# Patient Record
Sex: FEMALE | Race: WHITE | NOT HISPANIC OR LATINO | Employment: FULL TIME | URBAN - METROPOLITAN AREA
[De-identification: names, ages, dates, MRNs, and addresses within clinical notes are randomized per-mention and may not be internally consistent; named-entity substitution may affect disease eponyms.]

---

## 2017-01-17 ENCOUNTER — HOSPITAL ENCOUNTER (OUTPATIENT)
Dept: FAMILY MEDICINE CLINIC | Facility: CLINIC | Age: 42
Setting detail: SPECIMEN
Discharge: HOME | End: 2017-01-17
Attending: FAMILY MEDICINE | Admitting: FAMILY MEDICINE

## 2017-04-13 ENCOUNTER — HOSPITAL ENCOUNTER (OUTPATIENT)
Dept: FAMILY MEDICINE CLINIC | Facility: CLINIC | Age: 42
Setting detail: SPECIMEN
Discharge: HOME | End: 2017-04-13
Attending: FAMILY MEDICINE | Admitting: FAMILY MEDICINE

## 2017-04-13 LAB
ALBUMIN SERPL-MCNC: 3.7 G/DL (ref 3.5–4.8)
ALBUMIN/GLOB SERPL: 1.2 {RATIO} (ref 1–1.7)
ALP SERPL-CCNC: 81 IU/L (ref 32–91)
ALT SERPL-CCNC: 34 IU/L (ref 14–54)
ANION GAP SERPL CALC-SCNC: 13 MMOL/L (ref 10–20)
AST SERPL-CCNC: 25 IU/L (ref 15–41)
BILIRUB SERPL-MCNC: 0.4 MG/DL (ref 0.3–1.2)
BUN SERPL-MCNC: 7 MG/DL (ref 8–20)
BUN/CREAT SERPL: 8.8 (ref 5.4–26.2)
CALCIUM SERPL-MCNC: 9.6 MG/DL (ref 8.9–10.3)
CHLORIDE SERPL-SCNC: 105 MMOL/L (ref 101–111)
CHOLEST SERPL-MCNC: 120 MG/DL
CHOLEST/HDLC SERPL: 4 {RATIO}
CONV CO2: 27 MMOL/L (ref 22–32)
CONV LDL CHOLESTEROL DIRECT: 63 MG/DL (ref 0–100)
CONV TOTAL PROTEIN: 6.8 G/DL (ref 6.1–7.9)
CREAT UR-MCNC: 0.8 MG/DL (ref 0.4–1)
GLOBULIN UR ELPH-MCNC: 3.1 G/DL (ref 2.5–3.8)
GLUCOSE SERPL-MCNC: 176 MG/DL (ref 65–99)
HDLC SERPL-MCNC: 30 MG/DL
LDLC/HDLC SERPL: 2.1 {RATIO}
LIPID INTERPRETATION: ABNORMAL
POTASSIUM SERPL-SCNC: 4 MMOL/L (ref 3.6–5.1)
SODIUM SERPL-SCNC: 141 MMOL/L (ref 136–144)
TRIGL SERPL-MCNC: 220 MG/DL
VLDLC SERPL CALC-MCNC: 27 MG/DL

## 2017-10-12 ENCOUNTER — HOSPITAL ENCOUNTER (OUTPATIENT)
Dept: FAMILY MEDICINE CLINIC | Facility: CLINIC | Age: 42
Setting detail: SPECIMEN
Discharge: HOME | End: 2017-10-12
Attending: FAMILY MEDICINE | Admitting: FAMILY MEDICINE

## 2017-10-12 LAB
ALBUMIN SERPL-MCNC: 4.1 G/DL (ref 3.5–4.8)
ALBUMIN/GLOB SERPL: 1.3 {RATIO} (ref 1–1.7)
ALP SERPL-CCNC: 94 IU/L (ref 32–91)
ALT SERPL-CCNC: 42 IU/L (ref 14–54)
ANION GAP SERPL CALC-SCNC: 10.8 MMOL/L (ref 10–20)
AST SERPL-CCNC: 24 IU/L (ref 15–41)
BASOPHILS # BLD AUTO: 0.1 10*3/UL (ref 0–0.2)
BASOPHILS NFR BLD AUTO: 1 % (ref 0–2)
BILIRUB SERPL-MCNC: 0.3 MG/DL (ref 0.3–1.2)
BUN SERPL-MCNC: 13 MG/DL (ref 8–20)
BUN/CREAT SERPL: 16.3 (ref 5.4–26.2)
CALCIUM SERPL-MCNC: 9.4 MG/DL (ref 8.9–10.3)
CHLORIDE SERPL-SCNC: 101 MMOL/L (ref 101–111)
CHOLEST SERPL-MCNC: 150 MG/DL
CHOLEST/HDLC SERPL: 4.6 {RATIO}
CONV CO2: 27 MMOL/L (ref 22–32)
CONV LDL CHOLESTEROL DIRECT: 92 MG/DL (ref 0–100)
CONV TOTAL PROTEIN: 7.3 G/DL (ref 6.1–7.9)
CREAT UR-MCNC: 0.8 MG/DL (ref 0.4–1)
DIFFERENTIAL METHOD BLD: (no result)
EOSINOPHIL # BLD AUTO: 0.2 10*3/UL (ref 0–0.3)
EOSINOPHIL # BLD AUTO: 1 % (ref 0–3)
ERYTHROCYTE [DISTWIDTH] IN BLOOD BY AUTOMATED COUNT: 15.1 % (ref 11.5–14.5)
GLOBULIN UR ELPH-MCNC: 3.2 G/DL (ref 2.5–3.8)
GLUCOSE SERPL-MCNC: 137 MG/DL (ref 65–99)
HCT VFR BLD AUTO: 42.1 % (ref 35–49)
HDLC SERPL-MCNC: 33 MG/DL
HGB BLD-MCNC: 13.4 G/DL (ref 12–15)
LDLC/HDLC SERPL: 2.8 {RATIO}
LIPID INTERPRETATION: ABNORMAL
LYMPHOCYTES # BLD AUTO: 5.2 10*3/UL (ref 0.8–4.8)
LYMPHOCYTES NFR BLD AUTO: 34 % (ref 18–42)
MCH RBC QN AUTO: 26.6 PG (ref 26–32)
MCHC RBC AUTO-ENTMCNC: 31.9 G/DL (ref 32–36)
MCV RBC AUTO: 83.2 FL (ref 80–94)
MONOCYTES # BLD AUTO: 0.8 10*3/UL (ref 0.1–1.3)
MONOCYTES NFR BLD AUTO: 5 % (ref 2–11)
NEUTROPHILS # BLD AUTO: 9.2 10*3/UL (ref 2.3–8.6)
NEUTROPHILS NFR BLD AUTO: 59 % (ref 50–75)
NRBC BLD AUTO-RTO: 0 /100{WBCS}
NRBC/RBC NFR BLD MANUAL: 0 10*3/UL
PLATELET # BLD AUTO: 393 10*3/UL (ref 150–450)
PMV BLD AUTO: 7.6 FL (ref 7.4–10.4)
POTASSIUM SERPL-SCNC: 3.8 MMOL/L (ref 3.6–5.1)
RBC # BLD AUTO: 5.05 10*6/UL (ref 4–5.4)
SODIUM SERPL-SCNC: 135 MMOL/L (ref 136–144)
TRIGL SERPL-MCNC: 184 MG/DL
VLDLC SERPL CALC-MCNC: 25.7 MG/DL
WBC # BLD AUTO: 15.5 10*3/UL (ref 4.5–11.5)

## 2017-10-21 ENCOUNTER — HOSPITAL ENCOUNTER (OUTPATIENT)
Dept: MAMMOGRAPHY | Facility: HOSPITAL | Age: 42
Discharge: HOME | End: 2017-10-21
Attending: FAMILY MEDICINE | Admitting: FAMILY MEDICINE

## 2017-11-06 ENCOUNTER — HOSPITAL ENCOUNTER (OUTPATIENT)
Dept: MAMMOGRAPHY | Facility: HOSPITAL | Age: 42
Discharge: HOME | End: 2017-11-06
Attending: FAMILY MEDICINE | Admitting: FAMILY MEDICINE

## 2018-04-11 ENCOUNTER — HOSPITAL ENCOUNTER (OUTPATIENT)
Dept: FAMILY MEDICINE CLINIC | Facility: CLINIC | Age: 43
Setting detail: SPECIMEN
Discharge: HOME | End: 2018-04-11
Attending: FAMILY MEDICINE | Admitting: FAMILY MEDICINE

## 2018-04-11 LAB
ALBUMIN SERPL-MCNC: 3.9 G/DL (ref 3.5–4.8)
ALBUMIN/GLOB SERPL: 1.1 {RATIO} (ref 1–1.7)
ALP SERPL-CCNC: 81 IU/L (ref 32–91)
ALT SERPL-CCNC: 38 IU/L (ref 14–54)
ANION GAP SERPL CALC-SCNC: 11.2 MMOL/L (ref 10–20)
AST SERPL-CCNC: 22 IU/L (ref 15–41)
BASOPHILS # BLD AUTO: 0.1 10*3/UL (ref 0–0.2)
BASOPHILS NFR BLD AUTO: 1 % (ref 0–2)
BILIRUB SERPL-MCNC: 0.4 MG/DL (ref 0.3–1.2)
BUN SERPL-MCNC: 15 MG/DL (ref 8–20)
BUN/CREAT SERPL: 16.7 (ref 5.4–26.2)
CALCIUM SERPL-MCNC: 9.9 MG/DL (ref 8.9–10.3)
CHLORIDE SERPL-SCNC: 104 MMOL/L (ref 101–111)
CHOLEST SERPL-MCNC: 127 MG/DL
CHOLEST/HDLC SERPL: 4.3 {RATIO}
CONV CO2: 27 MMOL/L (ref 22–32)
CONV LDL CHOLESTEROL DIRECT: 73 MG/DL (ref 0–100)
CONV TOTAL PROTEIN: 7.3 G/DL (ref 6.1–7.9)
CREAT UR-MCNC: 0.9 MG/DL (ref 0.4–1)
DIFFERENTIAL METHOD BLD: (no result)
EOSINOPHIL # BLD AUTO: 0.2 10*3/UL (ref 0–0.3)
EOSINOPHIL # BLD AUTO: 1 % (ref 0–3)
ERYTHROCYTE [DISTWIDTH] IN BLOOD BY AUTOMATED COUNT: 15.7 % (ref 11.5–14.5)
GLOBULIN UR ELPH-MCNC: 3.4 G/DL (ref 2.5–3.8)
GLUCOSE SERPL-MCNC: 106 MG/DL (ref 65–99)
HCT VFR BLD AUTO: 39 % (ref 35–49)
HDLC SERPL-MCNC: 29 MG/DL
HGB BLD-MCNC: 12.8 G/DL (ref 12–15)
LDLC/HDLC SERPL: 2.5 {RATIO}
LIPID INTERPRETATION: ABNORMAL
LYMPHOCYTES # BLD AUTO: 4.4 10*3/UL (ref 0.8–4.8)
LYMPHOCYTES NFR BLD AUTO: 33 % (ref 18–42)
MCH RBC QN AUTO: 27.5 PG (ref 26–32)
MCHC RBC AUTO-ENTMCNC: 32.9 G/DL (ref 32–36)
MCV RBC AUTO: 83.5 FL (ref 80–94)
MONOCYTES # BLD AUTO: 0.6 10*3/UL (ref 0.1–1.3)
MONOCYTES NFR BLD AUTO: 5 % (ref 2–11)
NEUTROPHILS # BLD AUTO: 8.1 10*3/UL (ref 2.3–8.6)
NEUTROPHILS NFR BLD AUTO: 60 % (ref 50–75)
NRBC BLD AUTO-RTO: 0 /100{WBCS}
NRBC/RBC NFR BLD MANUAL: 0 10*3/UL
PLATELET # BLD AUTO: 365 10*3/UL (ref 150–450)
PMV BLD AUTO: 8 FL (ref 7.4–10.4)
POTASSIUM SERPL-SCNC: 4.2 MMOL/L (ref 3.6–5.1)
RBC # BLD AUTO: 4.66 10*6/UL (ref 4–5.4)
SODIUM SERPL-SCNC: 138 MMOL/L (ref 136–144)
TRIGL SERPL-MCNC: 219 MG/DL
VLDLC SERPL CALC-MCNC: 24.2 MG/DL
WBC # BLD AUTO: 13.4 10*3/UL (ref 4.5–11.5)

## 2018-11-12 ENCOUNTER — HOSPITAL ENCOUNTER (OUTPATIENT)
Dept: FAMILY MEDICINE CLINIC | Facility: CLINIC | Age: 43
Setting detail: SPECIMEN
Discharge: HOME | End: 2018-11-12
Attending: FAMILY MEDICINE | Admitting: FAMILY MEDICINE

## 2018-11-12 LAB
ALBUMIN SERPL-MCNC: 4.2 G/DL (ref 3.5–4.8)
ALBUMIN/GLOB SERPL: 1.3 {RATIO} (ref 1–1.7)
ALP SERPL-CCNC: 93 IU/L (ref 32–91)
ALT SERPL-CCNC: 55 IU/L (ref 14–54)
ANION GAP SERPL CALC-SCNC: 15.9 MMOL/L (ref 10–20)
AST SERPL-CCNC: 24 IU/L (ref 15–41)
BASOPHILS # BLD AUTO: 0.2 10*3/UL (ref 0–0.2)
BASOPHILS NFR BLD AUTO: 1 % (ref 0–2)
BILIRUB SERPL-MCNC: 0.3 MG/DL (ref 0.3–1.2)
BUN SERPL-MCNC: 8 MG/DL (ref 8–20)
BUN/CREAT SERPL: 11.4 (ref 5.4–26.2)
CALCIUM SERPL-MCNC: 9.9 MG/DL (ref 8.9–10.3)
CHLORIDE SERPL-SCNC: 96 MMOL/L (ref 101–111)
CHOLEST SERPL-MCNC: 149 MG/DL
CHOLEST/HDLC SERPL: 4.6 {RATIO}
CONV CO2: 28 MMOL/L (ref 22–32)
CONV LDL CHOLESTEROL DIRECT: 95 MG/DL (ref 0–100)
CONV TOTAL PROTEIN: 7.5 G/DL (ref 6.1–7.9)
CREAT UR-MCNC: 0.7 MG/DL (ref 0.4–1)
DIFFERENTIAL METHOD BLD: (no result)
EOSINOPHIL # BLD AUTO: 0.2 10*3/UL (ref 0–0.3)
EOSINOPHIL # BLD AUTO: 1 % (ref 0–3)
ERYTHROCYTE [DISTWIDTH] IN BLOOD BY AUTOMATED COUNT: 15.5 % (ref 11.5–14.5)
GLOBULIN UR ELPH-MCNC: 3.3 G/DL (ref 2.5–3.8)
GLUCOSE SERPL-MCNC: 123 MG/DL (ref 65–99)
HCT VFR BLD AUTO: 40.7 % (ref 35–49)
HDLC SERPL-MCNC: 32 MG/DL
HGB BLD-MCNC: 13.4 G/DL (ref 12–15)
LDLC/HDLC SERPL: 3 {RATIO}
LIPID INTERPRETATION: ABNORMAL
LYMPHOCYTES # BLD AUTO: 4.2 10*3/UL (ref 0.8–4.8)
LYMPHOCYTES NFR BLD AUTO: 31 % (ref 18–42)
MCH RBC QN AUTO: 27.3 PG (ref 26–32)
MCHC RBC AUTO-ENTMCNC: 32.9 G/DL (ref 32–36)
MCV RBC AUTO: 83.1 FL (ref 80–94)
MONOCYTES # BLD AUTO: 0.7 10*3/UL (ref 0.1–1.3)
MONOCYTES NFR BLD AUTO: 5 % (ref 2–11)
NEUTROPHILS # BLD AUTO: 8.5 10*3/UL (ref 2.3–8.6)
NEUTROPHILS NFR BLD AUTO: 62 % (ref 50–75)
NRBC BLD AUTO-RTO: 0 /100{WBCS}
NRBC/RBC NFR BLD MANUAL: 0 10*3/UL
PLATELET # BLD AUTO: 372 10*3/UL (ref 150–450)
PMV BLD AUTO: 8.3 FL (ref 7.4–10.4)
POTASSIUM SERPL-SCNC: 3.9 MMOL/L (ref 3.6–5.1)
RBC # BLD AUTO: 4.91 10*6/UL (ref 4–5.4)
SODIUM SERPL-SCNC: 136 MMOL/L (ref 136–144)
TRIGL SERPL-MCNC: 312 MG/DL
VLDLC SERPL CALC-MCNC: 21.4 MG/DL
WBC # BLD AUTO: 13.8 10*3/UL (ref 4.5–11.5)

## 2019-05-06 ENCOUNTER — HOSPITAL ENCOUNTER (OUTPATIENT)
Dept: FAMILY MEDICINE CLINIC | Facility: CLINIC | Age: 44
Setting detail: SPECIMEN
Discharge: HOME | End: 2019-05-06
Attending: FAMILY MEDICINE | Admitting: FAMILY MEDICINE

## 2019-05-23 ENCOUNTER — HOSPITAL ENCOUNTER (OUTPATIENT)
Dept: MAMMOGRAPHY | Facility: HOSPITAL | Age: 44
Discharge: HOME | End: 2019-05-23
Attending: FAMILY MEDICINE | Admitting: FAMILY MEDICINE

## 2019-07-02 RX ORDER — GLIPIZIDE 10 MG/1
TABLET, FILM COATED, EXTENDED RELEASE ORAL
Qty: 90 TABLET | Refills: 0 | Status: SHIPPED | OUTPATIENT
Start: 2019-07-02 | End: 2019-09-16 | Stop reason: ALTCHOICE

## 2019-08-31 RX ORDER — TIZANIDINE 2 MG/1
TABLET ORAL
Qty: 30 TABLET | Refills: 3 | Status: SHIPPED | OUTPATIENT
Start: 2019-08-31 | End: 2022-08-17

## 2019-09-16 ENCOUNTER — OFFICE VISIT (OUTPATIENT)
Dept: FAMILY MEDICINE CLINIC | Facility: CLINIC | Age: 44
End: 2019-09-16

## 2019-09-16 VITALS
SYSTOLIC BLOOD PRESSURE: 108 MMHG | OXYGEN SATURATION: 99 % | RESPIRATION RATE: 18 BRPM | BODY MASS INDEX: 37.42 KG/M2 | TEMPERATURE: 98.7 F | HEART RATE: 71 BPM | HEIGHT: 65 IN | DIASTOLIC BLOOD PRESSURE: 66 MMHG | WEIGHT: 224.6 LBS

## 2019-09-16 DIAGNOSIS — E11.9 TYPE 2 DIABETES MELLITUS WITHOUT COMPLICATION, WITHOUT LONG-TERM CURRENT USE OF INSULIN (HCC): Primary | ICD-10-CM

## 2019-09-16 DIAGNOSIS — E78.01 FAMILIAL HYPERCHOLESTEROLEMIA: ICD-10-CM

## 2019-09-16 DIAGNOSIS — E66.3 OVERWEIGHT: ICD-10-CM

## 2019-09-16 PROCEDURE — 99214 OFFICE O/P EST MOD 30 MIN: CPT | Performed by: FAMILY MEDICINE

## 2019-09-16 RX ORDER — ATORVASTATIN CALCIUM 40 MG/1
1 TABLET, FILM COATED ORAL NIGHTLY
COMMUNITY
Start: 2017-10-12 | End: 2019-09-16

## 2019-09-16 RX ORDER — METFORMIN HYDROCHLORIDE 500 MG/1
1000 TABLET, EXTENDED RELEASE ORAL 2 TIMES DAILY WITH MEALS
Qty: 360 TABLET | Refills: 3 | Status: SHIPPED | OUTPATIENT
Start: 2019-09-16 | End: 2020-06-10

## 2019-09-16 RX ORDER — SITAGLIPTIN 100 MG/1
1 TABLET, FILM COATED ORAL DAILY
Refills: 0 | COMMUNITY
Start: 2019-06-12 | End: 2019-09-16

## 2019-09-16 RX ORDER — BLOOD-GLUCOSE METER
1 EACH MISCELLANEOUS DAILY
COMMUNITY
Start: 2016-10-03

## 2019-09-16 RX ORDER — ATORVASTATIN CALCIUM 40 MG/1
40 TABLET, FILM COATED ORAL NIGHTLY
Qty: 90 TABLET | Refills: 3 | Status: SHIPPED | OUTPATIENT
Start: 2019-09-16 | End: 2020-09-04

## 2019-09-16 RX ORDER — METFORMIN HYDROCHLORIDE 500 MG/1
2 TABLET, EXTENDED RELEASE ORAL 2 TIMES DAILY WITH MEALS
COMMUNITY
Start: 2014-05-07 | End: 2019-09-16

## 2019-09-16 RX ORDER — ACARBOSE 100 MG/1
1 TABLET ORAL 3 TIMES DAILY
Refills: 1 | COMMUNITY
Start: 2019-07-23 | End: 2019-09-16 | Stop reason: ALTCHOICE

## 2019-09-16 RX ORDER — SITAGLIPTIN 100 MG/1
TABLET, FILM COATED ORAL
Qty: 90 TABLET | Refills: 0 | OUTPATIENT
Start: 2019-09-16

## 2019-09-16 RX ORDER — GLIPIZIDE 10 MG/1
TABLET, FILM COATED, EXTENDED RELEASE ORAL
Qty: 90 TABLET | Refills: 0 | OUTPATIENT
Start: 2019-09-16

## 2019-09-16 NOTE — PATIENT INSTRUCTIONS
Type 2 Diabetes Mellitus, Self Care, Adult  Caring for yourself after you have been diagnosed with type 2 diabetes (type 2 diabetes mellitus) means keeping your blood sugar (glucose) under control with a balance of:  · Nutrition.  · Exercise.  · Lifestyle changes.  · Medicines or insulin, if necessary.  · Support from your team of health care providers and others.  The following information explains what you need to know to manage your diabetes at home.  What are the risks?  Having diabetes can put you at risk for other long-term (chronic) conditions, such as heart disease and kidney disease. Your health care provider may prescribe medicines to help prevent complications from diabetes. These medicines may include:  · Aspirin.  · Medicine to lower cholesterol.  · Medicine to control blood pressure.  How to monitor blood glucose    · Check your blood glucose every day, as often as told by your health care provider.  · Have your A1c (hemoglobin A1c) level checked two or more times a year, or as often as told by your health care provider.  Your health care provider will set individualized treatment goals for you. Generally, the goal of treatment is to maintain the following blood glucose levels:  · Before meals (preprandial):  mg/dL (4.4-7.2 mmol/L).  · After meals (postprandial): below 180 mg/dL (10 mmol/L).  · A1c level: less than 7%.  How to manage hyperglycemia and hypoglycemia  Hyperglycemia symptoms  Hyperglycemia, also called high blood glucose, occurs when blood glucose is too high. Make sure you know the early signs of hyperglycemia, such as:  · Increased thirst.  · Hunger.  · Feeling very tired.  · Needing to urinate more often than usual.  · Blurry vision.  Hypoglycemia symptoms  Hypoglycemia, also called low blood glucose, occurs with a blood glucose level at or below 70 mg/dL (3.9 mmol/L). The risk for hypoglycemia increases during or after exercise, during sleep, during illness, and when skipping  meals or not eating for a long time (fasting).  It is important to know the symptoms of hypoglycemia and treat it right away. Always have a 15-gram rapid-acting carbohydrate snack with you to treat low blood glucose. Family members and close friends should also know the symptoms and should understand how to treat hypoglycemia, in case you are not able to treat yourself. Symptoms may include:  · Hunger.  · Anxiety.  · Sweating and feeling clammy.  · Confusion.  · Dizziness or feeling light-headed.  · Sleepiness.  · Nausea.  · Increased heart rate.  · Headache.  · Blurry vision.  · Irritability.  · A change in coordination.  · Tingling or numbness around the mouth, lips, or tongue.  · Restless sleep.  · Fainting.  · Seizure.  Treating hypoglycemia  If you are alert and able to swallow safely, follow the 15:15 rule:  · Take 15 grams of a rapid-acting carbohydrate. Rapid-acting options include:  ? 1 tube of glucose gel.  ? 3 glucose pills.  ? 6-8 pieces of hard candy.  ? 4 oz (120 mL) of fruit juice.  ? 4 oz (120 mL) of regular (not diet) soda.  · Check your blood glucose 15 minutes after you take the carbohydrate.  · If the repeat blood glucose level is still at or below 70 mg/dL (3.9 mmol/L), take 15 grams of a carbohydrate again.  · If your blood glucose level does not increase above 70 mg/dL (3.9 mmol/L) after 3 tries, seek emergency medical care.  · After your blood glucose level returns to normal, eat a meal or a snack within 1 hour.  Treating severe hypoglycemia  Severe hypoglycemia is when your blood glucose level is at or below 54 mg/dL (3 mmol/L). Severe hypoglycemia is an emergency. Do not wait to see if the symptoms will go away. Get medical help right away. Call your local emergency services (911 in the [place]  If you have severe hypoglycemia and you cannot eat or drink, you may need an injection of glucagon. A family member or close friend should learn how to check your blood glucose and how to give you a  glucagon injection. Ask your health care provider if you need to have an emergency glucagon injection kit available.  Severe hypoglycemia may need to be treated in a hospital. The treatment may include getting glucose through an IV. You may also need treatment for the cause of your hypoglycemia.  Follow these instructions at home:  Take diabetes medicines as told  · If your health care provider prescribed insulin or diabetes medicines, take them every day.  · Do not run out of insulin or other diabetes medicines that you take. Plan ahead so you always have these available.  · If you use insulin, adjust your dosage based on how physically active you are and what foods you eat. Your health care provider will tell you how to adjust your dosage.  Make healthy food choices    The things that you eat and drink affect your blood glucose and your insulin dosage. Making good choices helps to control your diabetes and prevent other health problems. A healthy meal plan includes eating lean proteins, complex carbohydrates, fresh fruits and vegetables, low-fat dairy products, and healthy fats.  Make an appointment to see a diet and nutrition specialist (registered dietitian) to help you create an eating plan that is right for you. Make sure that you:  · Follow instructions from your health care provider about eating or drinking restrictions.  · Drink enough fluid to keep your urine pale yellow.  · Keep a record of the carbohydrates that you eat. Do this by reading food labels and learning the standard serving sizes of foods.  · Follow your sick day plan whenever you cannot eat or drink as usual. Make this plan in advance with your health care provider.    Stay active  Exercise regularly, as told by your health care provider. This may include:  · Stretching and doing strength exercises, such as yoga or weightlifting, 2 or more times a week.  · Doing 150 minutes or more of moderate-intensity or vigorous-intensity exercise each  week. This could be brisk walking, biking, or water aerobics.  ? Spread out your activity over 3 or more days of the week.  ? Do not go more than 2 days in a row without doing some kind of physical activity.  When you start a new exercise or activity, work with your health care provider to adjust your insulin, medicines, or food intake as needed.  Make healthy lifestyle choices  · Do not use any tobacco products, such as cigarettes, chewing tobacco, and e-cigarettes. If you need help quitting, ask your health care provider.  · If your health care provider says that alcohol is safe for you, limit alcohol intake to no more than 1 drink per day for nonpregnant women and 2 drinks per day for men. One drink equals 12 oz of beer, 5 oz of wine, or 1½ oz of hard liquor.  · Learn to manage stress. If you need help with this, ask your health care provider.  Care for your body    · Keep your immunizations up to date. In addition to getting vaccinations as told by your health care provider, it is recommended that you get vaccinated against the following illnesses:  ? The flu (influenza). Get a flu shot every year.  ? Pneumonia.  ? Hepatitis B.  · Schedule an eye exam soon after your diagnosis, and then one time every year after that.  · Check your skin and feet every day for cuts, bruises, redness, blisters, or sores. Schedule a foot exam with your health care provider once every year.  · Brush your teeth and gums two times a day, and floss one or more times a day. Visit your dentist one or more times every 6 months.  · Maintain a healthy weight.  General instructions  · Take over-the-counter and prescription medicines only as told by your health care provider.  · Share your diabetes management plan with people in your workplace, school, and household.  · Carry a medical alert card or wear medical alert jewelry.  · Keep all follow-up visits as told by your health care provider. This is important.  Questions to ask your health  care provider  · Do I need to meet with a diabetes educator?  · Where can I find a support group for people with diabetes?  Where to find more information  For more information about diabetes, visit:  · American Diabetes Association (ADA): www.diabetes.org  · American Association of Diabetes Educators (AADE): www.diabeteseducator.org  Summary  · Caring for yourself after you have been diagnosed with (type 2 diabetes mellitus) means keeping your blood sugar (glucose) under control with a balance of nutrition, exercise, lifestyle changes, and medicine.  · Check your blood glucose every day, as often as told by your health care provider.  · Having diabetes can put you at risk for other long-term (chronic) conditions, such as heart disease and kidney disease. Your health care provider may prescribe medicines to help prevent complications from diabetes.  · Keep all follow-up visits as told by your health care provider. This is important.  This information is not intended to replace advice given to you by your health care provider. Make sure you discuss any questions you have with your health care provider.  Document Released: 04/10/2017 Document Revised: 07/20/2018 Document Reviewed: 01/20/2017  [employer] Interactive Patient Education © 2019 [employer]    Mindfulness-Based Stress Reduction  Mindfulness-based stress reduction (MBSR) is a program that helps people learn to practice mindfulness. Mindfulness is the practice of intentionally paying attention to the present moment. It can be learned and practiced through techniques such as education, breathing exercises, meditation, and yoga. MBSR includes several mindfulness techniques in one program.  MBSR works best when you understand the treatment, are willing to try new things, and can commit to spending time practicing what you learn. MBSR training may include learning about:  · How your emotions, thoughts, and reactions affect your body.  · New ways to respond to  things that cause negative thoughts to start (triggers).  · How to notice your thoughts and let go of them.  · Practicing awareness of everyday things that you normally do without thinking.  · The techniques and goals of different types of meditation.  What are the benefits of MBSR?  MBSR can have many benefits, which include helping you to:  · Develop self-awareness. This refers to knowing and understanding yourself.  · Learn skills and attitudes that help you to participate in your own health care.  · Learn new ways to care for yourself.  · Be more accepting about how things are, and let things go.  · Be less judgmental and approach things with an open mind.  · Be patient with yourself and trust yourself more.  MBSR has also been shown to:  · Reduce negative emotions, such as depression and anxiety.  · Improve memory and focus.  · Change how you sense and approach pain.  · Boost your body's ability to fight infections.  · Help you connect better with other people.  · Improve your sense of well-being.  Follow these instructions at home:    · Find a local in-person or online MBSR program.  · Set aside some time regularly for mindfulness practice.  · Find a mindfulness practice that works best for you. This may include one or more of the following:  ? Meditation. Meditation involves focusing your mind on a certain thought or activity.  ? Breathing awareness exercises. These help you to stay present by focusing on your breath.  ? Body scan. For this practice, you lie down and pay attention to each part of your body from head to toe. You can identify tension and soreness and intentionally relax parts of your body.  ? Yoga. Yoga involves stretching and breathing, and it can improve your ability to move and be flexible. It can also provide an experience of testing your body's limits, which can help you release stress.  ? Mindful eating. This way of eating involves focusing on the taste, texture, color, and smell of each  bite of food. Because this slows down eating and helps you feel full sooner, it can be an important part of a weight-loss plan.  · Find a podcast or recording that provides guidance for breathing awareness, body scan, or meditation exercises. You can listen to these any time when you have a free moment to rest without distractions.  · Follow your treatment plan as told by your health care provider. This may include taking regular medicines and making changes to your diet or lifestyle as recommended.  How to practice mindfulness  To do a basic awareness exercise:  · Find a comfortable place to sit.  · Pay attention to the present moment. Observe your thoughts, feelings, and surroundings just as they are.  · Avoid placing judgment on yourself, your feelings, or your surroundings. Make note of any judgment that comes up, and let it go.  · Your mind may wander, and that is okay. Make note of when your thoughts drift, and return your attention to the present moment.  To do basic mindfulness meditation:  · Find a comfortable place to sit. This may include a stable chair or a firm floor cushion.  ? Sit upright with your back straight. Let your arms fall next to your side with your hands resting on your legs.  ? If sitting in a chair, rest your feet flat on the floor.  ? If sitting on a cushion, cross your legs in front of you.  · Keep your head in a neutral position with your chin dropped slightly. Relax your jaw and rest the tip of your tongue on the roof of your mouth. Drop your gaze to the floor. You can close your eyes if you like.  · Breathe normally and pay attention to your breath. Feel the air moving in and out of your nose. Feel your belly expanding and relaxing with each breath.  · Your mind may wander, and that is okay. Make note of when your thoughts drift, and return your attention to your breath.  · Avoid placing judgment on yourself, your feelings, or your surroundings. Make note of any judgment or feelings  that come up, let them go, and bring your attention back to your breath.  · When you are ready, lift your gaze or open your eyes. Pay attention to how your body feels after the meditation.  Where to find more information  You can find more information about MBSR from:  · Your health care provider.  · Community-based meditation centers or programs.  · Programs offered near you.  Summary  · Mindfulness-based stress reduction (MBSR) is a program that teaches you how to intentionally pay attention to the present moment. It is used with other treatments to help you cope better with daily stress, emotions, and pain.  · MBSR focuses on developing self-awareness, which allows you to respond to life stress without judgment or negative emotions.  · MBSR programs may involve learning different mindfulness practices, such as breathing exercises, meditation, yoga, body scan, or mindful eating. Find a mindfulness practice that works best for you, and set aside time for it on a regular basis.  This information is not intended to replace advice given to you by your health care provider. Make sure you discuss any questions you have with your health care provider.  Document Released: 04/26/2018 Document Revised: 04/26/2018 Document Reviewed: 04/26/2018  [employer] Interactive Patient Education © 2019 [employer]

## 2019-09-16 NOTE — PROGRESS NOTES
Chief Complaint   Patient presents with   • Diabetes   • Hyperlipidemia   • Spasms       Subjective   History of Present Illness   Maria E Barroso is a 44 y.o. female.   She presents today to follow-up on her diabetes.  She claims home glucose readings fasting morning are around 130 she does report frequent low sugars in the mornings and has the numbers as well as 54 but become symptomatic with sugars dropping 79 she is experience in this most days.  She is typically not able to eat lunch until around noon.  She wakes up at 40 breakfast when she is to work on 5 or 6.  She was diagnosed with diabetes about 6 years ago initial A1c was 10 she states Precose was not very effective so Januvia was added most recently she is currently on for oral hypoglycemic most recent A1c in November was 6.6.  Her last eye exam was December 2018 at [facility] in [place]     Hyperlipidemia,:denies side effects of Lipitor    Obesity: Patient is interested in losing weight his had a hard time doing so, especially since her medications make her hungry and she is frequently eating to deal with low sugars        The following portions of the patient's history were reviewed and updated as appropriate: allergies, current medications,  family history, past medical history, social history,  surgical history and problem list.    Current Outpatient Medications on File Prior to Visit   Medication Sig   • Blood Glucose Monitoring Suppl (ACCU-CHEK [place] PLUS) w/Device kit 1 kit by Other route Daily.   • tiZANidine (ZANAFLEX) 2 MG tablet TAKE 1 TO 2 TABLETS BY MOUTH AT NIGHT AS NEEDED   • [DISCONTINUED] acarbose (PRECOSE) 100 MG tablet Take 1 tablet by mouth 3 (Three) Times a Day. Take with meals   • [DISCONTINUED] atorvastatin (LIPITOR) 40 MG tablet Take 1 tablet by mouth Every Night.   • [DISCONTINUED] glipiZIDE (GLUCOTROL XL) 10 MG 24 hr tablet TAKE 1 TABLET BY MOUTH EVERY DAY   • [DISCONTINUED] glucose blood (ACCU-CHEK [place] PLUS) test strip  "1 strip by In Vitro route Daily.   • [DISCONTINUED] JANUVIA 100 MG tablet Take 1 tablet by mouth Daily.   • [DISCONTINUED] Lancets (ACCU-CHEK SOFT TOUCH) lancets 1 each Daily.   • [DISCONTINUED] metFORMIN ER (GLUCOPHAGE-XR) 500 MG 24 hr tablet Take 2 tablets by mouth 2 (Two) Times a Day With Meals.     No current facility-administered medications on file prior to visit.          Review of Systems   Constitutional: Positive for fatigue. Negative for appetite change, fever and unexpected weight loss.   HENT: Negative for congestion.    Eyes: Negative for visual disturbance.   Respiratory: Negative for cough, shortness of breath and wheezing.    Cardiovascular: Negative for chest pain, palpitations and leg swelling.   Gastrointestinal: Negative for abdominal pain, constipation, diarrhea, nausea, vomiting and indigestion.   Skin: Negative for rash.   Neurological: Positive for light-headedness ( With low glucose) and headache ( With high glucose readings). Negative for numbness.   Psychiatric/Behavioral: Negative for sleep disturbance and depressed mood.       Objective   /66 (BP Location: Right arm, Patient Position: Sitting, Cuff Size: Large Adult)   Pulse 71   Temp 98.7 °F (37.1 °C) (Oral)   Resp 18   Ht 165.1 cm (65\")   Wt 102 kg (224 lb 9.6 oz)   SpO2 99%   BMI 37.38 kg/m²     Physical Exam   Constitutional: She is oriented to person, place, and time. She appears well-developed and well-nourished.   HENT:   Head: Normocephalic and atraumatic.   Eyes: Conjunctivae and EOM are normal. Pupils are equal, round, and reactive to light.   Neck: No JVD present. No thyromegaly present.   Cardiovascular: Normal rate, regular rhythm and normal heart sounds.   No murmur heard.  Pulmonary/Chest: Breath sounds normal. She has no wheezes. She has no rales.   Musculoskeletal: She exhibits no edema.   Lymphadenopathy:     She has no cervical adenopathy.   Neurological: She is alert and oriented to person, place, and " time.   Skin: Skin is warm and dry. No rash noted.   Psychiatric: She has a normal mood and affect.   Nursing note and vitals reviewed.      No visits with results within 4 Month(s) from this visit.   Latest known visit with results is:   Hospital Outpatient Visit on 11/12/2018   Component Date Value Ref Range Status   • WBC 11/12/2018 13.8* 4.5 - 11.5 10*3/uL Final   • RBC 11/12/2018 4.91  4.00 - 5.40 10*6/uL Final   • Hemoglobin 11/12/2018 13.4  12.0 - 15.0 g/dL Final   • Hematocrit 11/12/2018 40.7  35 - 49 % Final   • MCV 11/12/2018 83.1  80 - 94 fL Final   • MCH 11/12/2018 27.3  26 - 32 pg Final   • MCHC 11/12/2018 32.9  32 - 36 g/dL Final   • RDW 11/12/2018 15.5* 11.5 - 14.5 % Final   • Platelets 11/12/2018 372  150 - 450 10*3/uL Final   • MPV 11/12/2018 8.3  7.4 - 10.4 fL Final   • Differential Type 11/12/2018 AUTO   Final   • Neutrophils Absolute 11/12/2018 8.5  2.3 - 8.6 10*3/uL Final   • Lymphocytes Absolute 11/12/2018 4.2  0.8 - 4.8 10*3/uL Final   • Monocytes Absolute 11/12/2018 0.7  0.1 - 1.3 10*3/uL Final   • Eosinophils Absolute 11/12/2018 0.2  0.0 - 0.3 10*3/uL Final   • Basophils Absolute 11/12/2018 0.2  0 - 0.2 10*3/uL Final   • Neutrophil Rel % 11/12/2018 62  50 - 75 % Final   • Lymphocyte Rel % 11/12/2018 31  18 - 42 % Final   • Monocyte Rel % 11/12/2018 5  2 - 11 % Final   • Eosinophil Rel % 11/12/2018 1  0 - 3 % Final   • Basophil Rel % 11/12/2018 1  0 - 2 % Final   • nRBC 11/12/2018 0  0 /100[WBCs] Final   • Absolute nRBC 11/12/2018 0  10*3/uL Final   • Total Cholesterol 11/12/2018 149  <200 mg/dL Final   • Triglycerides 11/12/2018 312* <150 mg/dL Final   • HDL Cholesterol 11/12/2018 32* >39 mg/dL Final   • LDL Cholesterol  11/12/2018 95  0 - 100 mg/dL Final   • VLDL Cholesterol 11/12/2018 21.4* <21 mg/dL Final   • LDL/HDL Ratio 11/12/2018 3.0   Final   • Chol/HDL Ratio 11/12/2018 4.6   Final    (SEE BELOW)  The following guidelines have been recommended by the NCEP for -    • Lipid  Interpretation 11/12/2018 Total Cholesterol, Total Triglycerides, LDL Cholesterol,and HDL Cholesterol:    Final   • Lipid Interpretation 11/12/2018 TOTAL CHOLESTEROL                    HDL CHOLESTEROL  Desirable:              Final   • Lipid Interpretation 11/12/2018 <200 mg/dL     Low HDL:            <40 mg/dL  Borderline High:   200-239 mg/dL    Final   • Lipid Interpretation 11/12/2018    Normal:           40-60 mg/dL  High:           > or = 240 mg/dL       Final   • Lipid Interpretation 11/12/2018 Desirable:          >60 mg/dL  TOTAL TRIGLYCERIDE                   LDL   Final   • Lipid Interpretation 11/12/2018 CHOLESTEROL  Normal:               <150 mg/dL     Optimal:           <100 mg/dL   Final   • Lipid Interpretation 11/12/2018  Borderline High:   150-199 mg/dL     Low Risk:       100-129 mg/dL  High:        Final   • Lipid Interpretation 11/12/2018         200-499 mg/dL     Borderline High:130-159 mg/dL  Very High:      > or =   Final   • Lipid Interpretation 11/12/2018 500 mg/dL     High:           160-189 mg/dL                                       Final   • Lipid Interpretation 11/12/2018   Very High:   > or = 190 mg/dL  The following ratios of LDL to HDL and Total   Final   • Lipid Interpretation 11/12/2018 Cholesterol to HDL are for information only:  LDL/HDL RATIO                       Final   • Lipid Interpretation 11/12/2018    TOTAL CHOLESTEROL/HDL RATIO  Desirable:              <5           Low Risk:    Final   • Lipid Interpretation 11/12/2018      3.3-4.4   Optimal:        < or = 3.5           Average Risk:   4.4-7.1       Final   • Lipid Interpretation 11/12/2018                                    Medium Risk:    7.1-11                         Final   • Lipid Interpretation 11/12/2018                   High Risk:         >11      Final   • Sodium 11/12/2018 136  136 - 144 mmol/L Final   • Potassium 11/12/2018 3.9  3.6 - 5.1 mmol/L Final   • Chloride 11/12/2018 96* 101 - 111 mmol/L Final   •  CO2 11/12/2018 28  22 - 32 mmol/L Final   • Glucose 11/12/2018 123* 65 - 99 mg/dL Final   • BUN 11/12/2018 8  8 - 20 mg/dL Final   • Creatinine 11/12/2018 0.7  0.4 - 1.0 mg/dl Final   • Calcium 11/12/2018 9.9  8.9 - 10.3 mg/dL Final   • Total Protein 11/12/2018 7.5  6.1 - 7.9 g/dL Final   • Albumin 11/12/2018 4.2  3.5 - 4.8 g/dL Final   • Total Bilirubin 11/12/2018 0.3  0.3 - 1.2 mg/dL Final   • Alkaline Phosphatase 11/12/2018 93* 32 - 91 IU/L Final   • AST (SGOT) 11/12/2018 24  15 - 41 IU/L Final   • ALT (SGPT) 11/12/2018 55* 14 - 54 IU/L Final   • Anion Gap 11/12/2018 15.9  10 - 20 Final   • BUN/Creatinine Ratio 11/12/2018 11.4  5.4 - 26.2 Final   • GFR MDRD Non  11/12/2018 >60  >60 mL/min/1.73m2 Final   • GFR MDRD  11/12/2018 >60  >60 mL/min/1.73m2 Final   • Globulin 11/12/2018 3.3  2.5 - 3.8 G/dL Final   • A/G Ratio 11/12/2018 1.3  1.0 - 1.7 Final         Assessment/Plan   Diagnoses and all orders for this visit:    1. Type 2 diabetes mellitus without complication, without long-term current use of insulin (CMS/[facility]) (Primary)  -     Dulaglutide (TRULICITY) 0.75 MG/0.5ML solution pen-injector; Inject 0.75 mg under the skin into the appropriate area as directed 1 (One) Time Per Week.  Dispense: 4 pen; Refill: 0  -     atorvastatin (LIPITOR) 40 MG tablet; Take 1 tablet by mouth Every Night.  Dispense: 90 tablet; Refill: 3  -     metFORMIN ER (GLUCOPHAGE-XR) 500 MG 24 hr tablet; Take 2 tablets by mouth 2 (Two) Times a Day With Meals.  Dispense: 360 tablet; Refill: 3  -     Lancets (ACCU-CHEK SOFT TOUCH) lancets; 1 each by Other route Daily.  Dispense: 300 each; Refill: 3  -     glucose blood (ACCU-CHEK [place] PLUS) test strip; 1 each by Other route Daily.  Dispense: 300 each; Refill: 5  -     Comprehensive metabolic panel; Future  -     Lipid Panel With LDL/HDL Ratio; Future  -     Hemoglobin A1c; Future    2. Familial hypercholesterolemia  -     atorvastatin (LIPITOR) 40 MG tablet;  Take 1 tablet by mouth Every Night.  Dispense: 90 tablet; Refill: 3  -     Lipid Panel With LDL/HDL Ratio; Future    3. Overweight      Concentrated today on trying to simplify and update patient's diabetic regimen especially since she is having frequent hypoglycemia.  We will continue metformin.  Januvia, glipizide, and Precose starting Trulicity.  We will have her return in 1 month checking glucose readings fasting in the mornings and anytime she has sensation with her elevated or low.  Likely increase Trulicity to 1.5 mg at that time.  Additionally will likely start ACE inhibitor for renal protection.  Do encourage continued low concentrated sweet diet and attempt at weight loss         Return in about 4 weeks (around 10/14/2019).      AVS given with handouts appropriate to diagnoses addressed

## 2019-09-17 ENCOUNTER — RESULTS ENCOUNTER (OUTPATIENT)
Dept: FAMILY MEDICINE CLINIC | Facility: CLINIC | Age: 44
End: 2019-09-17

## 2019-09-17 DIAGNOSIS — E11.9 TYPE 2 DIABETES MELLITUS WITHOUT COMPLICATION, WITHOUT LONG-TERM CURRENT USE OF INSULIN (HCC): ICD-10-CM

## 2019-09-17 DIAGNOSIS — E78.01 FAMILIAL HYPERCHOLESTEROLEMIA: ICD-10-CM

## 2019-09-18 LAB
ALBUMIN SERPL-MCNC: 4.6 G/DL (ref 3.5–5.5)
ALBUMIN/GLOB SERPL: 1.6 {RATIO} (ref 1.2–2.2)
ALP SERPL-CCNC: 99 IU/L (ref 39–117)
ALT SERPL-CCNC: 37 IU/L (ref 0–32)
AST SERPL-CCNC: 25 IU/L (ref 0–40)
BILIRUB SERPL-MCNC: 0.3 MG/DL (ref 0–1.2)
BUN SERPL-MCNC: 11 MG/DL (ref 6–24)
BUN/CREAT SERPL: 15 (ref 9–23)
CALCIUM SERPL-MCNC: 9.8 MG/DL (ref 8.7–10.2)
CHLORIDE SERPL-SCNC: 99 MMOL/L (ref 96–106)
CHOLEST SERPL-MCNC: 142 MG/DL (ref 100–199)
CO2 SERPL-SCNC: 21 MMOL/L (ref 20–29)
CREAT SERPL-MCNC: 0.73 MG/DL (ref 0.57–1)
GLOBULIN SER CALC-MCNC: 2.8 G/DL (ref 1.5–4.5)
GLUCOSE SERPL-MCNC: 75 MG/DL (ref 65–99)
HBA1C MFR BLD: 7.7 % (ref 4.8–5.6)
HDLC SERPL-MCNC: 32 MG/DL
LDLC SERPL CALC-MCNC: 78 MG/DL (ref 0–99)
LDLC/HDLC SERPL: 2.4 RATIO (ref 0–3.2)
POTASSIUM SERPL-SCNC: 4.4 MMOL/L (ref 3.5–5.2)
PROT SERPL-MCNC: 7.4 G/DL (ref 6–8.5)
SODIUM SERPL-SCNC: 140 MMOL/L (ref 134–144)
TRIGL SERPL-MCNC: 162 MG/DL (ref 0–149)
VLDLC SERPL CALC-MCNC: 32 MG/DL (ref 5–40)

## 2019-09-19 ENCOUNTER — TELEPHONE (OUTPATIENT)
Dept: FAMILY MEDICINE CLINIC | Facility: CLINIC | Age: 44
End: 2019-09-19

## 2019-09-19 NOTE — TELEPHONE ENCOUNTER
PATIENT CALLED IN THIS AM AND STATED HER SUGARS ARE STILL RUNNING HIGH - SHE TOOK THE TRULICITY  INJ YESTERDAY WENT TO WORK AND HAD TO GO HOME DUE TO IT MADE HER SICK - VERY NAUSEATED - STATING THIS IS NOT NORMAL - PLEASE CONTACT PATIENT

## 2019-09-19 NOTE — TELEPHONE ENCOUNTER
Called patient on cell phone. No answer. Per HIPAA may leave VM on cell phone. VM left on cell phone advising her of the above and to call office with questions/concerns.

## 2019-09-19 NOTE — TELEPHONE ENCOUNTER
Please call patient and let her know nausea is a common side effect with Trulicity but tends to get better after a couple weeks.  Ask how high her sugars are?  If 180 or 200 higher she could take the glipizide 10 mg daily until Trulicity starts working better and glucose is below 160

## 2019-10-14 ENCOUNTER — OFFICE VISIT (OUTPATIENT)
Dept: FAMILY MEDICINE CLINIC | Facility: CLINIC | Age: 44
End: 2019-10-14

## 2019-10-14 VITALS
HEIGHT: 65 IN | RESPIRATION RATE: 16 BRPM | SYSTOLIC BLOOD PRESSURE: 101 MMHG | BODY MASS INDEX: 36.49 KG/M2 | HEART RATE: 78 BPM | DIASTOLIC BLOOD PRESSURE: 71 MMHG | TEMPERATURE: 97.7 F | OXYGEN SATURATION: 99 % | WEIGHT: 219 LBS

## 2019-10-14 DIAGNOSIS — E11.9 TYPE 2 DIABETES MELLITUS WITHOUT COMPLICATION, WITHOUT LONG-TERM CURRENT USE OF INSULIN (HCC): Primary | ICD-10-CM

## 2019-10-14 DIAGNOSIS — Z23 NEED FOR PROPHYLACTIC VACCINATION AND INOCULATION AGAINST INFLUENZA: ICD-10-CM

## 2019-10-14 DIAGNOSIS — E66.9 OBESITY (BMI 30-39.9): ICD-10-CM

## 2019-10-14 PROCEDURE — 99214 OFFICE O/P EST MOD 30 MIN: CPT | Performed by: FAMILY MEDICINE

## 2019-10-14 PROCEDURE — 90471 IMMUNIZATION ADMIN: CPT | Performed by: FAMILY MEDICINE

## 2019-10-14 PROCEDURE — 90674 CCIIV4 VAC NO PRSV 0.5 ML IM: CPT | Performed by: FAMILY MEDICINE

## 2019-10-14 NOTE — PATIENT INSTRUCTIONS
Diabetes Mellitus and Exercise  Exercising regularly is important for your overall health, especially when you have diabetes (diabetes mellitus). Exercising is not only about losing weight. It has many other health benefits, such as increasing muscle strength and bone density and reducing body fat and stress. This leads to improved fitness, flexibility, and endurance, all of which result in better overall health.  Exercise has additional benefits for people with diabetes, including:  · Reducing appetite.  · Helping to lower and control blood glucose.  · Lowering blood pressure.  · Helping to control amounts of fatty substances (lipids) in the blood, such as cholesterol and triglycerides.  · Helping the body to respond better to insulin (improving insulin sensitivity).  · Reducing how much insulin the body needs.  · Decreasing the risk for heart disease by:  ? Lowering cholesterol and triglyceride levels.  ? Increasing the levels of good cholesterol.  ? Lowering blood glucose levels.  What is my activity plan?  Your health care provider or certified diabetes educator can help you make a plan for the type and frequency of exercise (activity plan) that works for you. Make sure that you:  · Do at least 150 minutes of moderate-intensity or vigorous-intensity exercise each week. This could be brisk walking, biking, or water aerobics.  ? Do stretching and strength exercises, such as yoga or weightlifting, at least 2 times a week.  ? Spread out your activity over at least 3 days of the week.  · Get some form of physical activity every day.  ? Do not go more than 2 days in a row without some kind of physical activity.  ? Avoid being inactive for more than 30 minutes at a time. Take frequent breaks to walk or stretch.  · Choose a type of exercise or activity that you enjoy, and set realistic goals.  · Start slowly, and gradually increase the intensity of your exercise over time.  What do I need to know about managing my  diabetes?    · Check your blood glucose before and after exercising.  ? If your blood glucose is 240 mg/dL (13.3 mmol/L) or higher before you exercise, check your urine for ketones. If you have ketones in your urine, do not exercise until your blood glucose returns to normal.  ? If your blood glucose is 100 mg/dL (5.6 mmol/L) or lower, eat a snack containing 15-20 grams of carbohydrate. Check your blood glucose 15 minutes after the snack to make sure that your level is above 100 mg/dL (5.6 mmol/L) before you start your exercise.  · Know the symptoms of low blood glucose (hypoglycemia) and how to treat it. Your risk for hypoglycemia increases during and after exercise. Common symptoms of hypoglycemia can include:  ? Hunger.  ? Anxiety.  ? Sweating and feeling clammy.  ? Confusion.  ? Dizziness or feeling light-headed.  ? Increased heart rate or palpitations.  ? Blurry vision.  ? Tingling or numbness around the mouth, lips, or tongue.  ? Tremors or shakes.  ? Irritability.  · Keep a rapid-acting carbohydrate snack available before, during, and after exercise to help prevent or treat hypoglycemia.  · Avoid injecting insulin into areas of the body that are going to be exercised. For example, avoid injecting insulin into:  ? The arms, when playing tennis.  ? The legs, when jogging.  · Keep records of your exercise habits. Doing this can help you and your health care provider adjust your diabetes management plan as needed. Write down:  ? Food that you eat before and after you exercise.  ? Blood glucose levels before and after you exercise.  ? The type and amount of exercise you have done.  ? When your insulin is expected to peak, if you use insulin. Avoid exercising at times when your insulin is peaking.  · When you start a new exercise or activity, work with your health care provider to make sure the activity is safe for you, and to adjust your insulin, medicines, or food intake as needed.  · Drink plenty of water while  you exercise to prevent dehydration or heat stroke. Drink enough fluid to keep your urine clear or pale yellow.  Summary  · Exercising regularly is important for your overall health, especially when you have diabetes (diabetes mellitus).  · Exercising has many health benefits, such as increasing muscle strength and bone density and reducing body fat and stress.  · Your health care provider or certified diabetes educator can help you make a plan for the type and frequency of exercise (activity plan) that works for you.  · When you start a new exercise or activity, work with your health care provider to make sure the activity is safe for you, and to adjust your insulin, medicines, or food intake as needed.  This information is not intended to replace advice given to you by your health care provider. Make sure you discuss any questions you have with your health care provider.  Document Released: 03/09/2005 Document Revised: 06/28/2018 Document Reviewed: 05/29/2017  [employer] Patient Education © 2019 [employer]

## 2019-10-14 NOTE — PROGRESS NOTES
Chief Complaint   Patient presents with   • Diabetes     Lowest blood sugar reading was 71; highest = 141   • Obesity     Lost 5# in 4 weeks now on Trulicity        Subjective   History of Present Illness   Maria E Barroso is a 44 y.o. female.   She returns today for a one-month follow-up since starting Trulicity.  She states she is feeling significantly better, no longer feeling weak and tired, having increased energy no longer having low sugars and not having to eat to deal with low sugars.  Previously was having sugars in the 50s currently  she initially stopped glipizide but had to restart at 10 mg in the morning due to some ongoing sugars but ran out 2 days ago.  She has lost 4 pounds without trying.  Initially she did have a little nausea and significant diarrhea but it has resolved.      The following portions of the patient's history were reviewed and updated as appropriate: allergies, current medications,  family history, past medical history, social history,  surgical history and problem list.    Current Outpatient Medications on File Prior to Visit   Medication Sig   • atorvastatin (LIPITOR) 40 MG tablet Take 1 tablet by mouth Every Night.   • Blood Glucose Monitoring Suppl (ACCU-CHEK [place] PLUS) w/Device kit 1 kit by Other route Daily.   • glucose blood (ACCU-CHEK [place] PLUS) test strip 1 each by Other route Daily.   • Lancets (ACCU-CHEK SOFT TOUCH) lancets 1 each by Other route Daily.   • metFORMIN ER (GLUCOPHAGE-XR) 500 MG 24 hr tablet Take 2 tablets by mouth 2 (Two) Times a Day With Meals.   • tiZANidine (ZANAFLEX) 2 MG tablet TAKE 1 TO 2 TABLETS BY MOUTH AT NIGHT AS NEEDED   • [DISCONTINUED] Dulaglutide (TRULICITY) 0.75 MG/0.5ML solution pen-injector Inject 0.75 mg under the skin into the appropriate area as directed 1 (One) Time Per Week.     No current facility-administered medications on file prior to visit.          Review of Systems   Constitutional: Positive for unexpected weight loss.  "Negative for appetite change and fever.   HENT: Negative for congestion.    Eyes: Negative for visual disturbance.   Respiratory: Negative for cough, shortness of breath and wheezing.    Cardiovascular: Negative for chest pain, palpitations and leg swelling.   Gastrointestinal: Negative for abdominal pain, constipation, diarrhea, nausea, vomiting and indigestion.   Skin: Negative for rash.   Neurological: Negative for light-headedness, numbness and headache.   Psychiatric/Behavioral: Negative for sleep disturbance and depressed mood.       Objective   /71 (BP Location: Left arm, Patient Position: Sitting)   Pulse 78   Temp 97.7 °F (36.5 °C) (Oral)   Resp 16   Ht 165.1 cm (65\")   Wt 99.3 kg (219 lb)   SpO2 99%   BMI 36.44 kg/m²     Physical Exam   Constitutional: She is oriented to person, place, and time. She appears well-developed and well-nourished.   HENT:   Head: Normocephalic and atraumatic.   Eyes: Conjunctivae and EOM are normal. Pupils are equal, round, and reactive to light.   Neck: Normal range of motion. No thyromegaly present.   Cardiovascular: Normal rate, regular rhythm and normal heart sounds.   No murmur heard.  Pulmonary/Chest: Breath sounds normal. She has no wheezes. She has no rales.   Musculoskeletal: Normal range of motion. She exhibits no edema.   Lymphadenopathy:     She has no cervical adenopathy.   Neurological: She is alert and oriented to person, place, and time.   Skin: Skin is warm and dry. No rash noted.   Psychiatric: She has a normal mood and affect.   Nursing note and vitals reviewed.      Results Encounter on 09/17/2019   Component Date Value Ref Range Status   • Glucose 09/17/2019 75  65 - 99 mg/dL Final   • BUN 09/17/2019 11  6 - 24 mg/dL Final   • Creatinine 09/17/2019 0.73  0.57 - 1.00 mg/dL Final   • eGFR Non African Am 09/17/2019 99  >59 mL/min/1.73 Final   • eGFR African Am 09/17/2019 114  >59 mL/min/1.73 Final   • BUN/Creatinine Ratio 09/17/2019 15  9 - 23 " Final   • Sodium 09/17/2019 140  134 - 144 mmol/L Final   • Potassium 09/17/2019 4.4  3.5 - 5.2 mmol/L Final   • Chloride 09/17/2019 99  96 - 106 mmol/L Final   • Total CO2 09/17/2019 21  20 - 29 mmol/L Final   • Calcium 09/17/2019 9.8  8.7 - 10.2 mg/dL Final   • Total Protein 09/17/2019 7.4  6.0 - 8.5 g/dL Final   • Albumin 09/17/2019 4.6  3.5 - 5.5 g/dL Final   • Globulin 09/17/2019 2.8  1.5 - 4.5 g/dL Final   • A/G Ratio 09/17/2019 1.6  1.2 - 2.2 Final   • Total Bilirubin 09/17/2019 0.3  0.0 - 1.2 mg/dL Final   • Alkaline Phosphatase 09/17/2019 99  39 - 117 IU/L Final   • AST (SGOT) 09/17/2019 25  0 - 40 IU/L Final   • ALT (SGPT) 09/17/2019 37* 0 - 32 IU/L Final   • Total Cholesterol 09/17/2019 142  100 - 199 mg/dL Final   • Triglycerides 09/17/2019 162* 0 - 149 mg/dL Final   • HDL Cholesterol 09/17/2019 32* >39 mg/dL Final   • VLDL Cholesterol 09/17/2019 32  5 - 40 mg/dL Final   • LDL Cholesterol  09/17/2019 78  0 - 99 mg/dL Final   • LDL/HDL Ratio 09/17/2019 2.4  0.0 - 3.2 ratio Final    Comment:                                     LDL/HDL Ratio                                              Men  Women                                1/2 Avg.Risk  1.0    1.5                                    Avg.Risk  3.6    3.2                                 2X Avg.Risk  6.2    5.0                                 3X Avg.Risk  8.0    6.1     • Hemoglobin A1C 09/17/2019 7.7* 4.8 - 5.6 % Final    Comment:          Prediabetes: 5.7 - 6.4           Diabetes: >6.4           Glycemic control for adults with diabetes: <7.0           Assessment/Plan   Diagnoses and all orders for this visit:    1. Type 2 diabetes mellitus without complication, without long-term current use of insulin (CMS/[facility]) (Primary)  -     Dulaglutide (TRULICITY) 1.5 MG/0.5ML solution pen-injector; Inject 1.5 mg under the skin into the appropriate area as directed 1 (One) Time Per Week.  Dispense: 12 pen; Refill: 3  -     Hemoglobin A1c; Future  -     Comprehensive  Metabolic Panel; Future    2. Obesity (BMI 30-39.9)  -     Dulaglutide (TRULICITY) 1.5 MG/0.5ML solution pen-injector; Inject 1.5 mg under the skin into the appropriate area as directed 1 (One) Time Per Week.  Dispense: 12 pen; Refill: 3    3. Need for prophylactic vaccination and inoculation against influenza  -     Flucelvax Quad=>4Years ([id number])      Increase Trulicity to 1.5 mg and discontinue glipizide         Return in about 2 months (around 12/14/2019) for Recheck, with Labs.      AVS given with handouts appropriate to diagnoses addressed

## 2019-10-22 RX ORDER — ACARBOSE 100 MG/1
TABLET ORAL
Qty: 270 TABLET | Refills: 1 | OUTPATIENT
Start: 2019-10-22

## 2019-12-10 ENCOUNTER — OFFICE VISIT (OUTPATIENT)
Dept: FAMILY MEDICINE CLINIC | Facility: CLINIC | Age: 44
End: 2019-12-10

## 2019-12-10 VITALS
DIASTOLIC BLOOD PRESSURE: 65 MMHG | WEIGHT: 212 LBS | BODY MASS INDEX: 35.32 KG/M2 | HEART RATE: 77 BPM | TEMPERATURE: 98.5 F | HEIGHT: 65 IN | SYSTOLIC BLOOD PRESSURE: 121 MMHG | OXYGEN SATURATION: 97 %

## 2019-12-10 DIAGNOSIS — E11.9 TYPE 2 DIABETES MELLITUS WITHOUT COMPLICATION, WITHOUT LONG-TERM CURRENT USE OF INSULIN (HCC): Primary | ICD-10-CM

## 2019-12-10 DIAGNOSIS — E66.9 OBESITY (BMI 30-39.9): ICD-10-CM

## 2019-12-10 LAB — GLUCOSE BLDC GLUCOMTR-MCNC: 99 MG/DL (ref 70–130)

## 2019-12-10 PROCEDURE — 82962 GLUCOSE BLOOD TEST: CPT | Performed by: FAMILY MEDICINE

## 2019-12-10 PROCEDURE — 99213 OFFICE O/P EST LOW 20 MIN: CPT | Performed by: FAMILY MEDICINE

## 2019-12-10 NOTE — PROGRESS NOTES
Chief Complaint   Patient presents with   • Diabetes       Subjective   Diabetes   Associated symptoms include weight loss. Pertinent negatives for diabetes include no chest pain.      Maria E Barroso is a 44 y.o. female.   She returns today for  follow-up since starting Trulicity 2 months ago.  She states she is feeling significantly better, no longer feeling weak and tired, having increased energy no longer having low sugars and not having to eat to deal with low sugars. Typically glucose 80 to 150 no longer on gymeperide She has lost 16 pounds without trying.  Denies continued nausea or diarrhea. Does reduce apptitite significantly    The following portions of the patient's history were reviewed and updated as appropriate: allergies, current medications,  family history, past medical history, social history,  surgical history and problem list.    Current Outpatient Medications on File Prior to Visit   Medication Sig   • atorvastatin (LIPITOR) 40 MG tablet Take 1 tablet by mouth Every Night.   • Blood Glucose Monitoring Suppl (ACCU-CHEK [place] PLUS) w/Device kit 1 kit by Other route Daily.   • Dulaglutide (TRULICITY) 1.5 MG/0.5ML solution pen-injector Inject 1.5 mg under the skin into the appropriate area as directed 1 (One) Time Per Week.   • glucose blood (ACCU-CHEK [place] PLUS) test strip 1 each by Other route Daily.   • Lancets (ACCU-CHEK SOFT TOUCH) lancets 1 each by Other route Daily.   • metFORMIN ER (GLUCOPHAGE-XR) 500 MG 24 hr tablet Take 2 tablets by mouth 2 (Two) Times a Day With Meals.   • tiZANidine (ZANAFLEX) 2 MG tablet TAKE 1 TO 2 TABLETS BY MOUTH AT NIGHT AS NEEDED     No current facility-administered medications on file prior to visit.          Review of Systems   Constitutional: Positive for unexpected weight loss. Negative for appetite change and fever.   HENT: Negative for congestion.    Eyes: Negative for visual disturbance.   Respiratory: Negative for cough, shortness of breath and wheezing.   "  Cardiovascular: Negative for chest pain, palpitations and leg swelling.   Gastrointestinal: Negative for abdominal pain, constipation, diarrhea, nausea, vomiting and indigestion.   Skin: Negative for rash.   Neurological: Negative for light-headedness, numbness and headache.   Psychiatric/Behavioral: Negative for sleep disturbance and depressed mood.       Objective   /65 (BP Location: Left arm, Patient Position: Sitting, Cuff Size: Adult)   Pulse 77   Temp 98.5 °F (36.9 °C) (Oral)   Ht 165.1 cm (65\")   Wt 96.2 kg (212 lb)   SpO2 97%   BMI 35.28 kg/m²     Physical Exam   Constitutional: She is oriented to person, place, and time. She appears well-developed and well-nourished.   HENT:   Head: Normocephalic and atraumatic.   Eyes: Pupils are equal, round, and reactive to light. Conjunctivae and EOM are normal.   Neck: Normal range of motion. No thyromegaly present.   Cardiovascular: Normal rate, regular rhythm and normal heart sounds.   No murmur heard.  Pulmonary/Chest: Breath sounds normal. She has no wheezes. She has no rales.   Musculoskeletal: Normal range of motion. She exhibits no edema.   Lymphadenopathy:     She has no cervical adenopathy.   Neurological: She is alert and oriented to person, place, and time.   Skin: Skin is warm and dry. No rash noted.   Psychiatric: She has a normal mood and affect.   Nursing note and vitals reviewed.      Office Visit on 12/10/2019   Component Date Value Ref Range Status   • Glucose 12/10/2019 99  70 - 130 mg/dL Final   Results Encounter on 09/17/2019   Component Date Value Ref Range Status   • Glucose 09/17/2019 75  65 - 99 mg/dL Final   • BUN 09/17/2019 11  6 - 24 mg/dL Final   • Creatinine 09/17/2019 0.73  0.57 - 1.00 mg/dL Final   • eGFR Non African Am 09/17/2019 99  >59 mL/min/1.73 Final   • eGFR African Am 09/17/2019 114  >59 mL/min/1.73 Final   • BUN/Creatinine Ratio 09/17/2019 15  9 - 23 Final   • Sodium 09/17/2019 140  134 - 144 mmol/L Final   • " Potassium 09/17/2019 4.4  3.5 - 5.2 mmol/L Final   • Chloride 09/17/2019 99  96 - 106 mmol/L Final   • Total CO2 09/17/2019 21  20 - 29 mmol/L Final   • Calcium 09/17/2019 9.8  8.7 - 10.2 mg/dL Final   • Total Protein 09/17/2019 7.4  6.0 - 8.5 g/dL Final   • Albumin 09/17/2019 4.6  3.5 - 5.5 g/dL Final   • Globulin 09/17/2019 2.8  1.5 - 4.5 g/dL Final   • A/G Ratio 09/17/2019 1.6  1.2 - 2.2 Final   • Total Bilirubin 09/17/2019 0.3  0.0 - 1.2 mg/dL Final   • Alkaline Phosphatase 09/17/2019 99  39 - 117 IU/L Final   • AST (SGOT) 09/17/2019 25  0 - 40 IU/L Final   • ALT (SGPT) 09/17/2019 37* 0 - 32 IU/L Final   • Total Cholesterol 09/17/2019 142  100 - 199 mg/dL Final   • Triglycerides 09/17/2019 162* 0 - 149 mg/dL Final   • HDL Cholesterol 09/17/2019 32* >39 mg/dL Final   • VLDL Cholesterol 09/17/2019 32  5 - 40 mg/dL Final   • LDL Cholesterol  09/17/2019 78  0 - 99 mg/dL Final   • LDL/HDL Ratio 09/17/2019 2.4  0.0 - 3.2 ratio Final    Comment:                                     LDL/HDL Ratio                                              Men  Women                                1/2 Avg.Risk  1.0    1.5                                    Avg.Risk  3.6    3.2                                 2X Avg.Risk  6.2    5.0                                 3X Avg.Risk  8.0    6.1     • Hemoglobin A1C 09/17/2019 7.7* 4.8 - 5.6 % Final    Comment:          Prediabetes: 5.7 - 6.4           Diabetes: >6.4           Glycemic control for adults with diabetes: <7.0       Office Visit on 12/10/2019   Component Date Value Ref Range Status   • Glucose 12/10/2019 99  70 - 130 mg/dL Final           Assessment/Plan   Diagnoses and all orders for this visit:    1. Type 2 diabetes mellitus without complication, without long-term current use of insulin (CMS/HCC) (Primary)  -     POC Glucose    2. Obesity (BMI 30-39.9)    labs today (A1c and CMP)  continue Trulicity to 1.5 mg weekly and metformen  Congratulated on significant weight reduction,  encouraged continued weight reduction       Return in about 3 months (around 3/10/2020) for Recheck, with Labs.      AVS given with handouts appropriate to diagnoses addressed

## 2019-12-11 LAB
ALBUMIN SERPL-MCNC: 4.7 G/DL (ref 3.5–5.5)
ALBUMIN/GLOB SERPL: 1.6 {RATIO} (ref 1.2–2.2)
ALP SERPL-CCNC: 102 IU/L (ref 39–117)
ALT SERPL-CCNC: 25 IU/L (ref 0–32)
AST SERPL-CCNC: 15 IU/L (ref 0–40)
BILIRUB SERPL-MCNC: 0.3 MG/DL (ref 0–1.2)
BUN SERPL-MCNC: 10 MG/DL (ref 6–24)
BUN/CREAT SERPL: 13 (ref 9–23)
CALCIUM SERPL-MCNC: 9.7 MG/DL (ref 8.7–10.2)
CHLORIDE SERPL-SCNC: 104 MMOL/L (ref 96–106)
CO2 SERPL-SCNC: 23 MMOL/L (ref 20–29)
CREAT SERPL-MCNC: 0.75 MG/DL (ref 0.57–1)
GLOBULIN SER CALC-MCNC: 3 G/DL (ref 1.5–4.5)
GLUCOSE SERPL-MCNC: 94 MG/DL (ref 65–99)
HBA1C MFR BLD: 6.4 % (ref 4.8–5.6)
POTASSIUM SERPL-SCNC: 4.2 MMOL/L (ref 3.5–5.2)
PROT SERPL-MCNC: 7.7 G/DL (ref 6–8.5)
SODIUM SERPL-SCNC: 143 MMOL/L (ref 134–144)

## 2019-12-14 ENCOUNTER — RESULTS ENCOUNTER (OUTPATIENT)
Dept: FAMILY MEDICINE CLINIC | Facility: CLINIC | Age: 44
End: 2019-12-14

## 2019-12-14 DIAGNOSIS — E11.9 TYPE 2 DIABETES MELLITUS WITHOUT COMPLICATION, WITHOUT LONG-TERM CURRENT USE OF INSULIN (HCC): ICD-10-CM

## 2020-03-10 ENCOUNTER — OFFICE VISIT (OUTPATIENT)
Dept: FAMILY MEDICINE CLINIC | Facility: CLINIC | Age: 45
End: 2020-03-10

## 2020-03-10 VITALS
OXYGEN SATURATION: 97 % | TEMPERATURE: 98.3 F | HEIGHT: 65 IN | HEART RATE: 82 BPM | WEIGHT: 213 LBS | BODY MASS INDEX: 35.49 KG/M2 | SYSTOLIC BLOOD PRESSURE: 106 MMHG | DIASTOLIC BLOOD PRESSURE: 67 MMHG

## 2020-03-10 DIAGNOSIS — E66.9 OBESITY (BMI 30-39.9): ICD-10-CM

## 2020-03-10 DIAGNOSIS — E11.9 TYPE 2 DIABETES MELLITUS WITHOUT COMPLICATION, WITHOUT LONG-TERM CURRENT USE OF INSULIN (HCC): Primary | ICD-10-CM

## 2020-03-10 DIAGNOSIS — Z12.31 ENCOUNTER FOR SCREENING MAMMOGRAM FOR BREAST CANCER: ICD-10-CM

## 2020-03-10 PROCEDURE — 99214 OFFICE O/P EST MOD 30 MIN: CPT | Performed by: FAMILY MEDICINE

## 2020-03-10 NOTE — PROGRESS NOTES
Chief Complaint   Patient presents with   • Diabetes   • Obesity       Subjective     Maria E Barroso is a 44 y.o. female.  She presents today to follow-up on diabetes and obesity.  She had initially done very well after starting Trulicity with her weight loss, weight has now stabilized.  She did not bring any glucose readings with her today.  Patient is requesting an order for mammogram, is due in May.      The following portions of the patient's history were reviewed and updated as appropriate: allergies, current medications, past family history, past medical history, past social history, past surgical history and problem list.    Current Outpatient Medications on File Prior to Visit   Medication Sig   • atorvastatin (LIPITOR) 40 MG tablet Take 1 tablet by mouth Every Night.   • Blood Glucose Monitoring Suppl (ACCU-CHEK [place] PLUS) w/Device kit 1 kit by Other route Daily.   • Dulaglutide (TRULICITY) 1.5 MG/0.5ML solution pen-injector Inject 1.5 mg under the skin into the appropriate area as directed 1 (One) Time Per Week.   • glucose blood (ACCU-CHEK [place] PLUS) test strip 1 each by Other route Daily.   • Lancets (ACCU-CHEK SOFT TOUCH) lancets 1 each by Other route Daily.   • metFORMIN ER (GLUCOPHAGE-XR) 500 MG 24 hr tablet Take 2 tablets by mouth 2 (Two) Times a Day With Meals.   • tiZANidine (ZANAFLEX) 2 MG tablet TAKE 1 TO 2 TABLETS BY MOUTH AT NIGHT AS NEEDED     No current facility-administered medications on file prior to visit.      There are no discontinued medications.    Review of Systems   Constitutional: Negative for appetite change, fever, unexpected weight gain and unexpected weight loss.   HENT: Negative for congestion.    Eyes: Negative for visual disturbance.   Respiratory: Negative for cough, shortness of breath and wheezing.    Cardiovascular: Negative for chest pain, palpitations and leg swelling.   Gastrointestinal: Negative for abdominal pain, constipation, diarrhea, nausea, vomiting and  "indigestion.   Skin: Negative for rash.   Neurological: Negative for light-headedness, numbness and headache.   Psychiatric/Behavioral: Negative for sleep disturbance and depressed mood.        Objective   Vitals:    03/10/20 1613   BP: 106/67   BP Location: Right arm   Patient Position: Sitting   Cuff Size: Adult   Pulse: 82   Temp: 98.3 °F (36.8 °C)   TempSrc: Oral   SpO2: 97%   Weight: 96.6 kg (213 lb)   Height: 165.1 cm (65\")      Body mass index is 35.45 kg/m².    Physical Exam   Constitutional: She is oriented to person, place, and time. She appears well-developed and well-nourished.   HENT:   Head: Normocephalic and atraumatic.   Eyes: Pupils are equal, round, and reactive to light. Conjunctivae and EOM are normal.   Neck: Normal range of motion. No thyromegaly present.   Cardiovascular: Normal rate, regular rhythm and normal heart sounds.   No murmur heard.  Pulmonary/Chest: Breath sounds normal. She has no wheezes. She has no rales.   Musculoskeletal: Normal range of motion. She exhibits no edema.   Lymphadenopathy:     She has no cervical adenopathy.   Neurological: She is alert and oriented to person, place, and time.   Skin: Skin is warm and dry. No rash noted.   Psychiatric: She has a normal mood and affect.   Nursing note and vitals reviewed.          Lab Results   Component Value Date    HGBA1C 6.4 (H) 12/10/2019    HGBA1C 7.7 (H) 09/17/2019    The 10-year ASCVD risk score (Sophie MARILYN Jr., et al., 2013) is: 1.5%    Values used to calculate the score:      Age: 44 years      Sex: Female      Is Non- : No      Diabetic: Yes      Tobacco smoker: No      Systolic Blood Pressure: 106 mmHg      Is BP treated: No      HDL Cholesterol: 32 mg/dL      Total Cholesterol: 142 mg/dL      Procedures     Assessment/Plan   Diagnoses and all orders for this visit:    1. Type 2 diabetes mellitus without complication, without long-term current use of insulin (CMS/[facility]) (Primary)  -     Cancel: Mammo " Screening Digital Tomosynthesis Bilateral With CAD  -     Comprehensive Metabolic Panel  -     Hemoglobin A1c    2. Obesity (BMI 30-39.9)    3. Encounter for screening mammogram for breast cancer  -     Mammo Screening Digital Tomosynthesis Bilateral With CAD    Diabetes, checking A1c today, encouraged to continue to follow a low concentrated sweets diet.  Discussed meal planning, patient is eating breakfast and lunch on the go while at work for having a hard boiled egg, 2 pieces of linn and toast in the morning and some meat, cheese, crackers or a piece of fruit for lunch.  Discussed needing to balance proteins and carbohydrates, avoiding high-fat meats such as linn, adding in healthier protein choices such as nuts or yogurt for snacks.    There are no discontinued medications.       Return in about 3 months (around 6/10/2020) for Recheck, diabetes and weight.        AVS given have given booklet on carb counting.

## 2020-03-12 LAB
ALBUMIN SERPL-MCNC: 4.7 G/DL (ref 3.8–4.8)
ALBUMIN/GLOB SERPL: 1.7 {RATIO} (ref 1.2–2.2)
ALP SERPL-CCNC: 99 IU/L (ref 39–117)
ALT SERPL-CCNC: 28 IU/L (ref 0–32)
AST SERPL-CCNC: 17 IU/L (ref 0–40)
BILIRUB SERPL-MCNC: 0.3 MG/DL (ref 0–1.2)
BUN SERPL-MCNC: 10 MG/DL (ref 6–24)
BUN/CREAT SERPL: 14 (ref 9–23)
CALCIUM SERPL-MCNC: 9.9 MG/DL (ref 8.7–10.2)
CHLORIDE SERPL-SCNC: 101 MMOL/L (ref 96–106)
CO2 SERPL-SCNC: 21 MMOL/L (ref 20–29)
CREAT SERPL-MCNC: 0.73 MG/DL (ref 0.57–1)
GLOBULIN SER CALC-MCNC: 2.8 G/DL (ref 1.5–4.5)
GLUCOSE SERPL-MCNC: 107 MG/DL (ref 65–99)
HBA1C MFR BLD: 6.4 % (ref 4.8–5.6)
POTASSIUM SERPL-SCNC: 4.9 MMOL/L (ref 3.5–5.2)
PROT SERPL-MCNC: 7.5 G/DL (ref 6–8.5)
SODIUM SERPL-SCNC: 141 MMOL/L (ref 134–144)

## 2020-03-13 NOTE — PROGRESS NOTES
Please inform patientThat A1c has maintained at 6.4, same as 3 months ago keep up the great work on keeping your diabetes controlled.

## 2020-06-06 ENCOUNTER — HOSPITAL ENCOUNTER (OUTPATIENT)
Dept: MAMMOGRAPHY | Facility: HOSPITAL | Age: 45
Discharge: HOME | End: 2020-06-06
Admitting: FAMILY MEDICINE

## 2020-06-06 PROCEDURE — 77063 BREAST TOMOSYNTHESIS BI: CPT

## 2020-06-06 PROCEDURE — 77067 SCR MAMMO BI INCL CAD: CPT

## 2020-06-08 DIAGNOSIS — R92.8 ABNORMALITY OF RIGHT BREAST ON SCREENING MAMMOGRAM: Primary | ICD-10-CM

## 2020-06-10 ENCOUNTER — OFFICE VISIT (OUTPATIENT)
Dept: FAMILY MEDICINE CLINIC | Facility: CLINIC | Age: 45
End: 2020-06-10

## 2020-06-10 VITALS
HEIGHT: 65 IN | HEART RATE: 94 BPM | DIASTOLIC BLOOD PRESSURE: 72 MMHG | OXYGEN SATURATION: 96 % | SYSTOLIC BLOOD PRESSURE: 117 MMHG | TEMPERATURE: 98.6 F | WEIGHT: 210 LBS | BODY MASS INDEX: 34.99 KG/M2

## 2020-06-10 DIAGNOSIS — R92.8 ABNORMAL MAMMOGRAM: ICD-10-CM

## 2020-06-10 DIAGNOSIS — E66.9 OBESITY (BMI 30-39.9): ICD-10-CM

## 2020-06-10 DIAGNOSIS — E11.9 TYPE 2 DIABETES MELLITUS WITHOUT COMPLICATION, WITHOUT LONG-TERM CURRENT USE OF INSULIN (HCC): Primary | ICD-10-CM

## 2020-06-10 DIAGNOSIS — R94.5 NONSPECIFIC ABNORMAL RESULTS OF FUNCTION STUDY OF LIVER: ICD-10-CM

## 2020-06-10 PROCEDURE — 99214 OFFICE O/P EST MOD 30 MIN: CPT | Performed by: FAMILY MEDICINE

## 2020-06-10 RX ORDER — ONDANSETRON 4 MG/1
4 TABLET, FILM COATED ORAL EVERY 8 HOURS PRN
Qty: 20 TABLET | Refills: 1 | Status: SHIPPED | OUTPATIENT
Start: 2020-06-10 | End: 2020-09-04

## 2020-06-10 NOTE — PROGRESS NOTES
Chief Complaint   Patient presents with   • Diabetes       Subjective     Maria E Barroso is a 44 y.o. female.      She presents today to follow-up on diabetes and obesity.  She had initially done very well after starting Trulicity with her weight loss has slowed but have lost another 3# in past 3 months. If do not eat will get nausea after taking metformin.  Frequently not having an appetite for breakfast and not eating until lunch.  She has reduced carbs significantly, hardly ever eating any bread.  She did not bring any glucose readings with her today but states glucose readings all <105.      The following portions of the patient's history were reviewed and updated as appropriate: allergies, current medications, past family history, past medical history, past social history, past surgical history and problem list.    Current Outpatient Medications on File Prior to Visit   Medication Sig   • atorvastatin (LIPITOR) 40 MG tablet Take 1 tablet by mouth Every Night.   • Blood Glucose Monitoring Suppl (ACCU-CHEK [place] PLUS) w/Device kit 1 kit by Other route Daily.   • Dulaglutide (TRULICITY) 1.5 MG/0.5ML solution pen-injector Inject 1.5 mg under the skin into the appropriate area as directed 1 (One) Time Per Week.   • glucose blood (ACCU-CHEK [place] PLUS) test strip 1 each by Other route Daily.   • Lancets (ACCU-CHEK SOFT TOUCH) lancets 1 each by Other route Daily.   • tiZANidine (ZANAFLEX) 2 MG tablet TAKE 1 TO 2 TABLETS BY MOUTH AT NIGHT AS NEEDED   • [DISCONTINUED] metFORMIN ER (GLUCOPHAGE-XR) 500 MG 24 hr tablet Take 2 tablets by mouth 2 (Two) Times a Day With Meals.     No current facility-administered medications on file prior to visit.      Medications Discontinued During This Encounter   Medication Reason   • metFORMIN ER (GLUCOPHAGE-XR) 500 MG 24 hr tablet Other- See Medication Note       Review of Systems   Constitutional: Positive for appetite change. Negative for fever, unexpected weight gain and  "unexpected weight loss.   HENT: Negative for congestion.    Eyes: Negative for visual disturbance.   Respiratory: Negative for cough, shortness of breath and wheezing.    Cardiovascular: Negative for chest pain, palpitations and leg swelling.   Gastrointestinal: Positive for nausea. Negative for abdominal pain, constipation, diarrhea, vomiting and indigestion.   Skin: Negative for rash.   Neurological: Negative for light-headedness, numbness and headache.   Psychiatric/Behavioral: Negative for sleep disturbance and depressed mood.        Objective   Vitals:    06/10/20 1553   BP: 117/72   BP Location: Right arm   Patient Position: Sitting   Cuff Size: Adult   Pulse: 94   Temp: 98.6 °F (37 °C)   TempSrc: Infrared   SpO2: 96%   Weight: 95.3 kg (210 lb)   Height: 165.1 cm (65\")      Body mass index is 34.95 kg/m².    Physical Exam   Constitutional: She is oriented to person, place, and time. She appears well-developed and well-nourished.   HENT:   Head: Normocephalic and atraumatic.   Eyes: Pupils are equal, round, and reactive to light. Conjunctivae and EOM are normal.   Neck: Normal range of motion.   Musculoskeletal: Normal range of motion.   Neurological: She is alert and oriented to person, place, and time.   Skin: Skin is warm and dry. No rash noted.   Psychiatric: She has a normal mood and affect.   Nursing note and vitals reviewed.    No visits with results within 3 Month(s) from this visit.   Latest known visit with results is:   Office Visit on 03/10/2020   Component Date Value Ref Range Status   • Glucose 03/11/2020 107* 65 - 99 mg/dL Final   • BUN 03/11/2020 10  6 - 24 mg/dL Final   • Creatinine 03/11/2020 0.73  0.57 - 1.00 mg/dL Final   • eGFR Non African Am 03/11/2020 99  >59 mL/min/1.73 Final   • eGFR African Am 03/11/2020 114  >59 mL/min/1.73 Final   • BUN/Creatinine Ratio 03/11/2020 14  9 - 23 Final   • Sodium 03/11/2020 141  134 - 144 mmol/L Final   • Potassium 03/11/2020 4.9  3.5 - 5.2 mmol/L Final "   • Chloride 03/11/2020 101  96 - 106 mmol/L Final   • Total CO2 03/11/2020 21  20 - 29 mmol/L Final   • Calcium 03/11/2020 9.9  8.7 - 10.2 mg/dL Final   • Total Protein 03/11/2020 7.5  6.0 - 8.5 g/dL Final   • Albumin 03/11/2020 4.7  3.8 - 4.8 g/dL Final   • Globulin 03/11/2020 2.8  1.5 - 4.5 g/dL Final   • A/G Ratio 03/11/2020 1.7  1.2 - 2.2 Final   • Total Bilirubin 03/11/2020 0.3  0.0 - 1.2 mg/dL Final   • Alkaline Phosphatase 03/11/2020 99  39 - 117 IU/L Final   • AST (SGOT) 03/11/2020 17  0 - 40 IU/L Final   • ALT (SGPT) 03/11/2020 28  0 - 32 IU/L Final   • Hemoglobin A1C 03/11/2020 6.4* 4.8 - 5.6 % Final    Comment:          Prediabetes: 5.7 - 6.4           Diabetes: >6.4           Glycemic control for adults with diabetes: <7.0             Lab Results   Component Value Date    HGBA1C 6.4 (H) 03/11/2020    HGBA1C 6.4 (H) 12/10/2019    HGBA1C 7.7 (H) 09/17/2019    The 10-year ASCVD risk score (Sophie SANDERS Jr., et al., 2013) is: 1.9%    Values used to calculate the score:      Age: 44 years      Sex: Female      Is Non- : No      Diabetic: Yes      Tobacco smoker: No      Systolic Blood Pressure: 117 mmHg      Is BP treated: No      HDL Cholesterol: 32 mg/dL      Total Cholesterol: 142 mg/dL     Study Result     MAMMO SCREENING DIGITAL TOMOSYNTHESIS BILATERAL W CAD-     Date of Exam: 6/6/2020 7:21 AM     Indication: Screening.     Comparison: 05/23/2019, 10/21/2017, 04/16/2016, 10/07/2015.     Technique: MLO and CC views of bilateral breast(s) were obtained  according to routine screening breast mammography protocol with  tomosynthesis.       The mammographic images were interpreted with the assistance of a  computer aided detection system.      FINDINGS:  The breasts are heterogenously dense, which may obscure small masses.      7 mm oval circumscribed mass in the slightly upper right breast,  posterior depth, visible on the MLO view only. This is probably an  intramammary lymph node that  was not included on previous mammograms due  to its depth. Recommend diagnostic mammogram/ultrasound for  confirmation.      There are no suspicious masses, suspicious microcalcifications, or  architectural distortion in the left breast.      IMPRESSION:  7 mm mass in the right breast, probably an intramammary lymph node that  was not included on previous exams due to its depth. Recommend right  diagnostic mammogram and targeted right breast ultrasound for  confirmation.     BI-RADS ASSESSMENT: BI-RADS 0. Incomplete.  Mammography Incomplete -  Need Additional Imaging Evaluation and/or Prior Mammograms for  Comparison.        The patient's information is entered into a computerized reminder system  with a targeted due date for the next mammogram.     Note:  It has been reported that there is approximately a 15% false  negative in mammography.  Therefore, management of a palpable  abnormality should not be deferred because of a negative mammogram.     Patients with mammographically dense breasts will be notified by mail,  according to [place] law.  We believe these women should undergo risk  assessment, taking into consideration breast density and other factors,  including but not limited to, family history of breast cancer and other  malignancies and personal history of breast cancer or high risk lesions.   Women who are found to have lifetime risk of greater than 20-25% may  benefit from additional screening, such as with Breast MRI.       Electronically Signed By-Abby Granados On:6/8/2020 8:37 AM  This report was finalized on [id number] by  Abby Granados, .       mammogram 6/6/2020 BI-RADS 0, diagnostic mammogram and ultrasound have been ordered-patient has been instructed to call to get these scheduled.  Procedures     Assessment/Plan   Diagnoses and all orders for this visit:    1. Type 2 diabetes mellitus without complication, without long-term current use of insulin (CMS/[facility]) (Primary)  -     Hemoglobin A1c  -      Comprehensive Metabolic Panel  -     ondansetron (Zofran) 4 MG tablet; Take 1 tablet by mouth Every 8 (Eight) Hours As Needed for Nausea or Vomiting.  Dispense: 20 tablet; Refill: 1  -     metFORMIN (Glucophage) 500 MG tablet; Take 1 tablet by mouth 2 (Two) Times a Day With Meals.  Dispense: 360 tablet; Refill: 3    2. Obesity (BMI 30-39.9)    3. Nonspecific abnormal results of function study of liver  -     Comprehensive Metabolic Panel    4. Abnormal mammogram       Diabetes, A1c at goal, continue current medications, encouraged to continue to follow a low concentrated sweets diet, increase physical activity, and continued weight reduction.  Recommend taking first dose of metformin with her first meal which may help reduce nausea, giving prescription for Zofran to use as needed.  If A1c is continuing to improve may reduce dose of metformin.    Medications Discontinued During This Encounter   Medication Reason   • metFORMIN ER (GLUCOPHAGE-XR) 500 MG 24 hr tablet Other- See Medication Note          Return in about 3 months (around 9/10/2020) for Recheck, diabetes and weight, with Labs.        AVS given

## 2020-06-11 LAB
ALBUMIN SERPL-MCNC: 4.5 G/DL (ref 3.8–4.8)
ALBUMIN/GLOB SERPL: 1.7 {RATIO} (ref 1.2–2.2)
ALP SERPL-CCNC: 91 IU/L (ref 39–117)
ALT SERPL-CCNC: 29 IU/L (ref 0–32)
AST SERPL-CCNC: 18 IU/L (ref 0–40)
BILIRUB SERPL-MCNC: 0.2 MG/DL (ref 0–1.2)
BUN SERPL-MCNC: 10 MG/DL (ref 6–24)
BUN/CREAT SERPL: 14 (ref 9–23)
CALCIUM SERPL-MCNC: 10.4 MG/DL (ref 8.7–10.2)
CHLORIDE SERPL-SCNC: 103 MMOL/L (ref 96–106)
CO2 SERPL-SCNC: 25 MMOL/L (ref 20–29)
CREAT SERPL-MCNC: 0.73 MG/DL (ref 0.57–1)
GLOBULIN SER CALC-MCNC: 2.7 G/DL (ref 1.5–4.5)
GLUCOSE SERPL-MCNC: 142 MG/DL (ref 65–99)
HBA1C MFR BLD: 6.5 % (ref 4.8–5.6)
POTASSIUM SERPL-SCNC: 4.6 MMOL/L (ref 3.5–5.2)
PROT SERPL-MCNC: 7.2 G/DL (ref 6–8.5)
SODIUM SERPL-SCNC: 142 MMOL/L (ref 134–144)

## 2020-06-12 NOTE — PROGRESS NOTES
Please inform patient that A1c is at goal at 6.5.  Continue medications per list and low concentrated sweets diet.

## 2020-06-19 ENCOUNTER — HOSPITAL ENCOUNTER (OUTPATIENT)
Dept: ULTRASOUND IMAGING | Facility: HOSPITAL | Age: 45
Discharge: HOME | End: 2020-06-19

## 2020-06-19 ENCOUNTER — HOSPITAL ENCOUNTER (OUTPATIENT)
Dept: MAMMOGRAPHY | Facility: HOSPITAL | Age: 45
Discharge: HOME | End: 2020-06-19
Admitting: FAMILY MEDICINE

## 2020-06-19 DIAGNOSIS — R92.8 ABNORMALITY OF RIGHT BREAST ON SCREENING MAMMOGRAM: ICD-10-CM

## 2020-06-19 PROCEDURE — G0279 TOMOSYNTHESIS, MAMMO: HCPCS

## 2020-06-19 PROCEDURE — 76642 ULTRASOUND BREAST LIMITED: CPT

## 2020-06-19 PROCEDURE — 77065 DX MAMMO INCL CAD UNI: CPT

## 2020-06-22 NOTE — PROGRESS NOTES
Please inform patient that her diagnostic mammogram and ultrasound has been reported as BI-RADS 2, benign changes but no evidence of cancer, should repeat routine bilateral screening in 1 year

## 2020-09-02 DIAGNOSIS — E11.9 TYPE 2 DIABETES MELLITUS WITHOUT COMPLICATION, WITHOUT LONG-TERM CURRENT USE OF INSULIN (HCC): ICD-10-CM

## 2020-09-03 DIAGNOSIS — E11.9 TYPE 2 DIABETES MELLITUS WITHOUT COMPLICATION, WITHOUT LONG-TERM CURRENT USE OF INSULIN (HCC): ICD-10-CM

## 2020-09-03 DIAGNOSIS — E78.01 FAMILIAL HYPERCHOLESTEROLEMIA: ICD-10-CM

## 2020-09-04 RX ORDER — ONDANSETRON 4 MG/1
4 TABLET, FILM COATED ORAL EVERY 8 HOURS PRN
Qty: 20 TABLET | Refills: 1 | Status: SHIPPED | OUTPATIENT
Start: 2020-09-04 | End: 2022-02-23

## 2020-09-04 RX ORDER — ATORVASTATIN CALCIUM 40 MG/1
TABLET, FILM COATED ORAL
Qty: 90 TABLET | Refills: 3 | Status: SHIPPED | OUTPATIENT
Start: 2020-09-04 | End: 2021-08-16 | Stop reason: SDUPTHER

## 2020-09-29 ENCOUNTER — OFFICE VISIT (OUTPATIENT)
Dept: FAMILY MEDICINE CLINIC | Facility: CLINIC | Age: 45
End: 2020-09-29

## 2020-09-29 VITALS
BODY MASS INDEX: 34.16 KG/M2 | HEIGHT: 65 IN | WEIGHT: 205 LBS | OXYGEN SATURATION: 98 % | SYSTOLIC BLOOD PRESSURE: 103 MMHG | HEART RATE: 78 BPM | DIASTOLIC BLOOD PRESSURE: 68 MMHG | TEMPERATURE: 97.3 F

## 2020-09-29 DIAGNOSIS — E11.9 TYPE 2 DIABETES MELLITUS WITHOUT COMPLICATION, WITHOUT LONG-TERM CURRENT USE OF INSULIN (HCC): Primary | ICD-10-CM

## 2020-09-29 DIAGNOSIS — Z23 NEED FOR INFLUENZA VACCINATION: ICD-10-CM

## 2020-09-29 DIAGNOSIS — Z11.59 ENCOUNTER FOR HEPATITIS C SCREENING TEST FOR LOW RISK PATIENT: ICD-10-CM

## 2020-09-29 DIAGNOSIS — R94.5 NONSPECIFIC ABNORMAL RESULTS OF FUNCTION STUDY OF LIVER: ICD-10-CM

## 2020-09-29 DIAGNOSIS — E66.9 OBESITY (BMI 30-39.9): ICD-10-CM

## 2020-09-29 DIAGNOSIS — E78.2 MIXED HYPERLIPIDEMIA: ICD-10-CM

## 2020-09-29 PROCEDURE — 90471 IMMUNIZATION ADMIN: CPT | Performed by: FAMILY MEDICINE

## 2020-09-29 PROCEDURE — 90686 IIV4 VACC NO PRSV 0.5 ML IM: CPT | Performed by: FAMILY MEDICINE

## 2020-09-29 PROCEDURE — 99214 OFFICE O/P EST MOD 30 MIN: CPT | Performed by: FAMILY MEDICINE

## 2020-09-29 NOTE — PROGRESS NOTES
Chief Complaint   Patient presents with   • Diabetes       Subjective     Maria E Barroso is a 45 y.o. female.   Patient here to follow up on her diabetes, has not had labs, fasting in anticipation of labs this morning. Am readings 100 to 105 after work as low as 87.     Mom is now in a nursing home in a memory care unit. Have to visit through a window.     She denies any symptoms of lightheadedness or presyncope with her low blood pressures.  She is not on antihypertensive therapy.    Patient had hysterectomy for endometriosis    Brother and father had polyps but not colon cancer she has not had her first screening colonoscopy.    Patient has not had a Pneumovax vaccination her DTaP was 10 years ago.  The following portions of the patient's history were reviewed and updated as appropriate: allergies, current medications, past family history, past medical history, past social history, past surgical history and problem list.    Current Outpatient Medications on File Prior to Visit   Medication Sig   • atorvastatin (LIPITOR) 40 MG tablet TAKE 1 TABLET BY MOUTH EVERY DAY AT NIGHT   • Blood Glucose Monitoring Suppl (ACCU-CHEK [place] PLUS) w/Device kit 1 kit by Other route Daily.   • Dulaglutide (TRULICITY) 1.5 MG/0.5ML solution pen-injector Inject 1.5 mg under the skin into the appropriate area as directed 1 (One) Time Per Week.   • glucose blood (ACCU-CHEK [place] PLUS) test strip 1 each by Other route Daily.   • Lancets (ACCU-CHEK SOFT TOUCH) lancets 1 each by Other route Daily.   • metFORMIN (Glucophage) 500 MG tablet Take 1 tablet by mouth 2 (Two) Times a Day With Meals.   • ondansetron (ZOFRAN) 4 MG tablet TAKE 1 TABLET BY MOUTH EVERY 8 (EIGHT) HOURS AS NEEDED FOR NAUSEA OR VOMITING.   • tiZANidine (ZANAFLEX) 2 MG tablet TAKE 1 TO 2 TABLETS BY MOUTH AT NIGHT AS NEEDED     No current facility-administered medications on file prior to visit.      There are no discontinued medications.    Review of Systems  "  Constitutional: Positive for appetite change and unexpected weight loss. Negative for fever and unexpected weight gain.   HENT: Negative for congestion.    Eyes: Negative for visual disturbance.   Respiratory: Negative for cough, shortness of breath and wheezing.    Cardiovascular: Negative for chest pain, palpitations and leg swelling.   Gastrointestinal: Positive for nausea. Negative for abdominal pain, constipation, diarrhea, vomiting and indigestion.   Skin: Negative for rash.   Neurological: Negative for light-headedness, numbness and headache.   Psychiatric/Behavioral: Negative for sleep disturbance and depressed mood.        Objective   Vitals:    09/29/20 0830   BP: 103/68   BP Location: Left arm   Patient Position: Sitting   Cuff Size: Adult   Pulse: 78   Temp: 97.3 °F (36.3 °C)   TempSrc: Infrared   SpO2: 98%   Weight: 93 kg (205 lb)   Height: 165.1 cm (65\")      Body mass index is 34.11 kg/m².  Down an additional 5 pounds in the past 3 months  Physical Exam        Hemoglobin A1C   Date Value Ref Range Status   06/10/2020 6.5 (H) 4.8 - 5.6 % Final     Comment:              Prediabetes: 5.7 - 6.4           Diabetes: >6.4           Glycemic control for adults with diabetes: <7.0     03/11/2020 6.4 (H) 4.8 - 5.6 % Final     Comment:              Prediabetes: 5.7 - 6.4           Diabetes: >6.4           Glycemic control for adults with diabetes: <7.0     12/10/2019 6.4 (H) 4.8 - 5.6 % Final     Comment:              Prediabetes: 5.7 - 6.4           Diabetes: >6.4           Glycemic control for adults with diabetes: <7.0     09/17/2019 7.7 (H) 4.8 - 5.6 % Final     Comment:              Prediabetes: 5.7 - 6.4           Diabetes: >6.4           Glycemic control for adults with diabetes: <7.0        The 10-year ASCVD risk score (Sophie SANDERS Jr., et al., 2013) is: 1.6%    Values used to calculate the score:      Age: 45 years      Sex: Female      Is Non- : No      Diabetic: Yes      Tobacco " smoker: No      Systolic Blood Pressure: 103 mmHg      Is BP treated: No      HDL Cholesterol: 32 mg/dL      Total Cholesterol: 142 mg/dL    Procedures     Assessment/Plan   Diagnoses and all orders for this visit:    1. Type 2 diabetes mellitus without complication, without long-term current use of insulin (CMS/[facility]) (Primary)  -     Hemoglobin A1c  -     Comprehensive Metabolic Panel  -     MicroAlbumin, Urine, Random - Urine, Clean Catch    2. Obesity (BMI 30-39.9)  -     Comprehensive Metabolic Panel    3. Nonspecific abnormal results of function study of liver    4. Mixed hyperlipidemia  -     Comprehensive Metabolic Panel  -     Lipid Panel    5. Encounter for hepatitis C screening test for low risk patient  -     Hepatitis C Antibody    Diabetes, very good control, continue current medications.  If A1c is excessively low would consider reducing metformin.  Do encourage continued weight reduction through low calorie diet and increase physical activity.  Influenza vaccination given today.  She declines both a tetanus and a Pneumovax.  She declines screening colonoscopy.  She does have eye exam with Dr. Diamond's office tomorrow.  Labs as ordered above.      There are no discontinued medications.       Return for  soon for annual PE/foot exam and 3 months for diabetes, with Labs prior.    Education reference material relevant to visit available in AVS through [employer] or printed copy given.  Answers for HPI/ROS submitted by the patient on 9/27/2020   What is the primary reason for your visit?: Other  Please describe your symptoms.: 3 month check up for my diabetes  Have you had these symptoms before?: No  How long have you been having these symptoms?: 1-4 days

## 2020-09-30 LAB
ALBUMIN SERPL-MCNC: 4.8 G/DL (ref 3.8–4.8)
ALBUMIN/GLOB SERPL: 1.7 {RATIO} (ref 1.2–2.2)
ALP SERPL-CCNC: 102 IU/L (ref 39–117)
ALT SERPL-CCNC: 34 IU/L (ref 0–32)
AST SERPL-CCNC: 22 IU/L (ref 0–40)
BILIRUB SERPL-MCNC: 0.3 MG/DL (ref 0–1.2)
BUN SERPL-MCNC: 13 MG/DL (ref 6–24)
BUN/CREAT SERPL: 17 (ref 9–23)
CALCIUM SERPL-MCNC: 9.6 MG/DL (ref 8.7–10.2)
CHLORIDE SERPL-SCNC: 102 MMOL/L (ref 96–106)
CHOLEST SERPL-MCNC: 135 MG/DL (ref 100–199)
CO2 SERPL-SCNC: 19 MMOL/L (ref 20–29)
CREAT SERPL-MCNC: 0.76 MG/DL (ref 0.57–1)
GLOBULIN SER CALC-MCNC: 2.9 G/DL (ref 1.5–4.5)
GLUCOSE SERPL-MCNC: 88 MG/DL (ref 65–99)
HBA1C MFR BLD: 6.1 % (ref 4.8–5.6)
HCV AB S/CO SERPL IA: <0.1 S/CO RATIO (ref 0–0.9)
HDLC SERPL-MCNC: 31 MG/DL
LDLC SERPL CALC-MCNC: 78 MG/DL (ref 0–99)
MICROALBUMIN UR-MCNC: 10.7 UG/ML
POTASSIUM SERPL-SCNC: 4.9 MMOL/L (ref 3.5–5.2)
PROT SERPL-MCNC: 7.7 G/DL (ref 6–8.5)
SODIUM SERPL-SCNC: 140 MMOL/L (ref 134–144)
TRIGL SERPL-MCNC: 150 MG/DL (ref 0–149)
VLDLC SERPL CALC-MCNC: 26 MG/DL (ref 5–40)

## 2020-10-23 ENCOUNTER — TELEPHONE (OUTPATIENT)
Dept: FAMILY MEDICINE CLINIC | Facility: CLINIC | Age: 45
End: 2020-10-23

## 2020-10-23 ENCOUNTER — OFFICE VISIT (OUTPATIENT)
Dept: FAMILY MEDICINE CLINIC | Facility: CLINIC | Age: 45
End: 2020-10-23

## 2020-10-23 VITALS
HEIGHT: 65 IN | WEIGHT: 207 LBS | TEMPERATURE: 96.8 F | HEART RATE: 74 BPM | BODY MASS INDEX: 34.49 KG/M2 | OXYGEN SATURATION: 97 % | SYSTOLIC BLOOD PRESSURE: 99 MMHG | DIASTOLIC BLOOD PRESSURE: 64 MMHG

## 2020-10-23 DIAGNOSIS — E66.9 OBESITY (BMI 30-39.9): ICD-10-CM

## 2020-10-23 DIAGNOSIS — E11.9 TYPE 2 DIABETES MELLITUS WITHOUT COMPLICATION, WITHOUT LONG-TERM CURRENT USE OF INSULIN (HCC): Primary | ICD-10-CM

## 2020-10-23 DIAGNOSIS — Z23 NEED FOR TDAP VACCINATION: ICD-10-CM

## 2020-10-23 DIAGNOSIS — E11.9 TYPE 2 DIABETES MELLITUS WITHOUT COMPLICATION, WITHOUT LONG-TERM CURRENT USE OF INSULIN (HCC): ICD-10-CM

## 2020-10-23 DIAGNOSIS — Z00.00 ANNUAL PHYSICAL EXAM: Primary | ICD-10-CM

## 2020-10-23 DIAGNOSIS — Z23 NEED FOR PNEUMOCOCCAL VACCINATION: ICD-10-CM

## 2020-10-23 DIAGNOSIS — L65.9 ALOPECIA: ICD-10-CM

## 2020-10-23 PROCEDURE — 90472 IMMUNIZATION ADMIN EACH ADD: CPT | Performed by: FAMILY MEDICINE

## 2020-10-23 PROCEDURE — 90715 TDAP VACCINE 7 YRS/> IM: CPT | Performed by: FAMILY MEDICINE

## 2020-10-23 PROCEDURE — 90471 IMMUNIZATION ADMIN: CPT | Performed by: FAMILY MEDICINE

## 2020-10-23 PROCEDURE — 99212 OFFICE O/P EST SF 10 MIN: CPT | Performed by: FAMILY MEDICINE

## 2020-10-23 PROCEDURE — 90732 PPSV23 VACC 2 YRS+ SUBQ/IM: CPT | Performed by: FAMILY MEDICINE

## 2020-10-23 PROCEDURE — 99396 PREV VISIT EST AGE 40-64: CPT | Performed by: FAMILY MEDICINE

## 2020-10-23 RX ORDER — MINOXIDIL 2 %
1 SOLUTION, NON-ORAL TOPICAL DAILY
Qty: 180 G | Refills: 3 | Status: SHIPPED | OUTPATIENT
Start: 2020-10-23 | End: 2021-08-24

## 2020-10-23 RX ORDER — DULAGLUTIDE 1.5 MG/.5ML
0.5 INJECTION, SOLUTION SUBCUTANEOUS WEEKLY
Qty: 12 PEN | Refills: 3 | Status: SHIPPED | OUTPATIENT
Start: 2020-10-23 | End: 2021-01-25 | Stop reason: DRUGHIGH

## 2020-10-23 RX ORDER — MINOXIDIL 2 %
1 SOLUTION, NON-ORAL TOPICAL DAILY
Qty: 60 G | Refills: 12 | Status: SHIPPED | OUTPATIENT
Start: 2020-10-23 | End: 2020-10-23 | Stop reason: SDUPTHER

## 2020-10-23 NOTE — TELEPHONE ENCOUNTER
Pt has appt dec 30 and note said to have diabetes lab prior. Patient scheduled for dec 28 for labs but no orders in chart. Please advise.

## 2020-12-25 ENCOUNTER — RESULTS ENCOUNTER (OUTPATIENT)
Dept: FAMILY MEDICINE CLINIC | Facility: CLINIC | Age: 45
End: 2020-12-25

## 2020-12-25 DIAGNOSIS — E11.9 TYPE 2 DIABETES MELLITUS WITHOUT COMPLICATION, WITHOUT LONG-TERM CURRENT USE OF INSULIN (HCC): ICD-10-CM

## 2020-12-29 LAB
ALBUMIN SERPL-MCNC: 4.7 G/DL (ref 3.8–4.8)
ALBUMIN/GLOB SERPL: 1.6 {RATIO} (ref 1.2–2.2)
ALP SERPL-CCNC: 101 IU/L (ref 39–117)
ALT SERPL-CCNC: 26 IU/L (ref 0–32)
AST SERPL-CCNC: 16 IU/L (ref 0–40)
BILIRUB SERPL-MCNC: 0.3 MG/DL (ref 0–1.2)
BUN SERPL-MCNC: 13 MG/DL (ref 6–24)
BUN/CREAT SERPL: 14 (ref 9–23)
CALCIUM SERPL-MCNC: 10.1 MG/DL (ref 8.7–10.2)
CHLORIDE SERPL-SCNC: 100 MMOL/L (ref 96–106)
CO2 SERPL-SCNC: 24 MMOL/L (ref 20–29)
CREAT SERPL-MCNC: 0.91 MG/DL (ref 0.57–1)
GLOBULIN SER CALC-MCNC: 3 G/DL (ref 1.5–4.5)
GLUCOSE SERPL-MCNC: 121 MG/DL (ref 65–99)
HBA1C MFR BLD: 5.9 % (ref 4.8–5.6)
POTASSIUM SERPL-SCNC: 4.4 MMOL/L (ref 3.5–5.2)
PROT SERPL-MCNC: 7.7 G/DL (ref 6–8.5)
SODIUM SERPL-SCNC: 139 MMOL/L (ref 134–144)

## 2021-01-25 ENCOUNTER — OFFICE VISIT (OUTPATIENT)
Dept: FAMILY MEDICINE CLINIC | Facility: CLINIC | Age: 46
End: 2021-01-25

## 2021-01-25 VITALS
WEIGHT: 205 LBS | OXYGEN SATURATION: 96 % | SYSTOLIC BLOOD PRESSURE: 107 MMHG | DIASTOLIC BLOOD PRESSURE: 65 MMHG | HEART RATE: 81 BPM | BODY MASS INDEX: 34.16 KG/M2 | HEIGHT: 65 IN | TEMPERATURE: 97.1 F

## 2021-01-25 DIAGNOSIS — E11.9 TYPE 2 DIABETES MELLITUS WITHOUT COMPLICATION, WITHOUT LONG-TERM CURRENT USE OF INSULIN (HCC): Primary | ICD-10-CM

## 2021-01-25 DIAGNOSIS — E66.9 OBESITY (BMI 30-39.9): ICD-10-CM

## 2021-01-25 PROCEDURE — 99214 OFFICE O/P EST MOD 30 MIN: CPT | Performed by: FAMILY MEDICINE

## 2021-01-25 RX ORDER — DULAGLUTIDE 3 MG/.5ML
3 INJECTION, SOLUTION SUBCUTANEOUS WEEKLY
Qty: 12 PEN | Refills: 3 | Status: SHIPPED | OUTPATIENT
Start: 2021-01-25 | End: 2021-06-25

## 2021-01-25 NOTE — PROGRESS NOTES
Chief Complaint   Patient presents with   • Diabetes       Subjective     Maria E Barroso is a 45 y.o. female.   Patient here to follow up on diabetes, states checks sugars occasionally at home typically run 85-95.  Patient states has been taking 2 tablets of Metformin twice a day (for total dose of 2000 mg) and didn't realize till a couple days ago that bottle says to only take 1 tablet twice daily she is also taking Trulicity 1.5 mg weekly.On she will have some fecal urgency and loose stools on Wednesday only for couple of days after taking shot.  She is disappointed that weight loss has plateaued, she is continuing to eat very healthy most of the time and would like to get BMI back at least around 28.  Noticed abdominal obesity started after hysterectomy at age 29.   Diagnosed with diabetes at age 38.     Have had both Covid vaccinations.    The following portions of the patient's history were reviewed and updated as appropriate: allergies, current medications, past family history, past medical history, past social history, past surgical history and problem list.    Current Outpatient Medications on File Prior to Visit   Medication Sig   • atorvastatin (LIPITOR) 40 MG tablet TAKE 1 TABLET BY MOUTH EVERY DAY AT NIGHT   • Blood Glucose Monitoring Suppl (ACCU-CHEK [place] PLUS) w/Device kit 1 kit by Other route Daily.   • glucose blood (ACCU-CHEK [place] PLUS) test strip 1 each by Other route Daily.   • Lancets (ACCU-CHEK SOFT TOUCH) lancets 1 each by Other route Daily.   • metFORMIN (Glucophage) 500 MG tablet Take 1 tablet by mouth 2 (Two) Times a Day With Meals. (Patient taking differently: Take 500 mg by mouth 2 (Two) Times a Day With Meals. Taking 2 tablets twice daily)   • Minoxidil (Rogaine Womens) 5 % foam Apply 1 application topically Daily.   • ondansetron (ZOFRAN) 4 MG tablet TAKE 1 TABLET BY MOUTH EVERY 8 (EIGHT) HOURS AS NEEDED FOR NAUSEA OR VOMITING.   • tiZANidine (ZANAFLEX) 2 MG tablet TAKE 1 TO 2  "TABLETS BY MOUTH AT NIGHT AS NEEDED   • [DISCONTINUED] Dulaglutide (Trulicity) 1.5 MG/0.5ML solution pen-injector Inject 1.5 mg under the skin into the appropriate area as directed 1 (One) Time Per Week.     No current facility-administered medications on file prior to visit.      Medications Discontinued During This Encounter   Medication Reason   • Dulaglutide (Trulicity) 1.5 MG/0.5ML solution pen-injector Dose adjustment       Review of Systems     Objective   Vitals:    01/25/21 1616   BP: 107/65   BP Location: Left arm   Patient Position: Sitting   Cuff Size: Adult   Pulse: 81   Temp: 97.1 °F (36.2 °C)   TempSrc: Infrared   SpO2: 96%   Weight: 93 kg (205 lb)   Height: 165.1 cm (65\")      Body mass index is 34.11 kg/m².    Physical Exam  Vitals signs and nursing note reviewed.   Constitutional:       General: She is not in acute distress.     Appearance: She is well-developed. She is obese. She is not ill-appearing.   HENT:      Head: Normocephalic and atraumatic.   Pulmonary:      Effort: Pulmonary effort is normal.   Musculoskeletal: Normal range of motion.   Skin:     General: Skin is warm and dry.      Findings: No rash.   Neurological:      Mental Status: She is alert and oriented to person, place, and time.   Psychiatric:         Mood and Affect: Mood normal.         Thought Content: Thought content normal.         Lab Results   Component Value Date     (H) 12/28/2020    BUN 13 12/28/2020    CREATININE 0.91 12/28/2020    EGFRIFNONA 75 12/28/2020    EGFRIFAFRI 87 12/28/2020     12/28/2020    K 4.4 12/28/2020     12/28/2020    CALCIUM 10.1 12/28/2020    ALBUMIN 4.7 12/28/2020    BILITOT 0.3 12/28/2020    ALKPHOS 101 12/28/2020    AST 16 12/28/2020    ALT 26 12/28/2020      Lab Results   Component Value Date    HGBA1C 5.9 (H) 12/28/2020    HGBA1C 6.1 (H) 09/29/2020    HGBA1C 6.5 (H) 06/10/2020    HGBA1C 6.4 (H) 03/11/2020        Procedures     Assessment/Plan   Diagnoses and all orders for " this visit:    1. Type 2 diabetes mellitus without complication, without long-term current use of insulin (CMS/[facility]) (Primary)  -     Dulaglutide (Trulicity) 3 MG/0.5ML solution pen-injector; Inject 3 mg under the skin into the appropriate area as directed 1 (One) Time Per Week.  Dispense: 12 pen; Refill: 3  -     Hemoglobin A1c; Future  -     Comprehensive Metabolic Panel; Future    2. Obesity (BMI 30-39.9)    Diabetes at goal however  Increasing trulicity to 3 mg to help faciilitate weigh redution, reduce metformin to 500 mg bid if any glucose readings less than 80 should reduce to only 500 mg daily.    Medications Discontinued During This Encounter   Medication Reason   • Dulaglutide (Trulicity) 1.5 MG/0.5ML solution pen-injector Dose adjustment          Return in about 3 months (around 4/25/2021) for Recheck, diabetes, with Labs.    Education reference material relevant to visit available in AVS through [employer] or printed copy given.

## 2021-02-01 ENCOUNTER — TELEPHONE (OUTPATIENT)
Dept: FAMILY MEDICINE CLINIC | Facility: CLINIC | Age: 46
End: 2021-02-01

## 2021-02-01 NOTE — TELEPHONE ENCOUNTER
Patient called in concern that the Trulicity is going to cost her over $600 to get filled and she is not able to afford that, had patient come by office to get a sample and gave her a savings card to use. Patient contacted back and states the savings card only takes off $150 so she would still be paying like $450 and that is still not affordable, checked with her insurance and it's covered under her plan, but she must meet her $1500 deductible first, any advise on what to do or if medication can be changed to something else?

## 2021-02-01 NOTE — TELEPHONE ENCOUNTER
Unfortunately all of this class of medication is very expensive.  Last A1c was well below goal, could try 3 months without Trulicity instead of making a substitution and continue Metformin at 1000 mg twice daily.  Alternatively we can supply samples as long as we have them or if she finds any of the other GLP-1 agonists such as Bydureon, Byetta, or Victoza or rebylsis have better coverage we could make a change (or if she finds a program that would cover)

## 2021-03-10 ENCOUNTER — TELEPHONE (OUTPATIENT)
Dept: FAMILY MEDICINE CLINIC | Facility: CLINIC | Age: 46
End: 2021-03-10

## 2021-03-10 NOTE — TELEPHONE ENCOUNTER
Last visit her diabetes was well within goal but we increase Trulicity to see if it could help with continued weight reduction.  If she is doing well with the medication and just cannot afford I would like her to check into the [employer] program by visiting [employer] or 1 [phone] to see if she qualifies for drug assistance.  I believe we had reduced her Metformin to 500 mg twice daily from 500 mg 2 twice daily when we made this adjustment.  If she cannot afford Trulicity she needs to find out if she has better coverage for any of the other GLP-1 agonists  such as Bydureon, Ozempic, Rybelsus, or Victoza, or any SGLT2 inhibitors such as Jardiance, Invokana, Farxiga, or DPP 4 inhibitors such as Januvia, Tradjenta, Onglyza are covered.  If nothing in these classes are covered better we will have to stick with Metformin or sulfonylureas i.e. Amaryl on Glucotrol.  We may have some samples of Trulicity I do not think we have the 3 mg dose at this time but we could reduce it back to the 1.5 mg dose until her appointment next month.

## 2021-04-26 ENCOUNTER — OFFICE VISIT (OUTPATIENT)
Dept: FAMILY MEDICINE CLINIC | Facility: CLINIC | Age: 46
End: 2021-04-26

## 2021-04-26 VITALS
OXYGEN SATURATION: 96 % | TEMPERATURE: 97.5 F | HEIGHT: 65 IN | BODY MASS INDEX: 34.66 KG/M2 | WEIGHT: 208 LBS | DIASTOLIC BLOOD PRESSURE: 63 MMHG | HEART RATE: 70 BPM | RESPIRATION RATE: 16 BRPM | SYSTOLIC BLOOD PRESSURE: 113 MMHG

## 2021-04-26 DIAGNOSIS — Z12.11 SCREENING FOR COLON CANCER: ICD-10-CM

## 2021-04-26 DIAGNOSIS — R00.2 PALPITATIONS: ICD-10-CM

## 2021-04-26 DIAGNOSIS — E11.9 TYPE 2 DIABETES MELLITUS WITHOUT COMPLICATION, WITHOUT LONG-TERM CURRENT USE OF INSULIN (HCC): Primary | ICD-10-CM

## 2021-04-26 DIAGNOSIS — E66.9 OBESITY (BMI 30-39.9): ICD-10-CM

## 2021-04-26 DIAGNOSIS — E78.2 MIXED HYPERLIPIDEMIA: ICD-10-CM

## 2021-04-26 PROCEDURE — 99214 OFFICE O/P EST MOD 30 MIN: CPT | Performed by: FAMILY MEDICINE

## 2021-04-26 RX ORDER — LATANOPROST 50 UG/ML
1 SOLUTION/ DROPS OPHTHALMIC
COMMUNITY
Start: 2021-02-19 | End: 2021-08-24

## 2021-05-24 ENCOUNTER — TELEPHONE (OUTPATIENT)
Dept: FAMILY MEDICINE CLINIC | Facility: CLINIC | Age: 46
End: 2021-05-24

## 2021-05-24 NOTE — TELEPHONE ENCOUNTER
HUB TO READ    MY CHART MESSAGE     Please inform patient Cologuard is negative, should repeat in 3 years.

## 2021-06-25 DIAGNOSIS — E11.9 TYPE 2 DIABETES MELLITUS WITHOUT COMPLICATION, WITHOUT LONG-TERM CURRENT USE OF INSULIN (HCC): ICD-10-CM

## 2021-06-25 RX ORDER — DULAGLUTIDE 3 MG/.5ML
3 INJECTION, SOLUTION SUBCUTANEOUS WEEKLY
Qty: 4 PEN | Refills: 3 | Status: SHIPPED | OUTPATIENT
Start: 2021-06-25 | End: 2021-10-13

## 2021-07-20 ENCOUNTER — TELEPHONE (OUTPATIENT)
Dept: FAMILY MEDICINE CLINIC | Facility: CLINIC | Age: 46
End: 2021-07-20

## 2021-07-20 DIAGNOSIS — E11.9 TYPE 2 DIABETES MELLITUS WITHOUT COMPLICATION, WITHOUT LONG-TERM CURRENT USE OF INSULIN (HCC): ICD-10-CM

## 2021-08-15 DIAGNOSIS — E78.01 FAMILIAL HYPERCHOLESTEROLEMIA: ICD-10-CM

## 2021-08-15 DIAGNOSIS — E11.9 TYPE 2 DIABETES MELLITUS WITHOUT COMPLICATION, WITHOUT LONG-TERM CURRENT USE OF INSULIN (HCC): ICD-10-CM

## 2021-08-16 DIAGNOSIS — E11.9 TYPE 2 DIABETES MELLITUS WITHOUT COMPLICATION, WITHOUT LONG-TERM CURRENT USE OF INSULIN (HCC): ICD-10-CM

## 2021-08-16 RX ORDER — ATORVASTATIN CALCIUM 40 MG/1
TABLET, FILM COATED ORAL
Qty: 90 TABLET | Refills: 3 | Status: SHIPPED | OUTPATIENT
Start: 2021-08-16 | End: 2021-08-24 | Stop reason: SDUPTHER

## 2021-08-24 ENCOUNTER — OFFICE VISIT (OUTPATIENT)
Dept: FAMILY MEDICINE CLINIC | Facility: CLINIC | Age: 46
End: 2021-08-24

## 2021-08-24 VITALS
SYSTOLIC BLOOD PRESSURE: 99 MMHG | TEMPERATURE: 98 F | DIASTOLIC BLOOD PRESSURE: 66 MMHG | HEIGHT: 65 IN | HEART RATE: 67 BPM | RESPIRATION RATE: 16 BRPM | BODY MASS INDEX: 34.32 KG/M2 | WEIGHT: 206 LBS | OXYGEN SATURATION: 98 %

## 2021-08-24 DIAGNOSIS — E66.9 OBESITY (BMI 30-39.9): ICD-10-CM

## 2021-08-24 DIAGNOSIS — E11.9 TYPE 2 DIABETES MELLITUS WITHOUT COMPLICATION, WITHOUT LONG-TERM CURRENT USE OF INSULIN (HCC): Primary | ICD-10-CM

## 2021-08-24 DIAGNOSIS — E78.2 MIXED HYPERLIPIDEMIA: ICD-10-CM

## 2021-08-24 PROCEDURE — 99214 OFFICE O/P EST MOD 30 MIN: CPT | Performed by: FAMILY MEDICINE

## 2021-08-24 RX ORDER — ATORVASTATIN CALCIUM 20 MG/1
20 TABLET, FILM COATED ORAL NIGHTLY
Qty: 90 TABLET | Refills: 3
Start: 2021-08-24 | End: 2021-11-30 | Stop reason: SDUPTHER

## 2021-08-24 NOTE — PROGRESS NOTES
Chief Complaint  Diabetes      Subjective          Maria E Barroso presents today for    Diabetes  She presents for her follow-up diabetic visit. She has type 2 diabetes mellitus. Her disease course has been stable. There are no hypoglycemic associated symptoms. Pertinent negatives for hypoglycemia include no confusion, dizziness, headaches, hunger or mood changes. Associated symptoms include fatigue. Pertinent negatives for diabetes include no blurred vision, no chest pain, no foot paresthesias, no foot ulcerations, no polydipsia, no polyphagia, no polyuria, no visual change, no weakness and no weight loss. There are no hypoglycemic complications. Pertinent negatives for hypoglycemia complications include no hospitalization, no required assistance and no required glucagon injection. Symptoms are stable. Risk factors for coronary artery disease include dyslipidemia, diabetes mellitus, obesity, family history and post-menopausal. Current diabetic treatment includes insulin injections and oral agent (monotherapy). She is compliant with treatment all of the time. Her weight is stable. She is following a generally healthy diet. When asked about meal planning, she reported none. She has not had a previous visit with a dietitian. She participates in exercise daily. There is no change in her home blood glucose trend. Her lunch blood glucose is taken between 12-1 pm. Her lunch blood glucose range is generally  mg/dl. Her dinner blood glucose is taken between 7-8 pm. Her dinner blood glucose range is generally 70-90 mg/dl. Her overall blood glucose range is  mg/dl. Eye exam is current.         Current Outpatient Medications on File Prior to Visit   Medication Sig   • Blood Glucose Monitoring Suppl (ACCU-CHEK [place] PLUS) w/Device kit 1 kit by Other route Daily.   • glucose blood (ACCU-CHEK [place] PLUS) test strip 1 each by Other route Daily.   • Lancets (ACCU-CHEK SOFT TOUCH) lancets 1 each by Other route Daily.  "  • metFORMIN (GLUCOPHAGE) 500 MG tablet TAKE 1 TABLET BY MOUTH TWICE A DAY WITH MEALS   • ondansetron (ZOFRAN) 4 MG tablet TAKE 1 TABLET BY MOUTH EVERY 8 (EIGHT) HOURS AS NEEDED FOR NAUSEA OR VOMITING.   • tiZANidine (ZANAFLEX) 2 MG tablet TAKE 1 TO 2 TABLETS BY MOUTH AT NIGHT AS NEEDED   • Trulicity 3 MG/0.5ML solution pen-injector INJECT 3 MG UNDER THE SKIN INTO THE APPROPRIATE AREA AS DIRECTED 1 (ONE) TIME PER WEEK.   • [DISCONTINUED] atorvastatin (LIPITOR) 40 MG tablet TAKE 1 TABLET BY MOUTH EVERY NIGHT   • [DISCONTINUED] atorvastatin (LIPITOR) 40 MG tablet TAKE 1 TABLET BY MOUTH EVERY DAY AT NIGHT   • [DISCONTINUED] latanoprost (XALATAN) 0.005 % ophthalmic solution Administer 1 drop to both eyes every night at bedtime.   • [DISCONTINUED] metFORMIN (Glucophage) 500 MG tablet Take 1 tablet by mouth 2 (Two) Times a Day With Meals. (Patient taking differently: Take 500 mg by mouth 2 (Two) Times a Day With Meals. Taking 2 tablets twice daily)   • [DISCONTINUED] Minoxidil (Rogaine Womens) 5 % foam Apply 1 application topically Daily.     No current facility-administered medications on file prior to visit.       Objective   Vital Signs:   BP 99/66 (BP Location: Left arm, Patient Position: Sitting, Cuff Size: Adult)   Pulse 67   Temp 98 °F (36.7 °C) (Infrared)   Resp 16   Ht 165.1 cm (65\")   Wt 93.4 kg (206 lb)   SpO2 98%   BMI 34.28 kg/m²     Physical Exam  Vitals and nursing note reviewed.   Constitutional:       General: She is not in acute distress.     Appearance: She is well-developed. She is obese. She is not ill-appearing.   HENT:      Head: Normocephalic and atraumatic.   Pulmonary:      Effort: Pulmonary effort is normal.   Musculoskeletal:         General: Normal range of motion.   Skin:     General: Skin is warm and dry.      Findings: No rash.   Neurological:      Mental Status: She is alert and oriented to person, place, and time.   Psychiatric:         Mood and Affect: Mood normal.         " Thought Content: Thought content normal.            No visits with results within 1 Day(s) from this visit.   Latest known visit with results is:   Results Encounter on 07/23/2021   Component Date Value Ref Range Status   • Glucose 08/17/2021 107* 65 - 99 mg/dL Final   • BUN 08/17/2021 12  6 - 24 mg/dL Final   • Creatinine 08/17/2021 0.74  0.57 - 1.00 mg/dL Final   • eGFR Non  Am 08/17/2021 96  >59 mL/min/1.73 Final   • eGFR African Am 08/17/2021 111  >59 mL/min/1.73 Final    Comment: **[facility] currently reports eGFR in compliance with the current**    recommendations of the [employer] Kid[employer] Foundation. [facility] will    update reporting as new guidelines are published from the NKF-ASN    Task force.     • BUN/Creatinine Ratio 08/17/2021 16  9 - 23 Final   • Sodium 08/17/2021 139  134 - 144 mmol/L Final   • Potassium 08/17/2021 4.3  3.5 - 5.2 mmol/L Final   • Chloride 08/17/2021 102  96 - 106 mmol/L Final   • Total CO2 08/17/2021 22  20 - 29 mmol/L Final   • Calcium 08/17/2021 9.8  8.7 - 10.2 mg/dL Final   • Total Protein 08/17/2021 7.4  6.0 - 8.5 g/dL Final   • Albumin 08/17/2021 4.6  3.8 - 4.8 g/dL Final   • Globulin 08/17/2021 2.8  1.5 - 4.5 g/dL Final   • A/G Ratio 08/17/2021 1.6  1.2 - 2.2 Final   • Total Bilirubin 08/17/2021 0.4  0.0 - 1.2 mg/dL Final   • Alkaline Phosphatase 08/17/2021 94  48 - 121 IU/L Final   • AST (SGOT) 08/17/2021 12  0 - 40 IU/L Final   • ALT (SGPT) 08/17/2021 19  0 - 32 IU/L Final   • Hemoglobin A1C 08/17/2021 6.0* 4.8 - 5.6 % Final    Comment:          Prediabetes: 5.7 - 6.4           Diabetes: >6.4           Glycemic control for adults with diabetes: <7.0     • Free T4 08/17/2021 1.37  0.82 - 1.77 ng/dL Final   • TSH 08/17/2021 1.310  0.450 - 4.500 uIU/mL Final   • T3, Free 08/17/2021 2.6  2.0 - 4.4 pg/mL Final   • Total Cholesterol 08/17/2021 106  100 - 199 mg/dL Final   • Triglycerides 08/17/2021 156* 0 - 149 mg/dL Final   • HDL Cholesterol 08/17/2021 28* >39 mg/dL Final   •  VLDL Cholesterol Myles 08/17/2021 27  5 - 40 mg/dL Final   • LDL Chol Calc (NIH) 08/17/2021 51  0 - 99 mg/dL Final   • Microalbumin, Urine 08/17/2021 9.6  Not Estab. ug/mL Final       Lab Results   Component Value Date     (H) 08/17/2021    BUN 12 08/17/2021    CREATININE 0.74 08/17/2021    EGFRIFNONA 96 08/17/2021    EGFRIFAFRI 111 08/17/2021     08/17/2021    K 4.3 08/17/2021     08/17/2021    CALCIUM 9.8 08/17/2021    ALBUMIN 4.6 08/17/2021    BILITOT 0.4 08/17/2021    ALKPHOS 94 08/17/2021    AST 12 08/17/2021    ALT 19 08/17/2021    CHLPL 106 08/17/2021    TRIG 156 (H) 08/17/2021    HDL 28 (L) 08/17/2021    VLDL 27 08/17/2021    LDL 51 08/17/2021    MICROALBUR 9.6 08/17/2021    TSH 1.310 08/17/2021    FREET4 1.37 08/17/2021        Lab Results   Component Value Date    HGBA1C 6.0 (H) 08/17/2021    HGBA1C 6.2 (H) 04/23/2021    HGBA1C 5.9 (H) 12/28/2020    HGBA1C 6.1 (H) 09/29/2020                Assessment and Plan    Diagnoses and all orders for this visit:    1. Type 2 diabetes mellitus without complication, without long-term current use of insulin (CMS/[facility]) (Primary)  -     atorvastatin (LIPITOR) 20 MG tablet; Take 1 tablet by mouth Every Night.  Dispense: 90 tablet; Refill: 3  -     Hemoglobin A1c; Future  -     Comprehensive Metabolic Panel; Future  -     Lipid Panel; Future    2. Obesity (BMI 30-39.9)    3. Mixed hyperlipidemia  -     atorvastatin (LIPITOR) 20 MG tablet; Take 1 tablet by mouth Every Night.  Dispense: 90 tablet; Refill: 3  -     Comprehensive Metabolic Panel; Future  -     Lipid Panel; Future        Diabets great control, continue Trulicity and low-dose Metformin.  Did discuss continued efforts at weight reduction.  Not going to start ACE at this time due to persistantly low blood pressure.   palpitations have resolved.   Hyperlipidemia, cholesterol actually well below goal we will reduce atorvastatin from 40 to 20 mg.    Medications Discontinued During This Encounter    Medication Reason   • Minoxidil (Rogaine Womens) 5 % foam *Therapy completed   • latanoprost (XALATAN) 0.005 % ophthalmic solution *Therapy completed   • atorvastatin (LIPITOR) 40 MG tablet Reorder         Follow Up     Return in about 3 months (around 11/24/2021) for Recheck, with Labs, diabetes, cholesterol.    Patient was given instructions and counseling regarding her condition or for health maintenance advice. Please see specific information pulled into the AVS if appropriate.

## 2021-08-27 ENCOUNTER — TELEPHONE (OUTPATIENT)
Dept: FAMILY MEDICINE CLINIC | Facility: CLINIC | Age: 46
End: 2021-08-27

## 2021-08-27 NOTE — TELEPHONE ENCOUNTER
Caller: Maria E Barroso    Relationship to patient: Self    Best call back number: [phone]    Patient is needing: PATIENT WANTS TO NOW IF ITS SAFE FOR HER TO TAKE PRENATAL VITAMINS FOR HER HAIR LOSS. PLEASE CALL PATIENT BACK OR MESSAGE ON [employer]

## 2021-10-11 DIAGNOSIS — E11.9 TYPE 2 DIABETES MELLITUS WITHOUT COMPLICATION, WITHOUT LONG-TERM CURRENT USE OF INSULIN (HCC): ICD-10-CM

## 2021-10-13 RX ORDER — DULAGLUTIDE 3 MG/.5ML
3 INJECTION, SOLUTION SUBCUTANEOUS WEEKLY
Qty: 12 PEN | Refills: 3 | Status: SHIPPED | OUTPATIENT
Start: 2021-10-13 | End: 2022-02-23 | Stop reason: DRUGHIGH

## 2021-11-30 ENCOUNTER — OFFICE VISIT (OUTPATIENT)
Dept: FAMILY MEDICINE CLINIC | Facility: CLINIC | Age: 46
End: 2021-11-30

## 2021-11-30 VITALS
BODY MASS INDEX: 34.66 KG/M2 | DIASTOLIC BLOOD PRESSURE: 60 MMHG | HEART RATE: 97 BPM | HEIGHT: 65 IN | WEIGHT: 208 LBS | SYSTOLIC BLOOD PRESSURE: 110 MMHG | OXYGEN SATURATION: 94 % | RESPIRATION RATE: 18 BRPM | TEMPERATURE: 97.1 F

## 2021-11-30 DIAGNOSIS — E66.9 OBESITY (BMI 30-39.9): ICD-10-CM

## 2021-11-30 DIAGNOSIS — E11.9 TYPE 2 DIABETES MELLITUS WITHOUT COMPLICATION, WITHOUT LONG-TERM CURRENT USE OF INSULIN (HCC): Primary | ICD-10-CM

## 2021-11-30 DIAGNOSIS — E78.2 MIXED HYPERLIPIDEMIA: ICD-10-CM

## 2021-11-30 DIAGNOSIS — Z12.31 ENCOUNTER FOR SCREENING MAMMOGRAM FOR BREAST CANCER: ICD-10-CM

## 2021-11-30 PROCEDURE — 99214 OFFICE O/P EST MOD 30 MIN: CPT | Performed by: FAMILY MEDICINE

## 2021-11-30 RX ORDER — ATORVASTATIN CALCIUM 40 MG/1
TABLET, FILM COATED ORAL
COMMUNITY
Start: 2021-11-15 | End: 2022-08-17 | Stop reason: SDUPTHER

## 2021-12-10 ENCOUNTER — HOSPITAL ENCOUNTER (OUTPATIENT)
Dept: MAMMOGRAPHY | Facility: HOSPITAL | Age: 46
Discharge: HOME | End: 2021-12-10
Admitting: FAMILY MEDICINE

## 2021-12-10 DIAGNOSIS — Z12.31 ENCOUNTER FOR SCREENING MAMMOGRAM FOR BREAST CANCER: ICD-10-CM

## 2021-12-10 PROCEDURE — 77063 BREAST TOMOSYNTHESIS BI: CPT

## 2021-12-10 PROCEDURE — 77067 SCR MAMMO BI INCL CAD: CPT

## 2022-02-16 ENCOUNTER — TELEPHONE (OUTPATIENT)
Dept: FAMILY MEDICINE CLINIC | Facility: CLINIC | Age: 47
End: 2022-02-16

## 2022-02-16 NOTE — TELEPHONE ENCOUNTER
----- Message from Cyndi Vasquez MD sent at 2/16/2022  1:01 PM EST -----  Maria E,Incredible improvement on cholesterol since restarting atorvastatin.There is also improvement in diabetes control.  Continue current medications and keep up the great work.  Can discuss further at appointment scheduled on February 23.Cyndi Vasquez MD

## 2022-02-23 ENCOUNTER — OFFICE VISIT (OUTPATIENT)
Dept: FAMILY MEDICINE CLINIC | Facility: CLINIC | Age: 47
End: 2022-02-23

## 2022-02-23 VITALS
TEMPERATURE: 97.1 F | SYSTOLIC BLOOD PRESSURE: 125 MMHG | WEIGHT: 207.6 LBS | BODY MASS INDEX: 34.59 KG/M2 | DIASTOLIC BLOOD PRESSURE: 70 MMHG | OXYGEN SATURATION: 98 % | HEART RATE: 93 BPM | HEIGHT: 65 IN | RESPIRATION RATE: 18 BRPM

## 2022-02-23 DIAGNOSIS — E66.9 OBESITY (BMI 30-39.9): ICD-10-CM

## 2022-02-23 DIAGNOSIS — E11.9 TYPE 2 DIABETES MELLITUS WITHOUT COMPLICATION, WITHOUT LONG-TERM CURRENT USE OF INSULIN: Primary | ICD-10-CM

## 2022-02-23 DIAGNOSIS — E78.2 MIXED HYPERLIPIDEMIA: ICD-10-CM

## 2022-02-23 PROCEDURE — 99214 OFFICE O/P EST MOD 30 MIN: CPT | Performed by: FAMILY MEDICINE

## 2022-02-23 RX ORDER — DULAGLUTIDE 4.5 MG/.5ML
4.5 INJECTION, SOLUTION SUBCUTANEOUS WEEKLY
Qty: 12 PEN | Refills: 3 | Status: SHIPPED | OUTPATIENT
Start: 2022-02-23 | End: 2023-01-12

## 2022-02-23 NOTE — PATIENT INSTRUCTIONS
"[email or web address]/docs/default-source/living-with-diabetes/conquering-the-grocery-store-v1.pdf?[email or web address]\">   Carbohydrate Counting for Diabetes Mellitus, Adult  Carbohydrate counting is a method of keeping track of how many carbohydrates you eat. Eating carbohydrates naturally increases the amount of sugar (glucose) in the blood. Counting how many carbohydrates you eat improves your blood glucose control, which helps you manage your diabetes.  It is important to know how many carbohydrates you can safely have in each meal. This is different for every person. A dietitian can help you make a meal plan and calculate how many carbohydrates you should have at each meal and snack.  What foods contain carbohydrates?  Carbohydrates are found in the following foods:  · Grains, such as breads and cereals.  · Dried beans and soy products.  · Starchy vegetables, such as potatoes, peas, and corn.  · Fruit and fruit juices.  · Milk and yogurt.  · Sweets and snack foods, such as cake, cookies, candy, chips, and soft drinks.  How do I count carbohydrates in foods?  There are two ways to count carbohydrates in food. You can read food labels or learn standard serving sizes of foods. You can use either of the methods or a combination of both.  Using the Nutrition Facts label  The Nutrition Facts list is included on the labels of almost all packaged foods and beverages in the [place] It includes:  · The serving size.  · Information about nutrients in each serving, including the grams (g) of carbohydrate per serving.  To use the Nutrition Facts:  · Decide how many servings you will have.  · Multiply the number of servings by the number of carbohydrates per serving.  · The resulting number is the total amount of carbohydrates that you will be having.  Learning the standard serving sizes of foods  When you eat carbohydrate foods that are not packaged or do not include Nutrition Facts on the label, you need to measure the " servings in order to count the amount of carbohydrates.  · Measure the foods that you will eat with a food scale or measuring cup, if needed.  · Decide how many standard-size servings you will eat.  · Multiply the number of servings by 15. For foods that contain carbohydrates, one serving equals 15 g of carbohydrates.  ? For example, if you eat 2 cups or 10 oz (300 g) of strawberries, you will have eaten 2 servings and 30 g of carbohydrates (2 servings x 15 g = 30 g).  · For foods that have more than one food mixed, such as soups and casseroles, you must count the carbohydrates in each food that is included.  The following list contains standard serving sizes of common carbohydrate-rich foods. Each of these servings has about 15 g of carbohydrates:  · 1 slice of bread.  · 1 six-inch (15 cm) tortilla.  · ? cup or 2 oz (53 g) cooked rice or pasta.  · ½ cup or 3 oz (85 g) cooked or canned, drained and rinsed beans or lentils.  · ½ cup or 3 oz (85 g) starchy vegetable, such as peas, corn, or squash.  · ½ cup or 4 oz (120 g) hot cereal.  · ½ cup or 3 oz (85 g) boiled or mashed potatoes, or ¼ or 3 oz (85 g) of a large baked potato.  · ½ cup or 4 fl oz (118 mL) fruit juice.  · 1 cup or 8 fl oz (237 mL) milk.  · 1 small or 4 oz (106 g) apple.  · ½ or 2 oz (63 g) of a medium banana.  · 1 cup or 5 oz (150 g) strawberries.  · 3 cups or 1 oz (24 g) popped popcorn.  What is an example of carbohydrate counting?  To calculate the number of carbohydrates in this sample meal, follow the steps shown below.  Sample meal  · 3 oz (85 g) chicken breast.  · ? cup or 4 oz (106 g) brown rice.  · ½ cup or 3 oz (85 g) corn.  · 1 cup or 8 fl oz (237 mL) milk.  · 1 cup or 5 oz (150 g) strawberries with sugar-free whipped topping.  Carbohydrate calculation  1. Identify the foods that contain carbohydrates:  ? Rice.  ? Corn.  ? Milk.  ? Strawberries.  2. Calculate how many servings you have of each food:  ? 2 servings rice.  ? 1 serving  corn.  ? 1 serving milk.  ? 1 serving strawberries.  3. Multiply each number of servings by 15 g:  ? 2 servings rice x 15 g = 30 g.  ? 1 serving corn x 15 g = 15 g.  ? 1 serving milk x 15 g = 15 g.  ? 1 serving strawberries x 15 g = 15 g.  4. Add together all of the amounts to find the total grams of carbohydrates eaten:  ? 30 g + 15 g + 15 g + 15 g = 75 g of carbohydrates total.  What are tips for following this plan?  Shopping  · Develop a meal plan and then make a shopping list.  · Buy fresh and frozen vegetables, fresh and frozen fruit, dairy, eggs, beans, lentils, and whole grains.  · Look at food labels. Choose foods that have more fiber and less sugar.  · Avoid processed foods and foods with added sugars.  Meal planning  · Aim to have the same amount of carbohydrates at each meal and for each snack time.  · Plan to have regular, balanced meals and snacks.  Where to find more information  · American Diabetes Association: www.diabetes.org  · Centers for Disease Control and Prevention: www.cdc.gov  Summary  · Carbohydrate counting is a method of keeping track of how many carbohydrates you eat.  · Eating carbohydrates naturally increases the amount of sugar (glucose) in the blood.  · Counting how many carbohydrates you eat improves your blood glucose control, which helps you manage your diabetes.  · A dietitian can help you make a meal plan and calculate how many carbohydrates you should have at each meal and snack.  This information is not intended to replace advice given to you by your health care provider. Make sure you discuss any questions you have with your health care provider.  Document Revised: 2020 Document Reviewed: 2020  [employer] Patient Education ©  [employer]  Weight Control  You will be given some ideas to help you manage your weight.  To view the content, go to this web address:  [email or web address]    This video will  on: 2023. If you need access to this  video following this date, please reach out to the healthcare provider who assigned it to you.  This information is not intended to replace advice given to you by your health care provider. Make sure you discuss any questions you have with your health care provider.  [employer]’s editorial and clinical teams regularly review and update content to ensure it is up-to-date with changing practice standards and recognized medical guidelines.  Document Revised: 2021  [employer] Patient Education ©  [employer]  Diabetes and Weight Control  In this video, you will learn weight management techniques including reading food labels, calorie and carbohydrate counting, portion sizes, and the importance of hydration and exercise.  To view the content, go to this web address:  [email or web address]    This video will  on: 2023. If you need access to this video following this date, please reach out to the healthcare provider who assigned it to you.  This information is not intended to replace advice given to you by your health care provider. Make sure you discuss any questions you have with your health care provider.  [employer]’s editorial and clinical teams regularly review and update content to ensure it is up-to-date with changing practice standards and recognized medical guidelines.  Document Revised: 2021  [employer] Patient Education ©  [employer]    Calorie Counting for Weight Loss  Calories are units of energy. Your body needs a certain number of calories from food to keep going throughout the day. When you eat or drink more calories than your body needs, your body stores the extra calories mostly as fat. When you eat or drink fewer calories than your body needs, your body burns fat to get the energy it needs.  Calorie counting means keeping track of how many calories you eat and drink each day. Calorie counting can be helpful if you need to lose weight. If you eat fewer calories than  your body needs, you should lose weight. Ask your health care provider what a healthy weight is for you.  For calorie counting to work, you will need to eat the right number of calories each day to lose a healthy amount of weight per week. A dietitian can help you figure out how many calories you need in a day and will suggest ways to reach your calorie goal.  · A healthy amount of weight to lose each week is usually 1-2 lb (0.5-0.9 kg). This usually means that your daily calorie intake should be reduced by 500-750 calories.  · Eating 1,200-1,500 calories a day can help most women lose weight.  · Eating 1,500-1,800 calories a day can help most men lose weight.  What do I need to know about calorie counting?  Work with your health care provider or dietitian to determine how many calories you should get each day. To meet your daily calorie goal, you will need to:  · Find out how many calories are in each food that you would like to eat. Try to do this before you eat.  · Decide how much of the food you plan to eat.  · Keep a food log. Do this by writing down what you ate and how many calories it had.  To successfully lose weight, it is important to balance calorie counting with a healthy lifestyle that includes regular activity.  Where do I find calorie information?    The number of calories in a food can be found on a Nutrition Facts label. If a food does not have a Nutrition Facts label, try to look up the calories online or ask your dietitian for help.  Remember that calories are listed per serving. If you choose to have more than one serving of a food, you will have to multiply the calories per serving by the number of servings you plan to eat. For example, the label on a package of bread might say that a serving size is 1 slice and that there are 90 calories in a serving. If you eat 1 slice, you will have eaten 90 calories. If you eat 2 slices, you will have eaten 180 calories.  How do I keep a food log?  After  each time that you eat, record the following in your food log as soon as possible:  · What you ate. Be sure to include toppings, sauces, and other extras on the food.  · How much you ate. This can be measured in cups, ounces, or number of items.  · How many calories were in each food and drink.  · The total number of calories in the food you ate.  Keep your food log near you, such as in a pocket-sized notebook or on an cara or website on your mobile phone. Some programs will calculate calories for you and show you how many calories you have left to meet your daily goal.  What are some portion-control tips?  · Know how many calories are in a serving. This will help you know how many servings you can have of a certain food.  · Use a measuring cup to measure serving sizes. You could also try weighing out portions on a kitchen scale. With time, you will be able to estimate serving sizes for some foods.  · Take time to put servings of different foods on your favorite plates or in your favorite bowls and cups so you know what a serving looks like.  · Try not to eat straight from a food's packaging, such as from a bag or box. Eating straight from the package makes it hard to see how much you are eating and can lead to overeating. Put the amount you would like to eat in a cup or on a plate to make sure you are eating the right portion.  · Use smaller plates, glasses, and bowls for smaller portions and to prevent overeating.  · Try not to multitask. For example, avoid watching TV or using your computer while eating. If it is time to eat, sit down at a table and enjoy your food. This will help you recognize when you are full. It will also help you be more mindful of what and how much you are eating.  What are tips for following this plan?  Reading food labels  · Check the calorie count compared with the serving size. The serving size may be smaller than what you are used to eating.  · Check the source of the calories. Try to  choose foods that are high in protein, fiber, and vitamins, and low in saturated fat, trans fat, and sodium.  Shopping  · Read nutrition labels while you shop. This will help you make healthy decisions about which foods to buy.  · Pay attention to nutrition labels for low-fat or fat-free foods. These foods sometimes have the same number of calories or more calories than the full-fat versions. They also often have added sugar, starch, or salt to make up for flavor that was removed with the fat.  · Make a grocery list of lower-calorie foods and stick to it.  Cooking  · Try to cook your favorite foods in a healthier way. For example, try baking instead of frying.  · Use low-fat dairy products.  Meal planning  · Use more fruits and vegetables. One-half of your plate should be fruits and vegetables.  · Include lean proteins, such as chicken, turkey, and fish.  Lifestyle  Each week, aim to do one of the following:  · 150 minutes of moderate exercise, such as walking.  · 75 minutes of vigorous exercise, such as running.  General information  · Know how many calories are in the foods you eat most often. This will help you calculate calorie counts faster.  · Find a way of tracking calories that works for you. Get creative. Try different apps or programs if writing down calories does not work for you.  What foods should I eat?    · Eat nutritious foods. It is better to have a nutritious, high-calorie food, such as an avocado, than a food with few nutrients, such as a bag of potato chips.  · Use your calories on foods and drinks that will fill you up and will not leave you hungry soon after eating.  ? Examples of foods that fill you up are nuts and nut butters, vegetables, lean proteins, and high-fiber foods such as whole grains. High-fiber foods are foods with more than 5 g of fiber per serving.  · Pay attention to calories in drinks. Low-calorie drinks include water and unsweetened drinks.  The items listed above may not be  a complete list of foods and beverages you can eat. Contact a dietitian for more information.  What foods should I limit?  Limit foods or drinks that are not good sources of vitamins, minerals, or protein or that are high in unhealthy fats. These include:  · Candy.  · Other sweets.  · Sodas, specialty coffee drinks, alcohol, and juice.  The items listed above may not be a complete list of foods and beverages you should avoid. Contact a dietitian for more information.  How do I count calories when eating out?  · Pay attention to portions. Often, portions are much larger when eating out. Try these tips to keep portions smaller:  ? Consider sharing a meal instead of getting your own.  ? If you get your own meal, eat only half of it. Before you start eating, ask for a container and put half of your meal into it.  ? When available, consider ordering smaller portions from the menu instead of full portions.  · Pay attention to your food and drink choices. Knowing the way food is cooked and what is included with the meal can help you eat fewer calories.  ? If calories are listed on the menu, choose the lower-calorie options.  ? Choose dishes that include vegetables, fruits, whole grains, low-fat dairy products, and lean proteins.  ? Choose items that are boiled, broiled, grilled, or steamed. Avoid items that are buttered, battered, fried, or served with cream sauce. Items labeled as crispy are usually fried, unless stated otherwise.  ? Choose water, low-fat milk, unsweetened iced tea, or other drinks without added sugar. If you want an alcoholic beverage, choose a lower-calorie option, such as a glass of wine or light beer.  ? Ask for dressings, sauces, and syrups on the side. These are usually high in calories, so you should limit the amount you eat.  ? If you want a salad, choose a garden salad and ask for grilled meats. Avoid extra toppings such as linn, cheese, or fried items. Ask for the dressing on the side, or ask  for olive oil and vinegar or lemon to use as dressing.  · Estimate how many servings of a food you are given. Knowing serving sizes will help you be aware of how much food you are eating at restaurants.  Where to find more information  · Centers for Disease Control and Prevention: www.cdc.gov  · [place] Department of Agriculture: myplate.gov  Summary  · Calorie counting means keeping track of how many calories you eat and drink each day. If you eat fewer calories than your body needs, you should lose weight.  · A healthy amount of weight to lose per week is usually 1-2 lb (0.5-0.9 kg). This usually means reducing your daily calorie intake by 500-750 calories.  · The number of calories in a food can be found on a Nutrition Facts label. If a food does not have a Nutrition Facts label, try to look up the calories online or ask your dietitian for help.  · Use smaller plates, glasses, and bowls for smaller portions and to prevent overeating.  · Use your calories on foods and drinks that will fill you up and not leave you hungry shortly after a meal.  This information is not intended to replace advice given to you by your health care provider. Make sure you discuss any questions you have with your health care provider.  Document Revised: 01/28/2021 Document Reviewed: 01/28/2021  [employer] Patient Education © 2021 [employer]    Exercising to Lose Weight  Exercise is structured, repetitive physical activity to improve fitness and health. Getting regular exercise is important for everyone. It is especially important if you are overweight. Being overweight increases your risk of heart disease, stroke, diabetes, high blood pressure, and several types of cancer. Reducing your calorie intake and exercising can help you lose weight.  Exercise is usually categorized as moderate or vigorous intensity. To lose weight, most people need to do a certain amount of moderate-intensity or vigorous-intensity exercise each  week.  Moderate-intensity exercise    Moderate-intensity exercise is any activity that gets you moving enough to burn at least three times more energy (calories) than if you were sitting.  Examples of moderate exercise include:  · Walking a mile in 15 minutes.  · Doing light yard work.  · Biking at an easy pace.  Most people should get at least 150 minutes (2 hours and 30 minutes) a week of moderate-intensity exercise to maintain their body weight.  Vigorous-intensity exercise  Vigorous-intensity exercise is any activity that gets you moving enough to burn at least six times more calories than if you were sitting. When you exercise at this intensity, you should be working hard enough that you are not able to carry on a conversation.  Examples of vigorous exercise include:  · Running.  · Playing a team sport, such as football, basketball, and soccer.  · Jumping rope.  Most people should get at least 75 minutes (1 hour and 15 minutes) a week of vigorous-intensity exercise to maintain their body weight.  How can exercise affect me?  When you exercise enough to burn more calories than you eat, you lose weight. Exercise also reduces body fat and builds muscle. The more muscle you have, the more calories you burn. Exercise also:  · Improves mood.  · Reduces stress and tension.  · Improves your overall fitness, flexibility, and endurance.  · Increases bone strength.  The amount of exercise you need to lose weight depends on:  · Your age.  · The type of exercise.  · Any health conditions you have.  · Your overall physical ability.  Talk to your health care provider about how much exercise you need and what types of activities are safe for you.  What actions can I take to lose weight?  Nutrition    · Make changes to your diet as told by your health care provider or diet and nutrition specialist (dietitian). This may include:  ? Eating fewer calories.  ? Eating more protein.  ? Eating less unhealthy fats.  ? Eating a diet  that includes fresh fruits and vegetables, whole grains, low-fat dairy products, and lean protein.  ? Avoiding foods with added fat, salt, and sugar.  · Drink plenty of water while you exercise to prevent dehydration or heat stroke.    Activity  · Choose an activity that you enjoy and set realistic goals. Your health care provider can help you make an exercise plan that works for you.  · Exercise at a moderate or vigorous intensity most days of the week.  ? The intensity of exercise may vary from person to person. You can tell how intense a workout is for you by paying attention to your breathing and heartbeat. Most people will notice their breathing and heartbeat get faster with more intense exercise.  · Do resistance training twice each week, such as:  ? Push-ups.  ? Sit-ups.  ? Lifting weights.  ? Using resistance bands.  · Getting short amounts of exercise can be just as helpful as long structured periods of exercise. If you have trouble finding time to exercise, try to include exercise in your daily routine.  ? Get up, stretch, and walk around every 30 minutes throughout the day.  ? Go for a walk during your lunch break.  ? Park your car farther away from your destination.  ? If you take public transportation, get off one stop early and walk the rest of the way.  ? Make phone calls while standing up and walking around.  ? Take the stairs instead of elevators or escalators.  · Wear comfortable clothes and shoes with good support.  · Do not exercise so much that you hurt yourself, feel dizzy, or get very short of breath.  Where to find more information  · [place] Department of Health and Human Services: www.hhs.gov  · Centers for Disease Control and Prevention (CDC): www.cdc.gov  Contact a health care provider:  · Before starting a new exercise program.  · If you have questions or concerns about your weight.  · If you have a medical problem that keeps you from exercising.  Get help right away if you have any of the  following while exercising:  · Injury.  · Dizziness.  · Difficulty breathing or shortness of breath that does not go away when you stop exercising.  · Chest pain.  · Rapid heartbeat.  Summary  · Being overweight increases your risk of heart disease, stroke, diabetes, high blood pressure, and several types of cancer.  · Losing weight happens when you burn more calories than you eat.  · Reducing the amount of calories you eat in addition to getting regular moderate or vigorous exercise each week helps you lose weight.  This information is not intended to replace advice given to you by your health care provider. Make sure you discuss any questions you have with your health care provider.  Document Revised: 04/15/2021 Document Reviewed: 04/15/2021  [employer] Patient Education © 2021 [employer]

## 2022-02-23 NOTE — PROGRESS NOTES
"Chief Complaint  Diabetes, Obesity, and Hyperlipidemia     History of Present Illness  Maria E Barroso presents today for a diabetic follow. Patient states that she checks her sugars but not very often. Other wise patient states that she is doing well with her sugars. Patient also would like to talk about her weight. Patient states that she is on the go a lot and is not eating much.and is still not losing any weight.     Did have Pfizer covid vaccination booster In January at [facility].    Current Outpatient Medications on File Prior to Visit   Medication Sig   • atorvastatin (LIPITOR) 40 MG tablet    • Blood Glucose Monitoring Suppl (ACCU-CHEK [place] PLUS) w/Device kit 1 kit by Other route Daily.   • glucose blood (ACCU-CHEK [place] PLUS) test strip 1 each by Other route Daily.   • Lancets (ACCU-CHEK SOFT TOUCH) lancets 1 each by Other route Daily.   • tiZANidine (ZANAFLEX) 2 MG tablet TAKE 1 TO 2 TABLETS BY MOUTH AT NIGHT AS NEEDED   • [DISCONTINUED] Dulaglutide (Trulicity) 3 MG/0.5ML solution pen-injector Inject 3 mg under the skin into the appropriate area as directed 1 (One) Time Per Week.   • [DISCONTINUED] metFORMIN (GLUCOPHAGE) 500 MG tablet TAKE 1 TABLET BY MOUTH TWICE A DAY WITH MEALS   • [DISCONTINUED] Prenatal Vit-Fe Fumarate-FA ([facility] PRENATAL PO) Take  by mouth.   • [DISCONTINUED] ondansetron (ZOFRAN) 4 MG tablet TAKE 1 TABLET BY MOUTH EVERY 8 (EIGHT) HOURS AS NEEDED FOR NAUSEA OR VOMITING.     No current facility-administered medications on file prior to visit.       Objective   Vital Signs:   /70   Pulse 93   Temp 97.1 °F (36.2 °C) (Temporal)   Resp 18   Ht 165.1 cm (65\")   Wt 94.2 kg (207 lb 9.6 oz)   SpO2 98%   BMI 34.55 kg/m²       Physical Exam  Vitals and nursing note reviewed.   Constitutional:       General: She is not in acute distress.     Appearance: She is well-developed.   HENT:      Head: Normocephalic and atraumatic.   Cardiovascular:      Rate and Rhythm: Normal rate and " regular rhythm.      Heart sounds: No murmur heard.      Pulmonary:      Effort: Pulmonary effort is normal.      Breath sounds: Normal breath sounds. No wheezing.   Musculoskeletal:         General: Normal range of motion.   Skin:     General: Skin is warm and dry.      Findings: No rash.   Neurological:      Mental Status: She is alert and oriented to person, place, and time.   Psychiatric:         Mood and Affect: Mood normal.         Behavior: Behavior normal.         Thought Content: Thought content normal.         Judgment: Judgment normal.            No visits with results within 1 Day(s) from this visit.   Latest known visit with results is:   Results Encounter on 02/04/2022   Component Date Value Ref Range Status   • Hemoglobin A1C 02/14/2022 5.9* 4.8 - 5.6 % Final    Comment:          Prediabetes: 5.7 - 6.4           Diabetes: >6.4           Glycemic control for adults with diabetes: <7.0     • Glucose 02/14/2022 88  65 - 99 mg/dL Final   • BUN 02/14/2022 7  6 - 24 mg/dL Final   • Creatinine 02/14/2022 0.64  0.57 - 1.00 mg/dL Final   • eGFR Non  Am 02/14/2022 106  >59 mL/min/1.73 Final   • eGFR African Am 02/14/2022 122  >59 mL/min/1.73 Final    Comment: **In accordance with recommendations from the NKF-ASN Task force,**    [facility] the [facility] eGFR calculation to the    2021 CKD-EPI creatinine equation that estimates kidney function    without a race variable.     • BUN/Creatinine Ratio 02/14/2022 11  9 - 23 Final   • Sodium 02/14/2022 140  134 - 144 mmol/L Final   • Potassium 02/14/2022 4.0  3.5 - 5.2 mmol/L Final   • Chloride 02/14/2022 103  96 - 106 mmol/L Final   • Total CO2 02/14/2022 22  20 - 29 mmol/L Final   • Calcium 02/14/2022 9.6  8.7 - 10.2 mg/dL Final   • Total Protein 02/14/2022 7.3  6.0 - 8.5 g/dL Final   • Albumin 02/14/2022 4.7  3.8 - 4.8 g/dL Final   • Globulin 02/14/2022 2.6  1.5 - 4.5 g/dL Final   • A/G Ratio 02/14/2022 1.8  1.2 - 2.2 Final   • Total  Bilirubin 02/14/2022 0.4  0.0 - 1.2 mg/dL Final   • Alkaline Phosphatase 02/14/2022 90  44 - 121 IU/L Final   • AST (SGOT) 02/14/2022 23  0 - 40 IU/L Final   • ALT (SGPT) 02/14/2022 35* 0 - 32 IU/L Final   • Total Cholesterol 02/14/2022 122  100 - 199 mg/dL Final   • Triglycerides 02/14/2022 121  0 - 149 mg/dL Final   • HDL Cholesterol 02/14/2022 34* >39 mg/dL Final   • VLDL Cholesterol Myles 02/14/2022 22  5 - 40 mg/dL Final   • LDL Chol Calc (NIH) 02/14/2022 66  0 - 99 mg/dL Final       Lab Results   Component Value Date    BUN 7 02/14/2022    CREATININE 0.64 02/14/2022    EGFRIFNONA 106 02/14/2022    EGFRIFAFRI 122 02/14/2022     02/14/2022    K 4.0 02/14/2022     02/14/2022    CALCIUM 9.6 02/14/2022    ALBUMIN 4.7 02/14/2022    BILITOT 0.4 02/14/2022    ALKPHOS 90 02/14/2022    AST 23 02/14/2022    ALT 35 (H) 02/14/2022    CHLPL 122 02/14/2022    TRIG 121 02/14/2022    HDL 34 (L) 02/14/2022    VLDL 22 02/14/2022    LDL 66 02/14/2022        Lab Results   Component Value Date    HGBA1C 5.9 (H) 02/14/2022    HGBA1C 6.3 (H) 11/23/2021    HGBA1C 6.0 (H) 08/17/2021    HGBA1C 6.2 (H) 04/23/2021                Assessment and Plan    Diagnoses and all orders for this visit:    1. Type 2 diabetes mellitus without complication, without long-term current use of insulin (HCC) (Primary)  -     Dulaglutide (Trulicity) 4.5 MG/0.5ML solution pen-injector; Inject 4.5 mg under the skin into the appropriate area as directed 1 (One) Time Per Week.  Dispense: 12 pen; Refill: 3  -     metFORMIN (GLUCOPHAGE) 500 MG tablet; Take 1 tablet by mouth Daily With Breakfast.  Dispense: 90 tablet; Refill: 3    2. Mixed hyperlipidemia    3. Obesity (BMI 30-39.9)      Diabetes, excellent control however struggling with weight loss despite low calories. Trulicity initially had appetite suppression which has not persisted. We will increase Trulicity to a max dose of 4.5 mg, reduce Metformin to 500 mg once daily recheck A1c in 3 months,  encouraged her to increase physical activity. Mentioned other programs such as HMR that can help with weight loss.    Hyperlipidemia, excellent control, offered to reduce Lipitor from 40 to 20 mg as had been discussed previously, patient states she is okay continuing 40 mg.    Medications Discontinued During This Encounter   Medication Reason   • ondansetron (ZOFRAN) 4 MG tablet *Therapy completed   • Prenatal Vit-Fe Fumarate-FA ([facility] PRENATAL PO) *Therapy completed   • Dulaglutide (Trulicity) 3 MG/0.5ML solution pen-injector Dose adjustment   • metFORMIN (GLUCOPHAGE) 500 MG tablet Reorder         Follow Up     Return in about 3 months (around 5/23/2022) for Annual physical follow up diabetes and foot exam.    Patient was given instructions and counseling regarding her condition or for health maintenance advice. Please see specific information pulled into the AVS if appropriate.

## 2022-06-08 ENCOUNTER — OFFICE VISIT (OUTPATIENT)
Dept: FAMILY MEDICINE CLINIC | Facility: CLINIC | Age: 47
End: 2022-06-08

## 2022-06-08 VITALS
WEIGHT: 204 LBS | DIASTOLIC BLOOD PRESSURE: 70 MMHG | SYSTOLIC BLOOD PRESSURE: 114 MMHG | HEIGHT: 65 IN | BODY MASS INDEX: 33.99 KG/M2 | HEART RATE: 91 BPM | TEMPERATURE: 98 F | OXYGEN SATURATION: 98 % | RESPIRATION RATE: 18 BRPM

## 2022-06-08 DIAGNOSIS — F43.9 STRESS: ICD-10-CM

## 2022-06-08 DIAGNOSIS — E11.9 TYPE 2 DIABETES MELLITUS WITHOUT COMPLICATION, WITHOUT LONG-TERM CURRENT USE OF INSULIN: Primary | ICD-10-CM

## 2022-06-08 DIAGNOSIS — E66.9 OBESITY (BMI 30-39.9): ICD-10-CM

## 2022-06-08 LAB
EXPIRATION DATE: NORMAL
HBA1C MFR BLD: 6.1 %
Lab: NORMAL

## 2022-06-08 PROCEDURE — 83036 HEMOGLOBIN GLYCOSYLATED A1C: CPT | Performed by: FAMILY MEDICINE

## 2022-06-08 PROCEDURE — 99214 OFFICE O/P EST MOD 30 MIN: CPT | Performed by: FAMILY MEDICINE

## 2022-06-08 NOTE — PROGRESS NOTES
"Chief Complaint  Diabetes and Hyperlipidemia     History of Present Illness  Maria E Barroso presents today for a follow up on her Diabetes and Hyperlipidemia. Patient is taking Atorvastatin, Trulicity, and Metformin.     Patient previous A1C was on 2/14/22 and was a 5.9. Today 6.1.      Stress waking up at 2;30 am every night mind racing. Usually go back to sleep but not good sleep.Has been ongoing for a long time.     Current Outpatient Medications on File Prior to Visit   Medication Sig   • atorvastatin (LIPITOR) 40 MG tablet    • Blood Glucose Monitoring Suppl (ACCU-CHEK [place] PLUS) w/Device kit 1 kit by Other route Daily.   • Dulaglutide (Trulicity) 4.5 MG/0.5ML solution pen-injector Inject 4.5 mg under the skin into the appropriate area as directed 1 (One) Time Per Week.   • glucose blood (ACCU-CHEK [place] PLUS) test strip 1 each by Other route Daily.   • Lancets (ACCU-CHEK SOFT TOUCH) lancets 1 each by Other route Daily.   • metFORMIN (GLUCOPHAGE) 500 MG tablet Take 1 tablet by mouth Daily With Breakfast.   • tiZANidine (ZANAFLEX) 2 MG tablet TAKE 1 TO 2 TABLETS BY MOUTH AT NIGHT AS NEEDED     No current facility-administered medications on file prior to visit.       Objective   Vital Signs:   /70   Pulse 91   Temp 98 °F (36.7 °C)   Resp 18   Ht 165.1 cm (65\")   Wt 92.5 kg (204 lb)   SpO2 98%   BMI 33.95 kg/m²       Physical Exam  Cardiovascular:      Pulses:           Dorsalis pedis pulses are 3+ on the right side and 3+ on the left side.   Musculoskeletal:      Right foot: Normal range of motion. No deformity, bunion, Charcot foot, foot drop or prominent metatarsal heads.      Left foot: Normal range of motion. No deformity, bunion, Charcot foot, foot drop or prominent metatarsal heads.        Feet:    Feet:      Right foot:      Protective Sensation: 10 sites tested. 9 sites sensed.      Skin integrity: Skin integrity normal. No ulcer, blister, skin breakdown, erythema, warmth, callus, dry " skin or fissure.      Toenail Condition: Right toenails are normal.      Left foot:      Protective Sensation: 10 sites tested. 10 sites sensed.      Skin integrity: Skin integrity normal. No ulcer, blister, skin breakdown, erythema, warmth, callus, dry skin or fissure.      Toenail Condition: Left toenails are normal.      Comments: Pt states she has had injections in her right foot for plantar fasciitis           Office Visit on 06/08/2022   Component Date Value Ref Range Status   • Hemoglobin A1C 06/08/2022 6.1  % Final   • Lot Number 06/08/2022 NA   Final   • Expiration Date 06/08/2022 NA   Final             Lab Results   Component Value Date    HGBA1C 6.1 06/08/2022    HGBA1C 5.9 (H) 02/14/2022    HGBA1C 6.3 (H) 11/23/2021    HGBA1C 6.0 (H) 08/17/2021    HGBA1C 6.2 (H) 04/23/2021                Assessment and Plan    Diagnoses and all orders for this visit:    1. Type 2 diabetes mellitus without complication, without long-term current use of insulin (HCC) (Primary)  -     POC Glycosylated Hemoglobin (Hb A1C)    2. Stress    3. Obesity (BMI 30-39.9)  Assessment & Plan:  Patient's (Body mass index is 33.95 kg/m².) indicates that they are obese (BMI >30) with health conditions that include diabetes mellitus . Weight is improving with treatment. BMI is is above average; BMI management plan is completed. We discussed low calorie, low carb based diet program, portion control, increasing exercise and trulicity.         Discussed LCS and reduced calorie diet and increased physical activity for better glycemic control and stress reduction . Pt declines fontaine to ozempic due to cost. Mindfulness based stress reduction. Declines referral to counselor but may look into on her own. Will recheck A1C in 3 months    There are no discontinued medications.      Follow Up     Return in about 3 months (around 9/8/2022) for Recheck, diabetes.    Patient was given instructions and counseling regarding her condition or for health  maintenance advice. Please see specific information pulled into the AVS if appropriate.

## 2022-06-08 NOTE — ASSESSMENT & PLAN NOTE
Patient's (Body mass index is 33.95 kg/m².) indicates that they are obese (BMI >30) with health conditions that include diabetes mellitus . Weight is improving with treatment. BMI is is above average; BMI management plan is completed. We discussed low calorie, low carb based diet program, portion control, increasing exercise and trulicity.

## 2022-08-17 ENCOUNTER — OFFICE VISIT (OUTPATIENT)
Dept: FAMILY MEDICINE CLINIC | Facility: CLINIC | Age: 47
End: 2022-08-17

## 2022-08-17 VITALS
WEIGHT: 201 LBS | HEART RATE: 94 BPM | DIASTOLIC BLOOD PRESSURE: 66 MMHG | HEIGHT: 65 IN | TEMPERATURE: 98.2 F | BODY MASS INDEX: 33.49 KG/M2 | OXYGEN SATURATION: 97 % | RESPIRATION RATE: 18 BRPM | SYSTOLIC BLOOD PRESSURE: 100 MMHG

## 2022-08-17 DIAGNOSIS — M79.10 MUSCLE TENSION PAIN: ICD-10-CM

## 2022-08-17 DIAGNOSIS — E66.9 OBESITY (BMI 30-39.9): ICD-10-CM

## 2022-08-17 DIAGNOSIS — E78.2 MIXED HYPERLIPIDEMIA: ICD-10-CM

## 2022-08-17 DIAGNOSIS — Z00.00 PHYSICAL EXAM: Primary | ICD-10-CM

## 2022-08-17 DIAGNOSIS — F43.9 STRESS: ICD-10-CM

## 2022-08-17 DIAGNOSIS — M54.2 NECK PAIN: ICD-10-CM

## 2022-08-17 DIAGNOSIS — E11.9 TYPE 2 DIABETES MELLITUS WITHOUT COMPLICATION, WITHOUT LONG-TERM CURRENT USE OF INSULIN: ICD-10-CM

## 2022-08-17 PROCEDURE — 99396 PREV VISIT EST AGE 40-64: CPT | Performed by: FAMILY MEDICINE

## 2022-08-17 PROCEDURE — 99214 OFFICE O/P EST MOD 30 MIN: CPT | Performed by: FAMILY MEDICINE

## 2022-08-17 RX ORDER — TIZANIDINE 2 MG/1
TABLET ORAL
Qty: 30 TABLET | Refills: 3 | Status: SHIPPED | OUTPATIENT
Start: 2022-08-17

## 2022-08-17 RX ORDER — ATORVASTATIN CALCIUM 40 MG/1
20 TABLET, FILM COATED ORAL NIGHTLY
Qty: 90 TABLET | Refills: 0
Start: 2022-08-17 | End: 2022-09-16

## 2022-08-17 RX ORDER — BUPROPION HYDROCHLORIDE 150 MG/1
TABLET, EXTENDED RELEASE ORAL
Qty: 60 TABLET | Refills: 0 | Status: SHIPPED | OUTPATIENT
Start: 2022-08-17 | End: 2022-09-12

## 2022-08-17 NOTE — PROGRESS NOTES
Chief Complaint   Patient presents with   • back and neck pain    • Annual Exam       Subjective     Maria E Barroso is a 47 y.o. female.     Physical Exam :   The patient is here: for coordination of medical care.  Patient's last comprehensive physical was on 10/23/2020.  Previous physical was performed by  Dr. Vasquez .  Patient has: moderate activity with work/home activities, exercises 2 - 3 times per week, good appetite, feels well with minor complaints, good energy level, is sleeping poorly, returned to full activity and shown improvement in their activity level  Patient's last tetanus shot was 10/23/2020. monthly. The Patient's last Mammogram was: 11/2021.    Neck and back pain:   Patient presents today and states that she has been having a lot of shoulder into neck and into lower part of head pain for some time now. She states that she gets shooting pain from her shoulder into the base of her neck and into head. She states that she gets this at times at home later in the evenings as well. She states that she has a lot of stress at this time due to job. She states that she has a lot of responsibility at this time and she states that at times it can get hard. She states that she is in the process of transitioning into a new position and she states that it will be less stressful for her but she states that she is not sure as to if this is what is wrong and causing her pain.   Going to start with [facility] told she needs a physical and a drug screen, drug screen will be done at their facility.  Unaware of any specific form or paperwork that needs to be completed.  Patient denies any limitations of mobility.  With stress she has noticed an increase in difficulty concentrating and staying on task.  In discussing potential treatments for stress she specifically asked if she can have something that would help with her concentration.  Further questioning she has multiple symptoms of attention  "deficit.  Struggled in school to make the grade she did would read pages without realizing what she had read, hard to stay on task lots of projects not completed etc.    The following portions of the patient's history were reviewed and updated as appropriate: allergies, current medications, past family history, past medical history, past social history, past surgical history and problem list.    Current Outpatient Medications on File Prior to Visit   Medication Sig   • Blood Glucose Monitoring Suppl (ACCU-CHEK [place] PLUS) w/Device kit 1 kit by Other route Daily.   • Dulaglutide (Trulicity) 4.5 MG/0.5ML solution pen-injector Inject 4.5 mg under the skin into the appropriate area as directed 1 (One) Time Per Week.   • glucose blood (ACCU-CHEK [place] PLUS) test strip 1 each by Other route Daily.   • Lancets (ACCU-CHEK SOFT TOUCH) lancets 1 each by Other route Daily.   • metFORMIN (GLUCOPHAGE) 500 MG tablet Take 1 tablet by mouth Daily With Breakfast.   • [DISCONTINUED] atorvastatin (LIPITOR) 40 MG tablet    • [DISCONTINUED] tiZANidine (ZANAFLEX) 2 MG tablet TAKE 1 TO 2 TABLETS BY MOUTH AT NIGHT AS NEEDED     No current facility-administered medications on file prior to visit.     Medications Discontinued During This Encounter   Medication Reason   • tiZANidine (ZANAFLEX) 2 MG tablet    • atorvastatin (LIPITOR) 40 MG tablet Reorder       Review of Systems     Objective   Vitals:    08/17/22 1433   BP: 100/66   Pulse: 94   Resp: 18   Temp: 98.2 °F (36.8 °C)   SpO2: 97%   Weight: 91.2 kg (201 lb)   Height: 165.1 cm (65\")   PainSc:   3      Body mass index is 33.45 kg/m².    Physical Exam  Constitutional:       General: She is not in acute distress.     Appearance: She is well-developed. She is obese.   HENT:      Head: Normocephalic and atraumatic.      Right Ear: External ear normal.      Left Ear: External ear normal.   Eyes:      Conjunctiva/sclera: Conjunctivae normal.      Pupils: Pupils are equal, round, and reactive " to light.   Neck:      Thyroid: No thyromegaly.      Trachea: No tracheal deviation.   Cardiovascular:      Rate and Rhythm: Normal rate and regular rhythm.      Heart sounds: No murmur heard.    No friction rub. No gallop.   Pulmonary:      Effort: Pulmonary effort is normal.      Breath sounds: Normal breath sounds. No wheezing or rales.   Abdominal:      General: Bowel sounds are normal.      Palpations: Abdomen is soft.      Tenderness: There is no abdominal tenderness.   Musculoskeletal:         General: Normal range of motion.      Cervical back: Normal range of motion and neck supple.      Comments: Very tight muscles in posterior shoulder and between shoulder blades, no significant tenderness to palpation.  There is no tenderness to palpation of the neck.  Patient has normal range of motion and strength and tone of neck, shoulders, upper and lower extremities.   Lymphadenopathy:      Cervical: No cervical adenopathy.   Skin:     General: Skin is warm and dry.      Findings: No rash.   Neurological:      Mental Status: She is alert and oriented to person, place, and time.   Psychiatric:         Mood and Affect: Mood normal.         Behavior: Behavior normal.         Thought Content: Thought content normal.         Judgment: Judgment normal.             Lab Results   Component Value Date    HGBA1C 6.1 06/08/2022    HGBA1C 5.9 (H) 02/14/2022    HGBA1C 6.3 (H) 11/23/2021    HGBA1C 6.0 (H) 08/17/2021    HGBA1C 6.2 (H) 04/23/2021        Procedures     Assessment & Plan   Diagnoses and all orders for this visit:    1. Physical exam (Primary)    2. Obesity (BMI 30-39.9)  Assessment & Plan:  Patient's (Body mass index is 33.45 kg/m².) indicates that they are obese (BMI >30) with health conditions that include hypertension and diabetes mellitus . Weight is improving with lifestyle modifications. BMI is is above average; BMI management plan is completed. We discussed portion control and increasing exercise.       3.  Stress  -     buPROPion SR (Wellbutrin SR) 150 MG 12 hr tablet; 1 in am for 3 days then 1 bid  Dispense: 60 tablet; Refill: 0    4. Neck pain  -     tiZANidine (ZANAFLEX) 2 MG tablet; 1-2 q 8 hours if needed for muscle tension  Dispense: 30 tablet; Refill: 3    5. Muscle tension pain  -     tiZANidine (ZANAFLEX) 2 MG tablet; 1-2 q 8 hours if needed for muscle tension  Dispense: 30 tablet; Refill: 3    6. Type 2 diabetes mellitus without complication, without long-term current use of insulin (HCC)  -     Comprehensive Metabolic Panel; Future  -     Hemoglobin A1c; Future  -     MicroAlbumin, Urine, Random - Urine, Clean Catch; Future    7. Mixed hyperlipidemia  -     Lipid Panel; Future  -     atorvastatin (LIPITOR) 40 MG tablet; Take 0.5 tablets by mouth Every Night.  Dispense: 90 tablet; Refill: 0  The patient was counseled regarding nutrition, physical activity, healthy weight, injury prevention, immunizations and preventative health screenings.  Recommend Prevnar 20 she will check insurance for coverage.  She will schedule with her ophthalmologist at [facility] in [place].  Check Cologuard was negative last year have updated care gaps.      Stress causing posterior neck muscle spasm discussed stretching exercises, heat, massage, muscle relaxer.  Symptoms of possible attention deficit after discussion we are going to try Wellbutrin starting 150 mg in the morning and increasing to twice daily assuming it does not affect her sleep.  She will return in 1 month for further evaluation  Medications Discontinued During This Encounter   Medication Reason   • tiZANidine (ZANAFLEX) 2 MG tablet    • atorvastatin (LIPITOR) 40 MG tablet Reorder          Return in about 4 weeks (around 9/14/2022) for Recheck stress (welbutrin) and diabetes.    Education reference material relevant to visit available in AVS through [employer] or printed copy given.

## 2022-08-17 NOTE — ASSESSMENT & PLAN NOTE
Patient's (Body mass index is 33.45 kg/m².) indicates that they are obese (BMI >30) with health conditions that include hypertension and diabetes mellitus . Weight is improving with lifestyle modifications. BMI is is above average; BMI management plan is completed. We discussed portion control and increasing exercise.

## 2022-09-11 DIAGNOSIS — F43.9 STRESS: ICD-10-CM

## 2022-09-12 RX ORDER — BUPROPION HYDROCHLORIDE 150 MG/1
TABLET, EXTENDED RELEASE ORAL
Qty: 60 TABLET | Refills: 0 | Status: SHIPPED | OUTPATIENT
Start: 2022-09-12 | End: 2022-10-10

## 2022-09-16 DIAGNOSIS — E78.2 MIXED HYPERLIPIDEMIA: ICD-10-CM

## 2022-09-16 RX ORDER — ATORVASTATIN CALCIUM 40 MG/1
TABLET, FILM COATED ORAL
Qty: 90 TABLET | Refills: 3 | Status: SHIPPED | OUTPATIENT
Start: 2022-09-16

## 2022-10-07 ENCOUNTER — TELEPHONE (OUTPATIENT)
Dept: FAMILY MEDICINE CLINIC | Facility: CLINIC | Age: 47
End: 2022-10-07

## 2022-10-07 NOTE — TELEPHONE ENCOUNTER
Refill request for Bupropion HCL SR 150mg.  Patient was supposed to follow up 1 month from last office visit in August. Called patient to advise her that she will need upcoming appt before we can send any additional refills.

## 2022-10-09 DIAGNOSIS — F43.9 STRESS: ICD-10-CM

## 2022-10-10 DIAGNOSIS — F43.9 STRESS: ICD-10-CM

## 2022-10-10 RX ORDER — BUPROPION HYDROCHLORIDE 150 MG/1
TABLET, EXTENDED RELEASE ORAL
Qty: 180 TABLET | Refills: 0 | Status: SHIPPED | OUTPATIENT
Start: 2022-10-10 | End: 2023-02-20

## 2022-10-10 RX ORDER — BUPROPION HYDROCHLORIDE 150 MG/1
TABLET, EXTENDED RELEASE ORAL
Qty: 60 TABLET | Refills: 0 | Status: SHIPPED | OUTPATIENT
Start: 2022-10-10 | End: 2022-10-10 | Stop reason: SDUPTHER

## 2022-10-14 DIAGNOSIS — F43.9 STRESS: ICD-10-CM

## 2022-10-17 NOTE — PROGRESS NOTES
Chief Complaint  Diabetes and Anxiety     History of Present Illness    Maria E Barroso presents today for Stress and Diabetes follow up.    Current Stress prescribed Wellbutrin 150 mg at last visit.  Stress Improvement is none at this time. Patient did not start taking this. She was concerned about starting a medication for mental health due to family hx. Sister has schizophrenia and bipolar mood disorder and currently is in penitentiary.     Did change jobs, not exactly as expected still having to go to 2 buildings.     Patient does checks her blood sugars at home.  Blood Sugars range from  95 to 100,  average 95  Diabetic symptoms include none at this time.  Patient does not see a podiatrist.   Current treatment: Metformin 500mg  And Trulicity 4.5 mg weekly Patients last a1c was 6.1 yesterday 10/17/2022.    A1C Last 3 Results    HGBA1C Last 3 Results 2/14/22 6/8/22 10/17/22   Hemoglobin A1C 5.9 (A) 6.1 6.1 (A)   (A) Abnormal value       Comments are available for some flowsheets but are not being displayed.                 Current Outpatient Medications on File Prior to Visit   Medication Sig   • atorvastatin (LIPITOR) 40 MG tablet TAKE 1 TABLET BY MOUTH EVERY DAY AT NIGHT   • Blood Glucose Monitoring Suppl (ACCU-CHEK [place] PLUS) w/Device kit 1 kit by Other route Daily.   • Dulaglutide (Trulicity) 4.5 MG/0.5ML solution pen-injector Inject 4.5 mg under the skin into the appropriate area as directed 1 (One) Time Per Week.   • glucose blood (ACCU-CHEK [place] PLUS) test strip 1 each by Other route Daily.   • Lancets (ACCU-CHEK SOFT TOUCH) lancets 1 each by Other route Daily.   • metFORMIN (GLUCOPHAGE) 500 MG tablet Take 1 tablet by mouth Daily With Breakfast.   • tiZANidine (ZANAFLEX) 2 MG tablet 1-2 q 8 hours if needed for muscle tension   • buPROPion SR (WELLBUTRIN SR) 150 MG 12 hr tablet Take 1 tablet by mouth in the moring for 3 days, then 1 tablet twice daily.     No current facility-administered medications on file  "prior to visit.       Objective   Vital Signs:   /68 (BP Location: Right arm, Patient Position: Sitting, Cuff Size: Adult)   Pulse 95   Temp 98.4 °F (36.9 °C) (Temporal)   Resp 18   Ht 165.1 cm (65\")   Wt 91.6 kg (202 lb)   SpO2 96%   BMI 33.61 kg/m²       Physical Exam  Vitals and nursing note reviewed.   Constitutional:       General: She is not in acute distress.     Appearance: She is well-developed.   HENT:      Head: Normocephalic and atraumatic.   Cardiovascular:      Rate and Rhythm: Normal rate and regular rhythm.      Heart sounds: No murmur heard.  Pulmonary:      Effort: Pulmonary effort is normal.      Breath sounds: Normal breath sounds. No wheezing.   Musculoskeletal:         General: Normal range of motion.   Skin:     General: Skin is warm and dry.      Findings: No rash.   Neurological:      Mental Status: She is alert and oriented to person, place, and time.            No visits with results within 1 Day(s) from this visit.   Latest known visit with results is:   Results Encounter on 09/01/2022   Component Date Value Ref Range Status   • Glucose 10/17/2022 116 (H)  70 - 99 mg/dL Final   • BUN 10/17/2022 11  6 - 24 mg/dL Final   • Creatinine 10/17/2022 0.74  0.57 - 1.00 mg/dL Final   • EGFR Result 10/17/2022 99  >59 mL/min/1.73 Final   • BUN/Creatinine Ratio 10/17/2022 15  9 - 23 Final   • Sodium 10/17/2022 141  134 - 144 mmol/L Final   • Potassium 10/17/2022 4.7  3.5 - 5.2 mmol/L Final   • Chloride 10/17/2022 101  96 - 106 mmol/L Final   • Total CO2 10/17/2022 26  20 - 29 mmol/L Final   • Calcium 10/17/2022 10.5 (H)  8.7 - 10.2 mg/dL Final   • Total Protein 10/17/2022 7.9  6.0 - 8.5 g/dL Final   • Albumin 10/17/2022 5.0 (H)  3.8 - 4.8 g/dL Final   • Globulin 10/17/2022 2.9  1.5 - 4.5 g/dL Final   • A/G Ratio 10/17/2022 1.7  1.2 - 2.2 Final   • Total Bilirubin 10/17/2022 0.4  0.0 - 1.2 mg/dL Final   • Alkaline Phosphatase 10/17/2022 101  44 - 121 IU/L Final   • AST (SGOT) 10/17/2022 19 "  0 - 40 IU/L Final   • ALT (SGPT) 10/17/2022 34 (H)  0 - 32 IU/L Final   • Hemoglobin A1C 10/17/2022 6.1 (H)  4.8 - 5.6 % Final    Comment:          Prediabetes: 5.7 - 6.4           Diabetes: >6.4           Glycemic control for adults with diabetes: <7.0     • Total Cholesterol 10/17/2022 130  100 - 199 mg/dL Final   • Triglycerides 10/17/2022 116  0 - 149 mg/dL Final   • HDL Cholesterol 10/17/2022 36 (L)  >39 mg/dL Final   • VLDL Cholesterol Myles 10/17/2022 21  5 - 40 mg/dL Final   • LDL Chol Calc (NIH) 10/17/2022 73  0 - 99 mg/dL Final   • Microalbumin, Urine 10/17/2022 9.8  Not Estab. ug/mL Final       Lab Results   Component Value Date    BUN 11 10/17/2022    CREATININE 0.74 10/17/2022     10/17/2022    K 4.7 10/17/2022     10/17/2022    CALCIUM 10.5 (H) 10/17/2022    ALBUMIN 5.0 (H) 10/17/2022    BILITOT 0.4 10/17/2022    ALKPHOS 101 10/17/2022    AST 19 10/17/2022    ALT 34 (H) 10/17/2022    CHLPL 130 10/17/2022    TRIG 116 10/17/2022    HDL 36 (L) 10/17/2022    VLDL 21 10/17/2022    LDL 73 10/17/2022    MICROALBUR 9.8 10/17/2022        Lab Results   Component Value Date    HGBA1C 6.1 (H) 10/17/2022    HGBA1C 6.1 06/08/2022    HGBA1C 5.9 (H) 02/14/2022    HGBA1C 6.3 (H) 11/23/2021    HGBA1C 6.0 (H) 08/17/2021                Assessment and Plan    Diagnoses and all orders for this visit:    1. Type 2 diabetes mellitus without complication, without long-term current use of insulin (HCC) (Primary)  -     Hemoglobin A1c; Future  -     Comprehensive Metabolic Panel; Future    2. Stress    3. Class 1 obesity due to excess calories with serious comorbidity and body mass index (BMI) of 33.0 to 33.9 in adult  Assessment & Plan:  Patient's (Body mass index is 33.61 kg/m².) indicates that they are obese (BMI >30) with health conditions that include diabetes mellitus and dyslipidemias . Weight is unchanged. BMI is is above average; BMI management plan is completed. We discussed low calorie, low carb based diet  program, portion control and increasing exercise.       4. Need for influenza vaccination  -     FluLaval/Fluarix/Fluzone >6 Months    5. History of tobacco use disorder    Flu shot today  Diabetes very good control continue current medications  Stress, patient states she does want to start welbutrin.  Discussed how to avoid triggers to not restart cig use.   Need to schedule with ophthalmologist for diabetic eye exam.    There are no discontinued medications.      Follow Up     Return in about 3 months (around 1/18/2023).    Patient was given instructions and counseling regarding her condition or for health maintenance advice. Please see specific information pulled into the AVS if appropriate.

## 2022-10-18 ENCOUNTER — OFFICE VISIT (OUTPATIENT)
Dept: FAMILY MEDICINE CLINIC | Facility: CLINIC | Age: 47
End: 2022-10-18

## 2022-10-18 VITALS
RESPIRATION RATE: 18 BRPM | SYSTOLIC BLOOD PRESSURE: 110 MMHG | TEMPERATURE: 98.4 F | WEIGHT: 202 LBS | HEIGHT: 65 IN | OXYGEN SATURATION: 96 % | DIASTOLIC BLOOD PRESSURE: 68 MMHG | BODY MASS INDEX: 33.66 KG/M2 | HEART RATE: 95 BPM

## 2022-10-18 DIAGNOSIS — E66.09 CLASS 1 OBESITY DUE TO EXCESS CALORIES WITH SERIOUS COMORBIDITY AND BODY MASS INDEX (BMI) OF 33.0 TO 33.9 IN ADULT: ICD-10-CM

## 2022-10-18 DIAGNOSIS — Z87.891 HISTORY OF TOBACCO USE DISORDER: ICD-10-CM

## 2022-10-18 DIAGNOSIS — E11.9 TYPE 2 DIABETES MELLITUS WITHOUT COMPLICATION, WITHOUT LONG-TERM CURRENT USE OF INSULIN: Primary | ICD-10-CM

## 2022-10-18 DIAGNOSIS — Z23 NEED FOR INFLUENZA VACCINATION: ICD-10-CM

## 2022-10-18 DIAGNOSIS — F43.9 STRESS: ICD-10-CM

## 2022-10-18 PROBLEM — E66.811 CLASS 1 OBESITY DUE TO EXCESS CALORIES WITH SERIOUS COMORBIDITY AND BODY MASS INDEX (BMI) OF 33.0 TO 33.9 IN ADULT: Status: ACTIVE | Noted: 2019-10-14

## 2022-10-18 PROCEDURE — 90686 IIV4 VACC NO PRSV 0.5 ML IM: CPT | Performed by: FAMILY MEDICINE

## 2022-10-18 PROCEDURE — 90471 IMMUNIZATION ADMIN: CPT | Performed by: FAMILY MEDICINE

## 2022-10-18 PROCEDURE — 99214 OFFICE O/P EST MOD 30 MIN: CPT | Performed by: FAMILY MEDICINE

## 2022-10-18 NOTE — ASSESSMENT & PLAN NOTE
Patient's (Body mass index is 33.61 kg/m².) indicates that they are obese (BMI >30) with health conditions that include diabetes mellitus and dyslipidemias . Weight is unchanged. BMI is is above average; BMI management plan is completed. We discussed low calorie, low carb based diet program, portion control and increasing exercise.

## 2022-12-20 ENCOUNTER — TELEPHONE (OUTPATIENT)
Dept: FAMILY MEDICINE CLINIC | Facility: CLINIC | Age: 47
End: 2022-12-20

## 2022-12-20 NOTE — TELEPHONE ENCOUNTER
I am unclear as to why she is upset.  I saw her October 18 asked her to return in 3 months and she was scheduled on January 18.  I see she is now scheduled with Jo Vasquez with a note that she is transferring to [place] to be closer to home. Was she is scheduled after 3 PM and needed a later appointment, this is my best guess as to what could have upset her.  Does Kelley need to call patient to make sure everything is okay?

## 2022-12-20 NOTE — TELEPHONE ENCOUNTER
Patient called in regards to her appointment scheduled on 1/18/23. She is upset and states she will be finding a new doctor due to the schedule change. She states it is not convenient for her.

## 2022-12-21 NOTE — TELEPHONE ENCOUNTER
Patient stated that ending patient care at 2:45 was crazy and completely inconvenient for patients.

## 2023-01-12 DIAGNOSIS — E11.9 TYPE 2 DIABETES MELLITUS WITHOUT COMPLICATION, WITHOUT LONG-TERM CURRENT USE OF INSULIN: ICD-10-CM

## 2023-01-12 RX ORDER — DULAGLUTIDE 4.5 MG/.5ML
4.5 INJECTION, SOLUTION SUBCUTANEOUS WEEKLY
Qty: 2 ML | Refills: 0 | Status: SHIPPED | OUTPATIENT
Start: 2023-01-12 | End: 2023-02-20 | Stop reason: SDUPTHER

## 2023-01-12 NOTE — TELEPHONE ENCOUNTER
Upcoming appointment is with Dr. Jo Vasquez, she is transferring care.  I am sending in a 1 month supply of Trulicity she can get future refills from Dr. Jo Vasquez.

## 2023-02-20 ENCOUNTER — OFFICE VISIT (OUTPATIENT)
Dept: FAMILY MEDICINE CLINIC | Facility: CLINIC | Age: 48
End: 2023-02-20
Payer: COMMERCIAL

## 2023-02-20 VITALS
TEMPERATURE: 94.8 F | DIASTOLIC BLOOD PRESSURE: 64 MMHG | BODY MASS INDEX: 34.39 KG/M2 | OXYGEN SATURATION: 99 % | HEIGHT: 65 IN | WEIGHT: 206.4 LBS | HEART RATE: 67 BPM | SYSTOLIC BLOOD PRESSURE: 106 MMHG

## 2023-02-20 DIAGNOSIS — E78.2 MIXED HYPERLIPIDEMIA: ICD-10-CM

## 2023-02-20 DIAGNOSIS — M79.10 MUSCLE TENSION PAIN: ICD-10-CM

## 2023-02-20 DIAGNOSIS — E11.9 TYPE 2 DIABETES MELLITUS WITHOUT COMPLICATION, WITHOUT LONG-TERM CURRENT USE OF INSULIN: Primary | ICD-10-CM

## 2023-02-20 PROCEDURE — 99214 OFFICE O/P EST MOD 30 MIN: CPT | Performed by: FAMILY MEDICINE

## 2023-02-20 RX ORDER — DULAGLUTIDE 4.5 MG/.5ML
4.5 INJECTION, SOLUTION SUBCUTANEOUS WEEKLY
Qty: 6 ML | Refills: 3 | Status: SHIPPED | OUTPATIENT
Start: 2023-02-20 | End: 2023-03-20

## 2023-02-21 DIAGNOSIS — E11.9 TYPE 2 DIABETES MELLITUS WITHOUT COMPLICATION, WITHOUT LONG-TERM CURRENT USE OF INSULIN: ICD-10-CM

## 2023-02-21 LAB
ALBUMIN SERPL-MCNC: 4.5 G/DL (ref 3.8–4.8)
ALBUMIN/GLOB SERPL: 1.7 {RATIO} (ref 1.2–2.2)
ALP SERPL-CCNC: 89 IU/L (ref 44–121)
ALT SERPL-CCNC: 22 IU/L (ref 0–32)
AST SERPL-CCNC: 17 IU/L (ref 0–40)
BILIRUB SERPL-MCNC: 0.4 MG/DL (ref 0–1.2)
BUN SERPL-MCNC: 10 MG/DL (ref 6–24)
BUN/CREAT SERPL: 12 (ref 9–23)
CALCIUM SERPL-MCNC: 9.3 MG/DL (ref 8.7–10.2)
CHLORIDE SERPL-SCNC: 104 MMOL/L (ref 96–106)
CO2 SERPL-SCNC: 25 MMOL/L (ref 20–29)
CREAT SERPL-MCNC: 0.85 MG/DL (ref 0.57–1)
EGFRCR SERPLBLD CKD-EPI 2021: 84 ML/MIN/1.73
GLOBULIN SER CALC-MCNC: 2.6 G/DL (ref 1.5–4.5)
GLUCOSE SERPL-MCNC: 108 MG/DL (ref 70–99)
HBA1C MFR BLD: 6.3 % (ref 4.8–5.6)
MAGNESIUM SERPL-MCNC: 2.2 MG/DL (ref 1.6–2.3)
POTASSIUM SERPL-SCNC: 4.8 MMOL/L (ref 3.5–5.2)
PROT SERPL-MCNC: 7.1 G/DL (ref 6–8.5)
SODIUM SERPL-SCNC: 140 MMOL/L (ref 134–144)

## 2023-02-24 ENCOUNTER — TELEPHONE (OUTPATIENT)
Dept: FAMILY MEDICINE CLINIC | Facility: CLINIC | Age: 48
End: 2023-02-24
Payer: COMMERCIAL

## 2023-02-24 NOTE — TELEPHONE ENCOUNTER
HUB TO READ     LEFT DETAILED MESSAGE. DR BENSON IS OUT OF THE OFFICE UNTIL Monday AND I HAVE NO ONE IN THE OFFICE TO WRITE HER ANYTHING. SHE CAN DO ALLERGY MEDICATION OVER THE COUNTER. ZYTREC, CLARITIN OR ALLERGA AND SHE  CAN ALSO DO A NASAL SPRAY. SHE SHOULD ALSO COVID TEST HERSELF. IF SHE IS WANTING AN ANTIBIOTICS SHE WILL HAVE TO BE SEEN SO SUGGEST THE URGENT CARE SOMEWHERE.

## 2023-02-24 NOTE — TELEPHONE ENCOUNTER
Caller: Maria E Barroso    Relationship: Self     Best call back number: [phone]    What medication are you requesting: SOMETHING FOR SINUS INFECTION    What are your current symptoms: CONGESTED,SNEEZING,SINUS DRAINAGE    How long have you been experiencing symptoms:    Have you had these symptoms before:    [] Yes  [] No    Have you been treated for these symptoms before:   [] Yes  [] No    If a prescription is needed, what is your preferred pharmacy and phone number: [id number] - [place], [place] - [address] AT [facility] - [phone]  - [phone]      Additional notes:

## 2023-03-19 DIAGNOSIS — E11.9 TYPE 2 DIABETES MELLITUS WITHOUT COMPLICATION, WITHOUT LONG-TERM CURRENT USE OF INSULIN: ICD-10-CM

## 2023-03-20 RX ORDER — DULAGLUTIDE 4.5 MG/.5ML
INJECTION, SOLUTION SUBCUTANEOUS
Qty: 2 ML | Refills: 5 | Status: SHIPPED | OUTPATIENT
Start: 2023-03-20

## 2023-07-28 ENCOUNTER — OFFICE VISIT (OUTPATIENT)
Dept: FAMILY MEDICINE CLINIC | Facility: CLINIC | Age: 48
End: 2023-07-28
Payer: COMMERCIAL

## 2023-07-28 VITALS
SYSTOLIC BLOOD PRESSURE: 108 MMHG | BODY MASS INDEX: 34.16 KG/M2 | WEIGHT: 205 LBS | OXYGEN SATURATION: 98 % | TEMPERATURE: 97.1 F | DIASTOLIC BLOOD PRESSURE: 60 MMHG | HEART RATE: 74 BPM | HEIGHT: 65 IN

## 2023-07-28 DIAGNOSIS — E66.09 CLASS 1 OBESITY DUE TO EXCESS CALORIES WITH SERIOUS COMORBIDITY AND BODY MASS INDEX (BMI) OF 33.0 TO 33.9 IN ADULT: ICD-10-CM

## 2023-07-28 DIAGNOSIS — E78.2 MIXED HYPERLIPIDEMIA: ICD-10-CM

## 2023-07-28 DIAGNOSIS — E11.9 TYPE 2 DIABETES MELLITUS WITHOUT COMPLICATION, WITHOUT LONG-TERM CURRENT USE OF INSULIN: Primary | ICD-10-CM

## 2023-07-29 LAB
ALBUMIN SERPL-MCNC: 4.8 G/DL (ref 3.9–4.9)
ALBUMIN/GLOB SERPL: 1.9 {RATIO} (ref 1.2–2.2)
ALP SERPL-CCNC: 95 IU/L (ref 44–121)
ALT SERPL-CCNC: 19 IU/L (ref 0–32)
AST SERPL-CCNC: 15 IU/L (ref 0–40)
BILIRUB SERPL-MCNC: <0.2 MG/DL (ref 0–1.2)
BUN SERPL-MCNC: 15 MG/DL (ref 6–24)
BUN/CREAT SERPL: 18 (ref 9–23)
CALCIUM SERPL-MCNC: 9.9 MG/DL (ref 8.7–10.2)
CHLORIDE SERPL-SCNC: 102 MMOL/L (ref 96–106)
CO2 SERPL-SCNC: 25 MMOL/L (ref 20–29)
CREAT SERPL-MCNC: 0.82 MG/DL (ref 0.57–1)
EGFRCR SERPLBLD CKD-EPI 2021: 88 ML/MIN/1.73
GLOBULIN SER CALC-MCNC: 2.5 G/DL (ref 1.5–4.5)
GLUCOSE SERPL-MCNC: 88 MG/DL (ref 70–99)
HBA1C MFR BLD: 6.3 % (ref 4.8–5.6)
POTASSIUM SERPL-SCNC: 5.1 MMOL/L (ref 3.5–5.2)
PROT SERPL-MCNC: 7.3 G/DL (ref 6–8.5)
SODIUM SERPL-SCNC: 141 MMOL/L (ref 134–144)

## 2023-08-13 NOTE — PROGRESS NOTES
Subjective   Maria E Barroso is a 48 y.o. female.   Chief Complaint   Patient presents with    Weight Check       History of Present Illness   Presents to the office today to follow-up on chronic medical problems for active problem list as below.  I met her about 6 months ago when she transferred care to me from Dr. Susanna Vasquez.    She has type 2 diabetes.  Blood sugars range from a low of 92 to a high of 120.  Last A1c was in February and it was excellent at 6.3%.  She is tolerating Trulicity, metformin without side effects such as nausea, diarrhea.    HLD-she is on Lipitor 40 mg/day.  Last lipid panel was October of last year.  It was excellent.  LDL was 73.    Weight today is 205 pounds.  Weight was 206 pounds and I saw her back in February.    She has no other complaints today.        Patient Active Problem List    Diagnosis Date Noted    Mixed hyperlipidemia 09/29/2020    Class 1 obesity due to excess calories with serious comorbidity and body mass index (BMI) of 33.0 to 33.9 in adult 10/14/2019    Onychomycosis of toenail 04/01/2016    Abnormal mammogram 10/14/2015    Leukocytosis 08/06/2014    Nonspecific abnormal results of function study of liver 07/03/2014    Type 2 diabetes mellitus 05/07/2014           Past Surgical History:   Procedure Laterality Date    HYSTERECTOMY      total     TIBIA FRACTURE SURGERY Left     plate with screws     Current Outpatient Medications on File Prior to Visit   Medication Sig    atorvastatin (LIPITOR) 40 MG tablet TAKE 1 TABLET BY MOUTH EVERY DAY AT NIGHT    Blood Glucose Monitoring Suppl (ACCU-CHEK [place] PLUS) w/Device kit 1 kit by Other route Daily.    glucose blood (ACCU-CHEK [place] PLUS) test strip 1 each by Other route Daily.    Lancets (ACCU-CHEK SOFT TOUCH) lancets 1 each by Other route Daily.    metFORMIN (GLUCOPHAGE) 500 MG tablet Take 1 tablet by mouth Daily With Breakfast.    Trulicity 4.5 MG/0.5ML solution pen-injector INJECT 4.5 MG UNDER THE SKIN INTO THE  "APPROPRIATE AREA AS DIRECTED 1 (ONE) TIME PER WEEK.     No current facility-administered medications on file prior to visit.     Allergies   Allergen Reactions    Contrast Dye (Echo Or Unknown Ct/Mr) Hives     Social History     Socioeconomic History    Marital status:    Tobacco Use    Smoking status: Former     Packs/day: 1.00     Years: 29.00     Pack years: 29.00     Types: Cigarettes     Start date:      Quit date:      Years since quittin.6    Smokeless tobacco: Never   Vaping Use    Vaping Use: Never used   Substance and Sexual Activity    Alcohol use: No    Drug use: No    Sexual activity: Yes     Partners: Male     Birth control/protection: Surgical     Family History   Problem Relation Age of Onset    Diabetes Mother     Hypertension Mother     Arthritis Mother     Bipolar disorder Mother     Stroke Father         age 55-56    Hypertension Father     Glaucoma Father     Bipolar disorder Sister     Diabetes Sister     Glaucoma Sister        Review of Systems    Objective   /60 (BP Location: Left arm, Patient Position: Sitting, Cuff Size: Adult)   Pulse 74   Temp 97.1 øF (36.2 øC) (Infrared)   Ht 165.1 cm (65\")   Wt 93 kg (205 lb)   LMP  (LMP Unknown)   SpO2 98%   BMI 34.11 kg/mý   Physical Exam  Constitutional:       Appearance: She is well-developed.      Comments:      HENT:      Head: Normocephalic and atraumatic.   Eyes:      Conjunctiva/sclera: Conjunctivae normal.   Cardiovascular:      Rate and Rhythm: Normal rate.   Pulmonary:      Effort: Pulmonary effort is normal.   Musculoskeletal:         General: Normal range of motion.      Cervical back: Normal range of motion.   Skin:     General: Skin is warm and dry.      Findings: No rash.   Neurological:      Mental Status: She is alert and oriented to person, place, and time.   Psychiatric:         Behavior: Behavior normal.         No visits with results within 4 Month(s) from this visit.   Latest known visit with " results is:   Office Visit on 02/20/2023   Component Date Value Ref Range Status    Hemoglobin A1C 02/20/2023 6.3 (H)  4.8 - 5.6 % Final    Comment:          Prediabetes: 5.7 - 6.4           Diabetes: >6.4           Glycemic control for adults with diabetes: <7.0      Glucose 02/20/2023 108 (H)  70 - 99 mg/dL Final    BUN 02/20/2023 10  6 - 24 mg/dL Final    Creatinine 02/20/2023 0.85  0.57 - 1.00 mg/dL Final    EGFR Result 02/20/2023 84  >59 mL/min/1.73 Final    BUN/Creatinine Ratio 02/20/2023 12  9 - 23 Final    Sodium 02/20/2023 140  134 - 144 mmol/L Final    Potassium 02/20/2023 4.8  3.5 - 5.2 mmol/L Final    Chloride 02/20/2023 104  96 - 106 mmol/L Final    Total CO2 02/20/2023 25  20 - 29 mmol/L Final    Calcium 02/20/2023 9.3  8.7 - 10.2 mg/dL Final    Total Protein 02/20/2023 7.1  6.0 - 8.5 g/dL Final    Albumin 02/20/2023 4.5  3.8 - 4.8 g/dL Final    Globulin 02/20/2023 2.6  1.5 - 4.5 g/dL Final    A/G Ratio 02/20/2023 1.7  1.2 - 2.2 Final    Total Bilirubin 02/20/2023 0.4  0.0 - 1.2 mg/dL Final    Alkaline Phosphatase 02/20/2023 89  44 - 121 IU/L Final    AST (SGOT) 02/20/2023 17  0 - 40 IU/L Final    ALT (SGPT) 02/20/2023 22  0 - 32 IU/L Final    Magnesium 02/20/2023 2.2  1.6 - 2.3 mg/dL Final         Assessment & Plan   Diagnoses and all orders for this visit:    1. Type 2 diabetes mellitus without complication, without long-term current use of insulin (Primary)  -     Hemoglobin A1c  -     Comprehensive Metabolic Panel    2. Class 1 obesity due to excess calories with serious comorbidity and body mass index (BMI) of 33.0 to 33.9 in adult    Overall, Maria E is doing well.  She is on the maximum dose of Trulicity.  I do not know that she will see much more weight loss.  Her diabetes control is excellent and she is happy about that.  Blood pressure is good at 108/60.  We will repeat labs today to follow status of her diabetes as above.  We will see her back in around 4 months.  We will do an A1c, CMP,  lipid panel at that time.  Chart indicates a history of leukocytosis and I do not know if that was in the setting of an illness.  I will check a CBC with her labs then.    Her last mammogram was December 10, 2021.  Current guidelines do support mammography every other year for women between the ages of 40 and 74.  We will talk to her about this at the next visit.  Continue all current medicines at current doses.  Overall, I spent 20 minutes on the day of service discussing all the above with Maria E.      Call with any problems or concerns before next visit       Return in about 4 months (around 11/28/2023).      Much of this report is an electronic transcription of spoken language to printed text using Dragon dictation software.  As such, the subtleties and finesse of spoken language may permit erroneous, or at times, nonsensical words or phrases to be inadvertently transcribed; thus changes may be made at a later date to rectify these errors.     Jo Vasquez MD8/13/202311:56 EDT  This note has been electronically signed

## 2023-08-16 NOTE — PROGRESS NOTES
Progress note    C/C: Patient presents with: Follow - Up: INJ: 6/23/2023 pt comes in to f/u after Bilateral L2 transforaminal epidural. Admits 50% relief for a couple months. presents with L side low back and L hip aching pain. Admits numbness in L leg. Admits weakness. Rates pain 5/10. Takes tylenol PRN. Completed PT 3 weeks ago. HPI: 78-year-old female presents for follow-up. Since the last time I saw her she had about 6 weeks of relief following bilateral L2 transforaminal epidural steroid injection with improved ability to tolerate standing and walking, however symptoms have now returned to baseline. She continues to have difficulty with standing and walking even short distances. She uses a rolling walker. There is some intermittent numbness that she states is in the whole left leg with standing and walking. She has quite mild intermittent left leg pain with sitting. Left lower abdomen/groin feels swollen. Pertinent allergies:   Cortisone               UNKNOWN    Comment:Burning sensation to area injected. Latex                   RASH     Physical exam:  There were no vitals taken for this visit. Short stride length  Pain transitioning from sit-stand  No pain with lumbar flexion or extension; stretching sensation in the groin with lumbar extension  Examined in the seated position in chair  Pain with resisted left hip flexion  Pain with passive terminal left hip flexion    Imaging: No new imaging to review    Assessment and plan  Left groin and thigh pain  Lumbar stenosis    Recommend trial of duloxetine 20mg qDaily; GFR does not permit dosing higher than that. MRI of the left hip ordered given pain with weight bearing, swelling in the left groin/lower abdomen. Best to avoid repeating VIJAY until late October given number of steroid injections over the past calendar year. Follow up in 6 weeks.      Prabha Ramirez DO  Physical Medicine and [address] [place] [address] Subjective   Maria E Barroso is a 47 y.o. female.   Chief Complaint   Patient presents with   • Diabetes       History of Present Illness   Presents to the office today as a new patient to me.  Previously saw Dr. Cyndi Vasquez who is no longer located in this practice.  We had an appointment on the 25th that I unfortunately had to cancel because of COVID-19.  Her last visit with Dr. Hanna was October 2022.  Last lab work was also in October and include an A1c of 6.1%, lipid panel which was excellent, CMP that was grossly normal and a microalbumin of 9.8.  She does not check her blood sugar very often.  She has had no symptoms of low blood sugar.  No burning or numbness in her feet.  No sores or ulcerations.  She needs refills on several medicines.    At 1 point she was on bupropion, but the stressful situations in her life improved and she stopped taking that.  She has Zanaflex and takes that rarely for muscle tension pain.      Patient Active Problem List    Diagnosis Date Noted   • Mixed hyperlipidemia 09/29/2020   • Class 1 obesity due to excess calories with serious comorbidity and body mass index (BMI) of 33.0 to 33.9 in adult 10/14/2019   • Onychomycosis of toenail 04/01/2016   • Abnormal mammogram 10/14/2015   • Leukocytosis 08/06/2014   • Nonspecific abnormal results of function study of liver 07/03/2014   • Type 2 diabetes mellitus (HCC) 05/07/2014           Past Surgical History:   Procedure Laterality Date   • HYSTERECTOMY      total    • TIBIA FRACTURE SURGERY Left     plate with screws     Current Outpatient Medications on File Prior to Visit   Medication Sig   • atorvastatin (LIPITOR) 40 MG tablet TAKE 1 TABLET BY MOUTH EVERY DAY AT NIGHT   • Blood Glucose Monitoring Suppl (ACCU-CHEK [place] PLUS) w/Device kit 1 kit by Other route Daily.   • glucose blood (ACCU-CHEK [place] PLUS) test strip 1 each by Other route Daily.   • Lancets (ACCU-CHEK SOFT TOUCH) lancets 1 each by Other route Daily.   •  "tiZANidine (ZANAFLEX) 2 MG tablet 1-2 q 8 hours if needed for muscle tension   • [DISCONTINUED] metFORMIN (GLUCOPHAGE) 500 MG tablet Take 1 tablet by mouth Daily With Breakfast.   • [DISCONTINUED] Trulicity 4.5 MG/0.5ML solution pen-injector INJECT 4.5 MG UNDER THE SKIN INTO THE APPROPRIATE AREA AS DIRECTED 1 (ONE) TIME PER WEEK.   • [DISCONTINUED] buPROPion SR (WELLBUTRIN SR) 150 MG 12 hr tablet Take 1 tablet by mouth in the moring for 3 days, then 1 tablet twice daily.     No current facility-administered medications on file prior to visit.     Allergies   Allergen Reactions   • Contrast Dye (Echo Or Unknown Ct/Mr) Hives     Social History     Socioeconomic History   • Marital status:    Tobacco Use   • Smoking status: Former     Packs/day: 1.00     Years: 29.00     Pack years: 29.00     Types: Cigarettes     Start date:      Quit date:      Years since quittin.1   • Smokeless tobacco: Never   Vaping Use   • Vaping Use: Never used   Substance and Sexual Activity   • Alcohol use: No   • Drug use: No   • Sexual activity: Yes     Partners: Male     Birth control/protection: Surgical     Family History   Problem Relation Age of Onset   • Diabetes Mother    • Hypertension Mother    • Arthritis Mother    • Bipolar disorder Mother    • Stroke Father         age 55-56   • Hypertension Father    • Glaucoma Father    • Bipolar disorder Sister    • Diabetes Sister    • Glaucoma Sister        Review of Systems    Objective   /64 (BP Location: Right arm, Patient Position: Sitting, Cuff Size: Adult)   Pulse 67   Temp 94.8 °F (34.9 °C) (Infrared)   Ht 165.1 cm (65\")   Wt 93.6 kg (206 lb 6.4 oz)   LMP  (LMP Unknown)   SpO2 99%   BMI 34.35 kg/m²   Physical Exam  Constitutional:       Appearance: Normal appearance. She is well-developed. She is not toxic-appearing.      Comments: Wearing a face mask     HENT:      Head: Normocephalic and atraumatic.   Eyes:      Conjunctiva/sclera: Conjunctivae " normal.   Cardiovascular:      Rate and Rhythm: Normal rate and regular rhythm.   Pulmonary:      Effort: Pulmonary effort is normal. No respiratory distress.   Musculoskeletal:         General: Normal range of motion.      Cervical back: Normal range of motion.      Right lower leg: No edema.      Left lower leg: No edema.   Skin:     General: Skin is warm and dry.      Findings: No rash.   Neurological:      Mental Status: She is alert and oriented to person, place, and time.      Gait: Gait normal.   Psychiatric:         Mood and Affect: Mood normal.         Behavior: Behavior normal.           No visits with results within 4 Month(s) from this visit.   Latest known visit with results is:   Results Encounter on 09/01/2022   Component Date Value Ref Range Status   • Glucose 10/17/2022 116 (H)  70 - 99 mg/dL Final   • BUN 10/17/2022 11  6 - 24 mg/dL Final   • Creatinine 10/17/2022 0.74  0.57 - 1.00 mg/dL Final   • EGFR Result 10/17/2022 99  >59 mL/min/1.73 Final   • BUN/Creatinine Ratio 10/17/2022 15  9 - 23 Final   • Sodium 10/17/2022 141  134 - 144 mmol/L Final   • Potassium 10/17/2022 4.7  3.5 - 5.2 mmol/L Final   • Chloride 10/17/2022 101  96 - 106 mmol/L Final   • Total CO2 10/17/2022 26  20 - 29 mmol/L Final   • Calcium 10/17/2022 10.5 (H)  8.7 - 10.2 mg/dL Final   • Total Protein 10/17/2022 7.9  6.0 - 8.5 g/dL Final   • Albumin 10/17/2022 5.0 (H)  3.8 - 4.8 g/dL Final   • Globulin 10/17/2022 2.9  1.5 - 4.5 g/dL Final   • A/G Ratio 10/17/2022 1.7  1.2 - 2.2 Final   • Total Bilirubin 10/17/2022 0.4  0.0 - 1.2 mg/dL Final   • Alkaline Phosphatase 10/17/2022 101  44 - 121 IU/L Final   • AST (SGOT) 10/17/2022 19  0 - 40 IU/L Final   • ALT (SGPT) 10/17/2022 34 (H)  0 - 32 IU/L Final   • Hemoglobin A1C 10/17/2022 6.1 (H)  4.8 - 5.6 % Final    Comment:          Prediabetes: 5.7 - 6.4           Diabetes: >6.4           Glycemic control for adults with diabetes: <7.0     • Total Cholesterol 10/17/2022 130  100 - 199  mg/dL Final   • Triglycerides 10/17/2022 116  0 - 149 mg/dL Final   • HDL Cholesterol 10/17/2022 36 (L)  >39 mg/dL Final   • VLDL Cholesterol Myles 10/17/2022 21  5 - 40 mg/dL Final   • LDL Chol Calc (NIH) 10/17/2022 73  0 - 99 mg/dL Final   • Microalbumin, Urine 10/17/2022 9.8  Not Estab. ug/mL Final         Assessment & Plan   Diagnoses and all orders for this visit:    1. Type 2 diabetes mellitus without complication, without long-term current use of insulin (HCC) (Primary)  -     Dulaglutide (Trulicity) 4.5 MG/0.5ML solution pen-injector; Inject 0.5 mL under the skin into the appropriate area as directed 1 (One) Time Per Week.  Dispense: 6 mL; Refill: 3  -     metFORMIN (GLUCOPHAGE) 500 MG tablet; Take 1 tablet by mouth Daily With Breakfast.  Dispense: 90 tablet; Refill: 3  -     Hemoglobin A1c  -     Comprehensive Metabolic Panel  -     Magnesium    2. Mixed hyperlipidemia    3. Muscle tension pain    Multiple new problems reviewed today.  Historically her diabetes has been under good control.  We will recheck labs today as above.  Refill metformin and Trulicity at current doses.  We will make changes if needed based on results of tests.  We will follow cholesterol once a year.  Okay to use as needed Zanaflex for muscle pains.  I will follow-up with her when her test results are back and I would like to see her again in 3 months or sooner if needed.        Call with any problems or concerns before next visit       Return in about 3 months (around 5/20/2023).      Much of this report is an electronic transcription of spoken language to printed text using Dragon dictation software.  As such, the subtleties and finesse of spoken language may permit erroneous, or at times, nonsensical words or phrases to be inadvertently transcribed; thus changes may be made at a later date to rectify these errors.     Jo Vasquez MD2/20/202317:21 EST  This note has been electronically signed

## 2023-11-13 NOTE — TELEPHONE ENCOUNTER
[facility]  Heart Failure Clinic Progress Note      Reason or Visit: Routine f/u BP    Referred By: [facility] discharge list    BP Readings from Last 6 Encounters:   11/13/23 124/81   10/16/23 122/80   09/26/23 (!) 142/77   09/15/23 134/66   09/11/23 116/62   08/21/23 127/81     Pulse Readings from Last 6 Encounters:   11/13/23 64   10/16/23 62   09/26/23 72   09/15/23 71   09/11/23 (!) 56   08/21/23 60     Wt Readings from Last 6 Encounters:   11/13/23 68.4 kg (150 lb 12.7 oz)   10/16/23 69.7 kg (153 lb 10.6 oz)   09/26/23 70.1 kg (154 lb 10.4 oz)   09/15/23 70 kg (154 lb 5.2 oz)   09/11/23 70 kg (154 lb 5.2 oz)   08/21/23 70 kg (154 lb 5.2 oz)         Visit History:     11/13/23 in [facility], pt doing well, BP good. CPM.    10/16/23 in [facility], pt doing well, BP better. CPM    9/26/23 DR Collins said CPM (BP was up)    8/21/23 in [facility], pt doing well. Has all meds correct. CPM. Advised schedule yearly echo.    6/27/23 Dr Collins said CPM    5/1/23 in [facility], pt doing well on reduced furosemide. CPM    4/3/23 in [facility], pt doing well but taking furosemide twice a day, advised daily    3/21/23 DR Collins said CPM    12/14/22 in [facility], pt doing well. CPM    10/18/22 Dr Collins said CPM    10/12/22 in [facility], BP much better on jardiance. CPM. Pt getting labs on 11/11 9/29/22 Called pt who said he was able to get jardiance and he started today. Said not too expensive. HF f/u already scheduled     9/27/22 in [facility], pt has all meds correct, BP still up. Advised start jardiance 10 mg daily, call me if can get it or not. If not we'll do isdn/hydralazine    9/7/22 in [facility], pt doing well but BP up a bit, advised start amlodipine at 2.5 mg daily. Advised schedule echo prior to upcoming card appt    7/19/22 Dr Collins said pt refused sleep study, CPM    6/8/22 Dr Brandon said no recurrence of VT off amio    5/6/22 in [facility], pt has all meds correct and doing well! BP  PATIENT NEEDS TO DISCUSS A MEDICATION-Dulaglutide (Trulicity) 3 MG/0.5ML solution pen-injector    PLEASE CALL    CALL BACK #: [phone]    improved.    4/19/22 Dr Collins said CPM    4/8/22 in [facility], pt didn't increase entresto. He did decrease his furosemide to 20 mg daily. Advised increase entresto to     3/23/22 in [facility], pt BP up some still. Advised decrease furosemide back to 20 mg daily and increase entresto to  mg twice a day.    2/25/22 Called pt about the alirocumab inj he wasn't taking. PCP wants him to have it. He doesn't remember anything about it. Explained it was a injection to help with cholesterol and to ask pharmacist how and where to inject. Will reorder and pt agrees to start    2/23/22 in [facility], pt BP up. Taking furosemide 20 mg twice a day alternating with daily. Advised increase to twice a day. Pt not taking alirocumab injections?    12/2021 Dr Mayo stopped amio    10/19/21 Dr Collins said improved EF, CPM, f/u 6 mo    9/3/21 in [facility], pt doing well, CPM. Advised limited echo prior to card    8/11/21 in [facility], pt had single chamber ICD placed. Pt back to taking furosemide 20 mg twice a day, advised decrease back to daily.    7/15/21 Dr Collins said CPM    6/1/21 in [facility], pt c/o breast tenderness. Advised change spiron to eplerenone. Advised get labs ordered by PCP in 1 to 2 weeks, added K. Pt awaiting word from card on ICD placement.    5/26/21 Dr Mayo said pt agrees to ICD. Continue amio for now, will likely stop it next visit.    5/18/21 Pt will be getting entresto today. On phone pt says his last lisinopril was this morning at 8 am so advised stop lisinopril and start entresto tomorrow night at 8 pm. Also decrease furosemide to 20 mg daily. Pt wrote down instructions. Agrees to follow up visit on 6/1 5/11/21 in [facility], pt advised to try and get entresto 49-51 and call if able or not. If so will stop lisnipril and decrease furosemide to 20 mg daily.    4/13/21 Dr Collins referred pt to EP for AICD    3/29/21 in [facility], pt has all meds correct! Pt is filling his own pill  box. Pt still with some extra fluid so advised take metolazone 2.5 mg every other day for 4 doses then return to as needed. Will consider entresto after echo/card    3/16/21 DR Collins ordered echo    3/8/22 in [facility], pt is missing amiodarone and furosemide! Wt up 10 lbs. He says he can't think as clearly as before. His caregiver Nuris agrees to fill pill box for him weekly, gave them a pill box. Advised restart amio, restart furosemide (but call if have been taking it), and increase metoprolol from 37.5 to 50 mg a day. Pt agrees to entresto but will start next visit after meds all back in order.    2/22/21, GFR down but pt just lost 25 lbs on metolazone. Called pt and advised CPM for now. Will revisit lisinopril titration later. F/u visit scheduled already for 3.8. Pt verbalizes understanding and agreement     2/19/21 in [facility], pt has lost 25 lbs since on metolazone 4 times a week and now appears euvolemic. Advised decrease metolazone down to as needed. Advised BMP today. Advised increase metoprolol to 37.5 mg daily    2/2/21 Dr Collins added metolazone 4 times a week with a watchful eye on renal function. Pt to f/u in HF Clinic.      1/28/2021 in [facility], pt wt down several lbs, feeling a bit better, doing better with low na diet. Advised BMP today on increased spironolactone. Advised increase furosemide to 40/20 for 3 days then back to 20 mg twice a day. Pt declined pall care, has POA in place already    1/15/21 in [facility] Enrollment visit pleasant man here with caregiver Nuris who helps with meals and meds and was the one who called 911 initially. Pt doing fairly well but is  hypervolemic. Advised pt to increase furosemide to 40/20 mg for 3 days then back to 20 mg twice a day and increase spironolactone to 25 mg daily. Pt to see card Niall on 1/27. Pt wearing life vest    ER/Hospitalization History:     7/2 to 7/4/21 [facility] elective for AICD, single chamber placed on 7/2 c/w small left apical  pneumothorax.    Pt at [facility] 12/5 to 12/15/20, presented with cardiac arrest. Pt talking on phone and dropped and friend called 911. Pt intubated, found EF at 10 to 15%. Transferred to [facility] 12/15 to 1/1/21. Had IABP on 121/16, CABG on 12/21/20. While there had some VT, thoracentesis of right pleural effusion, and ANITA. Pt discharged with a life vest. pBNP on 12/5 was 873 and on 12/11/20 was 2690.    Past Medical History:   Diagnosis Date   • Arthritis    • BPH (benign prostatic hyperplasia)    • CAD, multiple vessel 12/30/2020   • Cardiac arrest (CMD)    • Chronic kidney disease    • CKD (chronic kidney disease)    • Congestive cardiac failure (CMD)    • Diabetes mellitus (CMD)    • Essential (primary) hypertension    • Fracture    • Gastroesophageal reflux disease    • High cholesterol    • Insomnia    • Malignant neoplasm (CMD)    • Severe left ventricular systolic dysfunction    • Tubular adenoma    • Ventricular tachycardia (CMD)    • Vitamin D deficiency        Past Surgical History:   Procedure Laterality Date   • Cabg, arterial, three     • Cardiac catherization     • Cardiac surgery     • Colonoscopy  07/30/2019    benign polyps were removed   • Icd was implanted  07/02/2021   • Left heart cath     • Prostatectomy     • Tonsillectomy         Pertinent Cardiodiagnostics:     9/11/23 echo showed EF of 42%. AK areas. Nl RVSF. Mild to mod AR    9/9/22 EF 30%. AK areas and thinning. Grade I lila dys. Nl RVSF    9/14/21 echo showed EF of 40%. Grade I lila dys. Mild LA dil. Nl RVSF    4/13/2021 echo showed AK areas. EF 25 to 30%. Grade II lila dys. Severe LA dil. RVSD    12/27/20 echo showed EF of 26%. AK areas. Grade II lila dys. Severe RA nad LA dil. Mod TR    12/15/20 EF wsa 27%. Mild LA dil. Jerry pleural effusions.    12/14/20 at [facility] echo showed EF of 10 to 15%.    12/14/20 cath showed severe multivessel disease    9/5/2019 stress echo showed EF of 55 to 60%. No stress induced ischemia    HPI    Home  Monitoring/Diet/Exercise:  • Daily weights: adherent, stable 140  • Sodium restriction: fair stopped eating turkey deli, accent, and occasional [employer].  • Exercise: regular exercise 8000 steps a day, does it with stairs too    HF Symptom Assessment:   • General: denies fatigue   • Shortness of breath: none noted walking into exam, none in house, none with ADLs,none with 5 stairs,  none walking a lot   • Functional capacity: stable, 3+ blocks  • Orthopnea: denies, 2 pillow  • PND: denies, none  • GI:denies abdominal distention, ascites is not present   • LE edema: stable, none today  • Cough: denies  • Chest pain: denies  • Dizziness/lightheadedness: denies, none    The patient has not been evaluated for SAHARA. The patient is adherent to CPAP therapy: None of the time.    Medication adherence:   Medications reconciled per medication bottles. Patient reports missing 0 medication doses in the last week. Patient does use a pillbox. The patient did take her medications today.    Current Outpatient Medications   Medication Sig Dispense Refill   • furosemide (LASIX) 20 MG tablet Take 1 tablet by mouth daily. 90 tablet 3   • atorvastatin (LIPITOR) 40 MG tablet Take 1 tablet by mouth nightly. 90 tablet 1   • eplerenone (INSPRA) 25 MG tablet Take 1 tablet by mouth daily. 90 tablet 1   • amLODIPine (NORVASC) 2.5 MG tablet Take 1 tablet by mouth daily. 90 tablet 1   • Jardiance 10 MG tablet Take 1 tablet by mouth daily (before breakfast) 90 tablet 3   • sacubitril-valsartan (Entresto)  MG per tablet Take 1 tablet by mouth every 12 hours. 60 tablet 5   • metoPROLOL succinate (TOPROL-XL) 50 MG 24 hr tablet Take 1 tablet by mouth daily. With food 90 tablet 3   • acetaminophen (TYLENOL) 500 MG tablet Take 1 tablet by mouth every 6 hours as needed for Pain. 30 tablet 0   • cholecalciferol (VITAMIN D) 25 mcg (1,000 units) tablet Take 2 tablets by mouth daily. 90 tablet 3   • aspirin 81 MG EC tablet Take 1 tablet by mouth daily. With  food       No current facility-administered medications for this visit.       Physical Exam  Cardiovascular:      Rate and Rhythm: Regular rhythm.      Comments: No early beats  Pulmonary:      Breath sounds: Normal breath sounds.   Abdominal:      Palpations: Abdomen is soft.   Musculoskeletal:      Right lower leg: No edema.      Left lower leg: No edema.      Comments: better           LABS:  Recent Lab results:   Sodium (mmol/L)   Date Value   09/08/2023 141     Potassium (mmol/L)   Date Value   09/08/2023 4.3     BUN (mg/dL)   Date Value   09/08/2023 26 (H)     Creatinine (mg/dL)   Date Value   09/08/2023 1.16     GFR Estimate,  (no units)   Date Value   10/08/2020 82     GFR Estimate, Non  (no units)   Date Value   10/08/2020 71         ASSESSMENT/PLAN:    HFrEF  Stage C, NYHA FC II   Compensated  and euvolemic   Etiology ICM  -Recommend:    pt doing well, BP good. CPM.    Hypertension  Goal BP <130/80  -BP today is  at goal    Counseling/education provided at today’s visit:   how to contact HF clinic  importance of medication adherence to improve heart function  low sodium diet  perform daily weights  call clinic if any new symptoms of shortness of breath or leg swelling  call clinic if weight gain of 3 lbs in 7 days or less  call clinic if any new sypmtoms of dizziness or light headedness      FOLLOW-UP  The patient will return to HF clinic in a few months, will call pt    TIME SPENT ON VISIT  25 minutes were spent in face-to-face discussion related to the medical management of the patient.     Order date:  10/16/2023    Luci Fontenot RN

## 2023-12-01 ENCOUNTER — OFFICE VISIT (OUTPATIENT)
Dept: FAMILY MEDICINE CLINIC | Facility: CLINIC | Age: 48
End: 2023-12-01
Payer: COMMERCIAL

## 2023-12-01 VITALS
RESPIRATION RATE: 18 BRPM | OXYGEN SATURATION: 93 % | WEIGHT: 203.4 LBS | BODY MASS INDEX: 33.89 KG/M2 | DIASTOLIC BLOOD PRESSURE: 59 MMHG | HEART RATE: 68 BPM | SYSTOLIC BLOOD PRESSURE: 94 MMHG | HEIGHT: 65 IN | TEMPERATURE: 96.4 F

## 2023-12-01 DIAGNOSIS — E78.2 MIXED HYPERLIPIDEMIA: ICD-10-CM

## 2023-12-01 DIAGNOSIS — Z23 NEED FOR IMMUNIZATION AGAINST INFLUENZA: ICD-10-CM

## 2023-12-01 DIAGNOSIS — E11.9 TYPE 2 DIABETES MELLITUS WITHOUT COMPLICATION, WITHOUT LONG-TERM CURRENT USE OF INSULIN: Primary | ICD-10-CM

## 2023-12-01 RX ORDER — ATORVASTATIN CALCIUM 40 MG/1
40 TABLET, FILM COATED ORAL
Qty: 90 TABLET | Refills: 3 | Status: SHIPPED | OUTPATIENT
Start: 2023-12-01

## 2023-12-01 RX ORDER — DULAGLUTIDE 4.5 MG/.5ML
4.5 INJECTION, SOLUTION SUBCUTANEOUS
Qty: 6 ML | Refills: 3 | Status: SHIPPED | OUTPATIENT
Start: 2023-12-01

## 2023-12-01 NOTE — PROGRESS NOTES
Subjective   Maria E Barroso is a 48 y.o. female.   Chief Complaint   Patient presents with    Diabetes    Hyperlipidemia       History of Present Illness   48-year-old white female with history of diabetes and high cholesterol comes to the office today for follow-up.  I last saw her about 4 months ago.  Last A1c was late July and it was in the very good range at 6.3%.  Blood sugars at home range from 80-90 on the low end to 110-130 on the high end.  She typically checks her blood sugar once a day.  She is on Trulicity 4.5 mg weekly, metformin 500 mg daily with breakfast      Last lipid panel was just over 1 year ago and it was excellent.  Tolerating Lipitor 40 mg/day without myalgias.  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    She is ready for flu shot.    She has no other complaints today.    Patient Active Problem List    Diagnosis Date Noted    Mixed hyperlipidemia 09/29/2020    Class 1 obesity due to excess calories with serious comorbidity and body mass index (BMI) of 33.0 to 33.9 in adult 10/14/2019    Onychomycosis of toenail 04/01/2016    Abnormal mammogram 10/14/2015    Leukocytosis 08/06/2014    Nonspecific abnormal results of function study of liver 07/03/2014    Type 2 diabetes mellitus 05/07/2014           Past Surgical History:   Procedure Laterality Date    HYSTERECTOMY      total     TIBIA FRACTURE SURGERY Left     plate with screws     Current Outpatient Medications on File Prior to Visit   Medication Sig    Blood Glucose Monitoring Suppl (ACCU-CHEK [place] PLUS) w/Device kit 1 kit by Other route Daily.    glucose blood (ACCU-CHEK [place] PLUS) test strip 1 each by Other route Daily.    ibuprofen (ADVIL,MOTRIN) 200 MG tablet Take 2 tablets by mouth Every 6 (Six) Hours As Needed for Mild Pain.    Lancets (ACCU-CHEK SOFT TOUCH) lancets 1 each by Other route Daily.     No current facility-administered medications on file prior to visit.     Allergies   Allergen Reactions     "Contrast Dye (Echo Or Unknown Ct/Mr) Hives     Social History     Socioeconomic History    Marital status:    Tobacco Use    Smoking status: Former     Packs/day: 1.00     Years: 29.00     Additional pack years: 0.00     Total pack years: 29.00     Types: Cigarettes     Start date:      Quit date:      Years since quittin.9     Passive exposure: Past    Smokeless tobacco: Never   Vaping Use    Vaping Use: Never used   Substance and Sexual Activity    Alcohol use: No    Drug use: No    Sexual activity: Yes     Partners: Male     Birth control/protection: Surgical     Family History   Problem Relation Age of Onset    Diabetes Mother     Hypertension Mother     Arthritis Mother     Bipolar disorder Mother     Stroke Father         age 55-56    Hypertension Father     Glaucoma Father     Bipolar disorder Sister     Diabetes Sister     Glaucoma Sister        Review of Systems    Objective   BP 94/59 (BP Location: Right arm, Patient Position: Sitting, Cuff Size: Large Adult)   Pulse 68   Temp 96.4 °F (35.8 °C) (Infrared)   Resp 18   Ht 165.1 cm (65\")   Wt 92.3 kg (203 lb 6.4 oz)   LMP  (LMP Unknown)   SpO2 93%   Breastfeeding No   BMI 33.85 kg/m²   Physical Exam  Constitutional:       Appearance: She is well-developed.      Comments:      HENT:      Head: Normocephalic and atraumatic.   Eyes:      Conjunctiva/sclera: Conjunctivae normal.   Cardiovascular:      Rate and Rhythm: Normal rate.   Pulmonary:      Effort: Pulmonary effort is normal.   Musculoskeletal:         General: Normal range of motion.      Cervical back: Normal range of motion.   Skin:     General: Skin is warm and dry.      Findings: No rash.   Neurological:      Mental Status: She is alert and oriented to person, place, and time.   Psychiatric:         Behavior: Behavior normal.               BMI is >= 30 and <35. (Class 1 Obesity). The following options were offered after discussion;: exercise counseling/recommendations and " nutrition counseling/recommendations     Assessment & Plan   Diagnoses and all orders for this visit:    1. Type 2 diabetes mellitus without complication, without long-term current use of insulin (Primary)  -     Hemoglobin A1c  -     Comprehensive Metabolic Panel  -     CBC & Differential  -     metFORMIN (GLUCOPHAGE) 500 MG tablet; Take 1 tablet by mouth Daily With Breakfast.  Dispense: 90 tablet; Refill: 3  -     Dulaglutide (Trulicity) 4.5 MG/0.5ML solution pen-injector; Inject 0.5 mL under the skin into the appropriate area as directed Every 7 (Seven) Days.  Dispense: 6 mL; Refill: 3    2. Mixed hyperlipidemia  -     Lipid Panel  -     atorvastatin (LIPITOR) 40 MG tablet; Take 1 tablet by mouth every night at bedtime.  Dispense: 90 tablet; Refill: 3    3. Need for immunization against influenza  -     Fluzone (or Fluarix & Flulaval for VFC) >6mos    Status of multiple chronic medical problems reviewed today as above.  Diabetes has been under good control.  High cholesterol has been under good control.  Tolerating all medicines without side effects.  Blood pressure is not elevated.  Continue all current medicines at current doses.  Those are refilled today.  Labs will be done to monitor status of her diabetes and high cholesterol as well as screen for renal insufficiency.  I will follow-up with her when the blood test are back.  Follow-up here again in 4 months or sooner if needed.        Call with any problems or concerns before next visit       Return in about 4 months (around 4/1/2024).      Much of this report is an electronic transcription of spoken language to printed text using Dragon dictation software.  As such, the subtleties and finesse of spoken language may permit erroneous, or at times, nonsensical words or phrases to be inadvertently transcribed; thus changes may be made at a later date to rectify these errors.     Jo Vasquez MD12/2/202310:21 EST  This note has been electronically  signed

## 2023-12-02 LAB
ALBUMIN SERPL-MCNC: 4.7 G/DL (ref 3.9–4.9)
ALBUMIN/GLOB SERPL: 1.7 {RATIO} (ref 1.2–2.2)
ALP SERPL-CCNC: 96 IU/L (ref 44–121)
ALT SERPL-CCNC: 19 IU/L (ref 0–32)
AST SERPL-CCNC: 13 IU/L (ref 0–40)
BASOPHILS # BLD AUTO: 0.1 X10E3/UL (ref 0–0.2)
BASOPHILS NFR BLD AUTO: 0 %
BILIRUB SERPL-MCNC: 0.5 MG/DL (ref 0–1.2)
BUN SERPL-MCNC: 10 MG/DL (ref 6–24)
BUN/CREAT SERPL: 13 (ref 9–23)
CALCIUM SERPL-MCNC: 9.7 MG/DL (ref 8.7–10.2)
CHLORIDE SERPL-SCNC: 100 MMOL/L (ref 96–106)
CHOLEST SERPL-MCNC: 140 MG/DL (ref 100–199)
CO2 SERPL-SCNC: 24 MMOL/L (ref 20–29)
CREAT SERPL-MCNC: 0.79 MG/DL (ref 0.57–1)
EGFRCR SERPLBLD CKD-EPI 2021: 91 ML/MIN/1.73
EOSINOPHIL # BLD AUTO: 0.2 X10E3/UL (ref 0–0.4)
EOSINOPHIL NFR BLD AUTO: 1 %
ERYTHROCYTE [DISTWIDTH] IN BLOOD BY AUTOMATED COUNT: 13.5 % (ref 11.7–15.4)
GLOBULIN SER CALC-MCNC: 2.8 G/DL (ref 1.5–4.5)
GLUCOSE SERPL-MCNC: 95 MG/DL (ref 70–99)
HBA1C MFR BLD: 6.3 % (ref 4.8–5.6)
HCT VFR BLD AUTO: 42.1 % (ref 34–46.6)
HDLC SERPL-MCNC: 36 MG/DL
HGB BLD-MCNC: 14 G/DL (ref 11.1–15.9)
IMM GRANULOCYTES # BLD AUTO: 0 X10E3/UL (ref 0–0.1)
IMM GRANULOCYTES NFR BLD AUTO: 0 %
LDLC SERPL CALC-MCNC: 79 MG/DL (ref 0–99)
LYMPHOCYTES # BLD AUTO: 4.9 X10E3/UL (ref 0.7–3.1)
LYMPHOCYTES NFR BLD AUTO: 41 %
MCH RBC QN AUTO: 27.7 PG (ref 26.6–33)
MCHC RBC AUTO-ENTMCNC: 33.3 G/DL (ref 31.5–35.7)
MCV RBC AUTO: 83 FL (ref 79–97)
MONOCYTES # BLD AUTO: 0.5 X10E3/UL (ref 0.1–0.9)
MONOCYTES NFR BLD AUTO: 5 %
NEUTROPHILS # BLD AUTO: 6.1 X10E3/UL (ref 1.4–7)
NEUTROPHILS NFR BLD AUTO: 53 %
PLATELET # BLD AUTO: 425 X10E3/UL (ref 150–450)
POTASSIUM SERPL-SCNC: 4.8 MMOL/L (ref 3.5–5.2)
PROT SERPL-MCNC: 7.5 G/DL (ref 6–8.5)
RBC # BLD AUTO: 5.05 X10E6/UL (ref 3.77–5.28)
SODIUM SERPL-SCNC: 139 MMOL/L (ref 134–144)
TRIGL SERPL-MCNC: 139 MG/DL (ref 0–149)
VLDLC SERPL CALC-MCNC: 25 MG/DL (ref 5–40)
WBC # BLD AUTO: 11.8 X10E3/UL (ref 3.4–10.8)

## 2024-04-01 ENCOUNTER — OFFICE VISIT (OUTPATIENT)
Dept: FAMILY MEDICINE CLINIC | Facility: CLINIC | Age: 49
End: 2024-04-01
Payer: COMMERCIAL

## 2024-04-01 VITALS
SYSTOLIC BLOOD PRESSURE: 112 MMHG | DIASTOLIC BLOOD PRESSURE: 73 MMHG | HEIGHT: 65 IN | HEART RATE: 69 BPM | OXYGEN SATURATION: 95 % | WEIGHT: 209.6 LBS | BODY MASS INDEX: 34.92 KG/M2 | TEMPERATURE: 96.6 F | RESPIRATION RATE: 18 BRPM

## 2024-04-01 DIAGNOSIS — E11.9 TYPE 2 DIABETES MELLITUS WITHOUT COMPLICATION, WITHOUT LONG-TERM CURRENT USE OF INSULIN: Primary | ICD-10-CM

## 2024-04-01 DIAGNOSIS — Z12.39 BREAST SCREENING: ICD-10-CM

## 2024-04-01 DIAGNOSIS — Z12.31 ENCOUNTER FOR SCREENING MAMMOGRAM FOR MALIGNANT NEOPLASM OF BREAST: ICD-10-CM

## 2024-04-01 DIAGNOSIS — J40 BRONCHITIS: ICD-10-CM

## 2024-04-01 RX ORDER — ALBUTEROL SULFATE 90 UG/1
2 AEROSOL, METERED RESPIRATORY (INHALATION) EVERY 6 HOURS PRN
COMMUNITY
Start: 2024-03-11

## 2024-04-01 RX ORDER — AZITHROMYCIN 250 MG/1
TABLET, FILM COATED ORAL
Qty: 6 TABLET | Refills: 0 | Status: SHIPPED | OUTPATIENT
Start: 2024-04-01

## 2024-04-01 RX ORDER — BENZONATATE 200 MG/1
200 CAPSULE ORAL 3 TIMES DAILY PRN
COMMUNITY
Start: 2024-03-11

## 2024-04-01 NOTE — PROGRESS NOTES
Subjective   Maria E Barroso is a 48 y.o. female.   Chief Complaint   Patient presents with    Diabetes    Hyperlipidemia    Dizziness       History of Present Illness   Presents to the office today to follow-up on her diabetes.  Last A1c was December 1.  It was excellent at 6.3%.  Lipid panel done at that time was also excellent.  Blood pressure is good at 112/73.      New complaint today is that of dizziness.  Single episode- looking up at [employer].  Resolved within seconds.    Went to  recently 2 weeks ago with cough - given mucinex, tessalon, MDI  Only feels 50% better.  Tells me the cough is now productive of thick green phlegm.  Able to work and get through her day like normal, but really coughs a lot in the mornings.  No fevers.  No muscle aches.  No headaches.      Patient Active Problem List    Diagnosis Date Noted    Mixed hyperlipidemia 09/29/2020    Class 1 obesity due to excess calories with serious comorbidity and body mass index (BMI) of 33.0 to 33.9 in adult 10/14/2019    Onychomycosis of toenail 04/01/2016    Abnormal mammogram 10/14/2015    Leukocytosis 08/06/2014    Nonspecific abnormal results of function study of liver 07/03/2014    Type 2 diabetes mellitus 05/07/2014           Past Surgical History:   Procedure Laterality Date    HYSTERECTOMY      total     TIBIA FRACTURE SURGERY Left     plate with screws     Current Outpatient Medications on File Prior to Visit   Medication Sig    albuterol sulfate  (90 Base) MCG/ACT inhaler Inhale 2 puffs Every 6 (Six) Hours As Needed.    atorvastatin (LIPITOR) 40 MG tablet Take 1 tablet by mouth every night at bedtime.    benzonatate (TESSALON) 200 MG capsule Take 1 capsule by mouth 3 (Three) Times a Day As Needed.    Blood Glucose Monitoring Suppl (ACCU-CHEK [place] PLUS) w/Device kit 1 kit by Other route Daily.    Dulaglutide (Trulicity) 4.5 MG/0.5ML solution pen-injector Inject 0.5 mL under the skin into the appropriate area as directed Every  "7 (Seven) Days.    glucose blood (ACCU-CHEK [place] PLUS) test strip 1 each by Other route Daily.    ibuprofen (ADVIL,MOTRIN) 200 MG tablet Take 2 tablets by mouth Every 6 (Six) Hours As Needed for Mild Pain.    Lancets (ACCU-CHEK SOFT TOUCH) lancets 1 each by Other route Daily.    metFORMIN (GLUCOPHAGE) 500 MG tablet Take 1 tablet by mouth Daily With Breakfast.     No current facility-administered medications on file prior to visit.     Allergies   Allergen Reactions    Contrast Dye (Echo Or Unknown Ct/Mr) Hives     Social History     Socioeconomic History    Marital status:    Tobacco Use    Smoking status: Former     Current packs/day: 0.00     Average packs/day: 1 pack/day for 29.0 years (29.0 ttl pk-yrs)     Types: Cigarettes     Start date:      Quit date:      Years since quittin.2     Passive exposure: Past    Smokeless tobacco: Never   Vaping Use    Vaping status: Never Used   Substance and Sexual Activity    Alcohol use: No    Drug use: No    Sexual activity: Yes     Partners: Male     Birth control/protection: Surgical     Family History   Problem Relation Age of Onset    Diabetes Mother     Hypertension Mother     Arthritis Mother     Bipolar disorder Mother     Stroke Father         age 55-56    Hypertension Father     Glaucoma Father     Bipolar disorder Sister     Diabetes Sister     Glaucoma Sister        Review of Systems    Objective   /73 (BP Location: Left arm, Patient Position: Sitting, Cuff Size: Large Adult)   Pulse 69   Temp 96.6 °F (35.9 °C) (Infrared)   Resp 18   Ht 165.1 cm (65\")   Wt 95.1 kg (209 lb 9.6 oz)   LMP  (LMP Unknown)   SpO2 95%   Breastfeeding No   BMI 34.88 kg/m²   Physical Exam  Constitutional:       Appearance: She is well-developed.      Comments:      HENT:      Head: Normocephalic and atraumatic.   Eyes:      Conjunctiva/sclera: Conjunctivae normal.   Cardiovascular:      Rate and Rhythm: Normal rate.   Pulmonary:      Effort: Pulmonary " effort is normal.      Breath sounds: Wheezing (Left upper - end expiratory) present.   Musculoskeletal:         General: Normal range of motion.      Cervical back: Normal range of motion.   Skin:     General: Skin is warm and dry.      Findings: No rash.   Neurological:      Mental Status: She is alert and oriented to person, place, and time.   Psychiatric:         Behavior: Behavior normal.           No visits with results within 4 Month(s) from this visit.   Latest known visit with results is:   Office Visit on 12/01/2023   Component Date Value Ref Range Status    Hemoglobin A1C 12/01/2023 6.3 (H)  4.8 - 5.6 % Final    Comment:          Prediabetes: 5.7 - 6.4           Diabetes: >6.4           Glycemic control for adults with diabetes: <7.0      Glucose 12/01/2023 95  70 - 99 mg/dL Final    BUN 12/01/2023 10  6 - 24 mg/dL Final    Creatinine 12/01/2023 0.79  0.57 - 1.00 mg/dL Final    EGFR Result 12/01/2023 91  >59 mL/min/1.73 Final    BUN/Creatinine Ratio 12/01/2023 13  9 - 23 Final    Sodium 12/01/2023 139  134 - 144 mmol/L Final    Potassium 12/01/2023 4.8  3.5 - 5.2 mmol/L Final    Chloride 12/01/2023 100  96 - 106 mmol/L Final    Total CO2 12/01/2023 24  20 - 29 mmol/L Final    Calcium 12/01/2023 9.7  8.7 - 10.2 mg/dL Final    Total Protein 12/01/2023 7.5  6.0 - 8.5 g/dL Final    Albumin 12/01/2023 4.7  3.9 - 4.9 g/dL Final    Globulin 12/01/2023 2.8  1.5 - 4.5 g/dL Final    A/G Ratio 12/01/2023 1.7  1.2 - 2.2 Final    Total Bilirubin 12/01/2023 0.5  0.0 - 1.2 mg/dL Final    Alkaline Phosphatase 12/01/2023 96  44 - 121 IU/L Final    AST (SGOT) 12/01/2023 13  0 - 40 IU/L Final    ALT (SGPT) 12/01/2023 19  0 - 32 IU/L Final    Total Cholesterol 12/01/2023 140  100 - 199 mg/dL Final    Triglycerides 12/01/2023 139  0 - 149 mg/dL Final    HDL Cholesterol 12/01/2023 36 (L)  >39 mg/dL Final    VLDL Cholesterol Myles 12/01/2023 25  5 - 40 mg/dL Final    LDL Chol Calc ([facility]) 12/01/2023 79  0 - 99 mg/dL Final    WBC  12/01/2023 11.8 (H)  3.4 - 10.8 x10E3/uL Final    RBC 12/01/2023 5.05  3.77 - 5.28 x10E6/uL Final    Hemoglobin 12/01/2023 14.0  11.1 - 15.9 g/dL Final    Hematocrit 12/01/2023 42.1  34.0 - 46.6 % Final    MCV 12/01/2023 83  79 - 97 fL Final    MCH 12/01/2023 27.7  26.6 - 33.0 pg Final    MCHC 12/01/2023 33.3  31.5 - 35.7 g/dL Final    RDW 12/01/2023 13.5  11.7 - 15.4 % Final    Platelets 12/01/2023 425  150 - 450 x10E3/uL Final    Neutrophil Rel % 12/01/2023 53  Not Estab. % Final    Lymphocyte Rel % 12/01/2023 41  Not Estab. % Final    Monocyte Rel % 12/01/2023 5  Not Estab. % Final    Eosinophil Rel % 12/01/2023 1  Not Estab. % Final    Basophil Rel % 12/01/2023 0  Not Estab. % Final    Neutrophils Absolute 12/01/2023 6.1  1.4 - 7.0 x10E3/uL Final    Lymphocytes Absolute 12/01/2023 4.9 (H)  0.7 - 3.1 x10E3/uL Final    Monocytes Absolute 12/01/2023 0.5  0.1 - 0.9 x10E3/uL Final    Eosinophils Absolute 12/01/2023 0.2  0.0 - 0.4 x10E3/uL Final    Basophils Absolute 12/01/2023 0.1  0.0 - 0.2 x10E3/uL Final    Immature Granulocyte Rel % 12/01/2023 0  Not Estab. % Final    Immature Grans Absolute 12/01/2023 0.0  0.0 - 0.1 x10E3/uL Final             Assessment & Plan   Diagnoses and all orders for this visit:    1. Type 2 diabetes mellitus without complication, without long-term current use of insulin (Primary)  -     Microalbumin / Creatinine Urine Ratio - Urine, Clean Catch; Future  -     Hemoglobin A1c; Future    2. Breast screening  -     Mammo Screening Digital Tomosynthesis Bilateral With CAD; Future    3. Encounter for screening mammogram for malignant neoplasm of breast    4. Bronchitis  -     azithromycin (Zithromax Z-Ryan) 250 MG tablet; Take 2 tablets by mouth on day 1, then 1 tablet daily on days 2-5  Dispense: 6 tablet; Refill: 0    Diabetes is a chronic problem which currently is under good control.  Continue Trulicity 4.5 mg/week and metformin 500 mg/day.  Check labs to monitor status of her diabetes as  above.  She is due for mammogram so I put in the order for that.  Will follow-up with her when the results of these tests are back.  Finally, she appears to have bronchitis.  Was treated with symptomatic medications at the urgent care, but symptoms have progressed and worsened.  Cough is now producing thick green phlegm.  She has some findings on exam as above.  Suspect atypical bronchitis.  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  Will treat her with a Z-Ryan as above.  Continue the Tessalon if it helps.  Also may consider Mucinex dm and lots of water for symptom treatment.  Let me know if this worsens instead of improves.  Follow-up here again in 3 months to check for diabetes or sooner if needed.        Call with any problems or concerns before next visit       Return in about 3 months (around 7/1/2024).      Much of this report is an electronic transcription of spoken language to printed text using Dragon dictation software.  As such, the subtleties and finesse of spoken language may permit erroneous, or at times, nonsensical words or phrases to be inadvertently transcribed; thus changes may be made at a later date to rectify these errors.     Jo Vasquez MD4/14/202409:51 EDT  This note has been electronically signed

## 2024-04-20 ENCOUNTER — HOSPITAL ENCOUNTER (OUTPATIENT)
Dept: MAMMOGRAPHY | Facility: HOSPITAL | Age: 49
Discharge: HOME | End: 2024-04-20
Admitting: FAMILY MEDICINE
Payer: COMMERCIAL

## 2024-04-20 DIAGNOSIS — Z12.39 BREAST SCREENING: ICD-10-CM

## 2024-04-20 PROCEDURE — 77067 SCR MAMMO BI INCL CAD: CPT

## 2024-04-20 PROCEDURE — 77063 BREAST TOMOSYNTHESIS BI: CPT

## 2024-04-22 DIAGNOSIS — N63.21 MASS OF UPPER OUTER QUADRANT OF LEFT BREAST: Primary | ICD-10-CM

## 2024-05-03 ENCOUNTER — HOSPITAL ENCOUNTER (OUTPATIENT)
Dept: ULTRASOUND IMAGING | Facility: HOSPITAL | Age: 49
Discharge: HOME | End: 2024-05-03
Payer: COMMERCIAL

## 2024-05-03 ENCOUNTER — HOSPITAL ENCOUNTER (OUTPATIENT)
Dept: MAMMOGRAPHY | Facility: HOSPITAL | Age: 49
Discharge: HOME | End: 2024-05-03
Payer: COMMERCIAL

## 2024-05-03 DIAGNOSIS — N63.21 MASS OF UPPER OUTER QUADRANT OF LEFT BREAST: ICD-10-CM

## 2024-05-03 PROCEDURE — 77065 DX MAMMO INCL CAD UNI: CPT

## 2024-05-03 PROCEDURE — G0279 TOMOSYNTHESIS, MAMMO: HCPCS

## 2024-05-03 PROCEDURE — 76642 ULTRASOUND BREAST LIMITED: CPT

## 2024-05-23 ENCOUNTER — HOSPITAL ENCOUNTER (OUTPATIENT)
Dept: ULTRASOUND IMAGING | Facility: HOSPITAL | Age: 49
Discharge: HOME | End: 2024-05-23
Payer: COMMERCIAL

## 2024-05-23 ENCOUNTER — HOSPITAL ENCOUNTER (OUTPATIENT)
Dept: MAMMOGRAPHY | Facility: HOSPITAL | Age: 49
Discharge: HOME | End: 2024-05-23
Payer: COMMERCIAL

## 2024-05-23 DIAGNOSIS — N63.20 MASS OF LEFT BREAST, UNSPECIFIED QUADRANT: ICD-10-CM

## 2024-05-23 DIAGNOSIS — N60.02 BREAST CYST, LEFT: ICD-10-CM

## 2024-05-23 PROCEDURE — A4648 IMPLANTABLE TISSUE MARKER: HCPCS

## 2024-05-23 PROCEDURE — 76942 ECHO GUIDE FOR BIOPSY: CPT

## 2024-05-23 PROCEDURE — 88305 TISSUE EXAM BY PATHOLOGIST: CPT | Performed by: FAMILY MEDICINE

## 2024-05-23 PROCEDURE — 25010000002 LIDOCAINE 1 % SOLUTION: Performed by: FAMILY MEDICINE

## 2024-05-23 RX ORDER — LIDOCAINE HYDROCHLORIDE 10 MG/ML
10 INJECTION, SOLUTION INFILTRATION; PERINEURAL ONCE
Status: COMPLETED | OUTPATIENT
Start: 2024-05-23 | End: 2024-05-23

## 2024-05-23 RX ORDER — LIDOCAINE HYDROCHLORIDE AND EPINEPHRINE BITARTRATE 20; .01 MG/ML; MG/ML
10 INJECTION, SOLUTION SUBCUTANEOUS ONCE
Status: COMPLETED | OUTPATIENT
Start: 2024-05-23 | End: 2024-05-23

## 2024-05-23 RX ADMIN — LIDOCAINE HYDROCHLORIDE AND EPINEPHRINE 10 ML: 20; 10 INJECTION, SOLUTION INFILTRATION; PERINEURAL at 14:11

## 2024-05-23 RX ADMIN — LIDOCAINE HYDROCHLORIDE 10 ML: 10 INJECTION, SOLUTION INFILTRATION; PERINEURAL at 14:11

## 2024-05-24 ENCOUNTER — TELEPHONE (OUTPATIENT)
Dept: MAMMOGRAPHY | Facility: HOSPITAL | Age: 49
End: 2024-05-24
Payer: COMMERCIAL

## 2024-05-24 LAB
LAB AP CASE REPORT: NORMAL
PATH REPORT.FINAL DX SPEC: NORMAL
PATH REPORT.GROSS SPEC: NORMAL

## 2024-05-24 NOTE — TELEPHONE ENCOUNTER
Maria E is not having any problems or sore. She has my contact information if she has questions or concerns.

## 2024-05-28 LAB
LAB AP CASE REPORT: NORMAL
LAB AP SPECIAL STAINS: NORMAL
PATH REPORT.FINAL DX SPEC: NORMAL
PATH REPORT.GROSS SPEC: NORMAL

## 2024-07-01 ENCOUNTER — OFFICE VISIT (OUTPATIENT)
Dept: FAMILY MEDICINE CLINIC | Facility: CLINIC | Age: 49
End: 2024-07-01
Payer: COMMERCIAL

## 2024-07-01 VITALS
WEIGHT: 203.6 LBS | RESPIRATION RATE: 18 BRPM | SYSTOLIC BLOOD PRESSURE: 111 MMHG | HEIGHT: 65 IN | BODY MASS INDEX: 33.92 KG/M2 | TEMPERATURE: 97.1 F | DIASTOLIC BLOOD PRESSURE: 73 MMHG | HEART RATE: 71 BPM | OXYGEN SATURATION: 93 %

## 2024-07-01 DIAGNOSIS — R92.8 ABNORMAL MAMMOGRAM: ICD-10-CM

## 2024-07-01 DIAGNOSIS — Z12.11 COLON CANCER SCREENING: ICD-10-CM

## 2024-07-01 DIAGNOSIS — D72.829 LEUKOCYTOSIS, UNSPECIFIED TYPE: ICD-10-CM

## 2024-07-01 DIAGNOSIS — E11.9 TYPE 2 DIABETES MELLITUS WITHOUT COMPLICATION, WITHOUT LONG-TERM CURRENT USE OF INSULIN: Primary | ICD-10-CM

## 2024-07-01 DIAGNOSIS — E78.2 MIXED HYPERLIPIDEMIA: ICD-10-CM

## 2024-07-01 PROCEDURE — 99214 OFFICE O/P EST MOD 30 MIN: CPT | Performed by: FAMILY MEDICINE

## 2024-07-01 NOTE — PROGRESS NOTES
Answers submitted by the patient for this visit:  Primary Reason for Visit (Submitted on 6/24/2024)  What is the primary reason for your visit?: Diabetes  Diabetes Questionnaire (Submitted on 6/24/2024)  Chief Complaint: Diabetes problem  Diabetes type: type 2  MedicAlert ID: No  Disease duration: 10 Years  Treatment compliance: all of the time  Home blood tests: 3-4 x per week  High score: 70-90  Below 70: occasionally  blurred vision: No  foot paresthesias: No  foot ulcerations: No  polydipsia: No  polyuria: No  weight loss: No  confusion: No  headaches: No  speech difficulty: No  sweats: No  tremors: No  Current diet: high fiber, low fat/cholesterol  Meal planning: carbohydrate counting, low carbohydrate diet  Exercise: rarely  Eye exam current: Yes  Sees podiatrist: No  Subjective   Maria E Barroso is a 48 y.o. female.   Chief Complaint   Patient presents with    Diabetes    Hyperlipidemia       History of Present Illness   48 y.o. female with PMH of HLD, DM2, chronic leukocytosis presents to the office today for follow-up.    Diabetes type 2-in addition to Trulicity 4.5 mg/week, metformin 500 mg/day she is doing a keto diet to try to lose weight.  Weight today is 203 pounds.  Weight at the last visit 3 months ago was 209 pounds.  Reports no symptoms of hypoglycemia or side effects of her medications.    HLD-continues on Lipitor 40 mg/day.Last lipid panel was 7 months ago and it was good.  Tolerating Lipitor without side effect such as myalgias.    She recently had an abnormal mammogram which required a biopsy.  That was benign, but this has happened to her twice.  She asks what we can do to minimize continuing to have recurrent biopsies.    Preventive health-she is due for colon cancer screening.  Agrees to do a Cologuard.            Patient Active Problem List    Diagnosis Date Noted    Mixed hyperlipidemia 09/29/2020    Class 1 obesity due to excess calories with serious comorbidity and body mass index (BMI)  of 33.0 to 33.9 in adult 10/14/2019    Onychomycosis of toenail 2016    Abnormal mammogram 10/14/2015    Leukocytosis 2014    Nonspecific abnormal results of function study of liver 2014    Type 2 diabetes mellitus 2014           Past Surgical History:   Procedure Laterality Date    HYSTERECTOMY      total     TIBIA FRACTURE SURGERY Left     plate with screws    US GUIDED CYST ASPIRATION BREAST N/A 2024     Current Outpatient Medications on File Prior to Visit   Medication Sig    atorvastatin (LIPITOR) 40 MG tablet Take 1 tablet by mouth every night at bedtime.    Blood Glucose Monitoring Suppl (ACCU-CHEK [place] PLUS) w/Device kit 1 kit by Other route Daily.    Dulaglutide (Trulicity) 4.5 MG/0.5ML solution pen-injector Inject 0.5 mL under the skin into the appropriate area as directed Every 7 (Seven) Days.    glucose blood (ACCU-CHEK [place] PLUS) test strip 1 each by Other route Daily.    ibuprofen (ADVIL,MOTRIN) 200 MG tablet Take 2 tablets by mouth Every 6 (Six) Hours As Needed for Mild Pain.    Lancets (ACCU-CHEK SOFT TOUCH) lancets 1 each by Other route Daily.    metFORMIN (GLUCOPHAGE) 500 MG tablet Take 1 tablet by mouth Daily With Breakfast.     No current facility-administered medications on file prior to visit.     Allergies   Allergen Reactions    Contrast Dye (Echo Or Unknown Ct/Mr) Hives     Social History     Socioeconomic History    Marital status:    Tobacco Use    Smoking status: Former     Current packs/day: 0.00     Average packs/day: 1 pack/day for 29.0 years (29.0 ttl pk-yrs)     Types: Cigarettes     Start date: 1987     Quit date: 2016     Years since quittin.5     Passive exposure: Past    Smokeless tobacco: Never   Vaping Use    Vaping status: Never Used   Substance and Sexual Activity    Alcohol use: No    Drug use: No    Sexual activity: Yes     Partners: Male     Birth control/protection: Surgical     Family History   Problem Relation Age of Onset  "   Diabetes Mother     Hypertension Mother     Arthritis Mother     Bipolar disorder Mother     Stroke Father         age 55-56    Hypertension Father     Glaucoma Father     Bipolar disorder Sister     Diabetes Sister     Glaucoma Sister        Review of Systems    Objective   /73 (BP Location: Right arm, Patient Position: Sitting, Cuff Size: Large Adult)   Pulse 71   Temp 97.1 °F (36.2 °C) (Infrared)   Resp 18   Ht 165.1 cm (65\")   Wt 92.4 kg (203 lb 9.6 oz)   LMP  (LMP Unknown)   SpO2 93%   Breastfeeding No   BMI 33.88 kg/m²   Physical Exam  Constitutional:       Appearance: She is well-developed.      Comments:      HENT:      Head: Normocephalic and atraumatic.   Eyes:      Conjunctiva/sclera: Conjunctivae normal.   Cardiovascular:      Rate and Rhythm: Normal rate.   Pulmonary:      Effort: Pulmonary effort is normal.   Musculoskeletal:         General: Normal range of motion.      Cervical back: Normal range of motion.   Skin:     General: Skin is warm and dry.      Findings: No rash.   Neurological:      Mental Status: She is alert and oriented to person, place, and time.   Psychiatric:         Behavior: Behavior normal.           Hospital Outpatient Visit on 05/23/2024   Component Date Value Ref Range Status    Case Report 05/23/2024    Final                    Value:Surgical Pathology Report                         Case: [id number]                                  Authorizing Provider:  Kalia Gonzalez MD       Collected:           05/23/2024 02:21 AM          Ordering Location:     [facility] Received:            05/23/2024 02:48 PM                                 SOUND                                                                        Pathologist:           Tulio Rebolledo MD                                                             Specimen:    Breast, Left, left breast 2:00 5 cmfn, 7 mm mass, in formalin @ 2:21p,5 passes, Dr Gonzalez                "                                                                   Final Diagnosis 05/23/2024    Final                    Value:This result contains rich text formatting which cannot be displayed here.    Gross Description 05/23/2024    Final                    Value:This result contains rich text formatting which cannot be displayed here.    Case Report 05/23/2024    Final                    Value:Medical Cytology Report                           Case: [id number]                                  Authorizing Provider:  Kalia Mcgee MD       Collected:           05/23/2024 02:24 PM          Ordering Location:     [facility] Received:            05/24/2024 10:52 AM                                 SOUND                                                                        Pathologist:           Korey Garcia MD                                                            Specimen:    Breast, Left, left breast 2:00 3 cmfn, 1.5 cm mass, Dr mcgee                           Final Diagnosis 05/23/2024    Final                    Value:This result contains rich text formatting which cannot be displayed here.    Gross Description 05/23/2024    Final                    Value:This result contains rich text formatting which cannot be displayed here.    Special Stains 05/23/2024    Final                    Value:This result contains rich text formatting which cannot be displayed here.   Results Encounter on 04/08/2024   Component Date Value Ref Range Status    Creatinine, Urine 04/08/2024 151.7  Not Estab. mg/dL Final    Microalbumin, Urine 04/08/2024 6.8  Not Estab. ug/mL Final    Microalbumin/Creatinine Ratio 04/08/2024 4  0 - 29 mg/g creat Final    Comment:                        Normal:                0 -  29                         Moderately increased: 30 - 300                         Severely increased:       >300      Hemoglobin A1C 04/08/2024 7.0 (H)  4.8 - 5.6 % Final    Comment:           Prediabetes: 5.7 - 6.4           Diabetes: >6.4           Glycemic control for adults with diabetes: <7.0               Assessment & Plan   Diagnoses and all orders for this visit:    1. Type 2 diabetes mellitus without complication, without long-term current use of insulin (Primary)  -     Hemoglobin A1c  -     CBC & Differential  -     Comprehensive Metabolic Panel    2. Colon cancer screening  -     Cologuard - Stool, Per Rectum; Future    3. Mixed hyperlipidemia    4. Abnormal mammogram    5. Leukocytosis, unspecified type    Maria E is in a chronic stable state.  Diabetes control has been good.  Last A1c 3 months ago was 7%.  We increased her Trulicity.  Get labs as above to monitor her diabetes.  I will follow-up with her when those tests are back.  Continue Trulicity and metformin at current doses for now.  Continue Lipitor to manage high cholesterol.  We briefly talked about her abnormal mammogram.  She has no family history of breast cancer.  She has had 2 negative biopsies.  I think we can use the guideline and follow her mammograms every 2 years to minimize this burden on her.  Will do a CBC to follow-up on chronic leukocytosis.      Call with any problems or concerns before next visit       Return in about 3 months (around 10/1/2024).      Much of this report is an electronic transcription of spoken language to printed text using Dragon dictation software.  As such, the subtleties and finesse of spoken language may permit erroneous, or at times, nonsensical words or phrases to be inadvertently transcribed; thus changes may be made at a later date to rectify these errors.     Jo Vasquez MD7/2/202420:45 EDT  This note has been electronically signed

## 2024-07-02 LAB
ALBUMIN SERPL-MCNC: 4.5 G/DL (ref 3.9–4.9)
ALP SERPL-CCNC: 102 IU/L (ref 44–121)
ALT SERPL-CCNC: 26 IU/L (ref 0–32)
AST SERPL-CCNC: 17 IU/L (ref 0–40)
BASOPHILS # BLD AUTO: 0.1 X10E3/UL (ref 0–0.2)
BASOPHILS NFR BLD AUTO: 0 %
BILIRUB SERPL-MCNC: 0.4 MG/DL (ref 0–1.2)
BUN SERPL-MCNC: 11 MG/DL (ref 6–24)
BUN/CREAT SERPL: 16 (ref 9–23)
CALCIUM SERPL-MCNC: 10.2 MG/DL (ref 8.7–10.2)
CHLORIDE SERPL-SCNC: 101 MMOL/L (ref 96–106)
CO2 SERPL-SCNC: 25 MMOL/L (ref 20–29)
CREAT SERPL-MCNC: 0.7 MG/DL (ref 0.57–1)
EGFRCR SERPLBLD CKD-EPI 2021: 105 ML/MIN/1.73
EOSINOPHIL # BLD AUTO: 0.3 X10E3/UL (ref 0–0.4)
EOSINOPHIL NFR BLD AUTO: 2 %
ERYTHROCYTE [DISTWIDTH] IN BLOOD BY AUTOMATED COUNT: 13.7 % (ref 11.7–15.4)
GLOBULIN SER CALC-MCNC: 2.9 G/DL (ref 1.5–4.5)
GLUCOSE SERPL-MCNC: 99 MG/DL (ref 70–99)
HBA1C MFR BLD: 6.9 % (ref 4.8–5.6)
HCT VFR BLD AUTO: 43.5 % (ref 34–46.6)
HGB BLD-MCNC: 13.9 G/DL (ref 11.1–15.9)
IMM GRANULOCYTES # BLD AUTO: 0 X10E3/UL (ref 0–0.1)
IMM GRANULOCYTES NFR BLD AUTO: 0 %
LYMPHOCYTES # BLD AUTO: 4.5 X10E3/UL (ref 0.7–3.1)
LYMPHOCYTES NFR BLD AUTO: 40 %
MCH RBC QN AUTO: 27.4 PG (ref 26.6–33)
MCHC RBC AUTO-ENTMCNC: 32 G/DL (ref 31.5–35.7)
MCV RBC AUTO: 86 FL (ref 79–97)
MONOCYTES # BLD AUTO: 0.5 X10E3/UL (ref 0.1–0.9)
MONOCYTES NFR BLD AUTO: 5 %
NEUTROPHILS # BLD AUTO: 5.8 X10E3/UL (ref 1.4–7)
NEUTROPHILS NFR BLD AUTO: 53 %
PLATELET # BLD AUTO: 380 X10E3/UL (ref 150–450)
POTASSIUM SERPL-SCNC: 4.9 MMOL/L (ref 3.5–5.2)
PROT SERPL-MCNC: 7.4 G/DL (ref 6–8.5)
RBC # BLD AUTO: 5.07 X10E6/UL (ref 3.77–5.28)
SODIUM SERPL-SCNC: 138 MMOL/L (ref 134–144)
WBC # BLD AUTO: 11.2 X10E3/UL (ref 3.4–10.8)

## 2024-09-20 DIAGNOSIS — E78.2 MIXED HYPERLIPIDEMIA: ICD-10-CM

## 2024-09-20 RX ORDER — ATORVASTATIN CALCIUM 40 MG/1
40 TABLET, FILM COATED ORAL
Qty: 90 TABLET | Refills: 3 | Status: SHIPPED | OUTPATIENT
Start: 2024-09-20

## 2024-10-14 ENCOUNTER — OFFICE VISIT (OUTPATIENT)
Dept: FAMILY MEDICINE CLINIC | Facility: CLINIC | Age: 49
End: 2024-10-14
Payer: COMMERCIAL

## 2024-10-14 VITALS
OXYGEN SATURATION: 95 % | HEART RATE: 81 BPM | HEIGHT: 65 IN | WEIGHT: 204 LBS | TEMPERATURE: 96.8 F | DIASTOLIC BLOOD PRESSURE: 70 MMHG | BODY MASS INDEX: 33.99 KG/M2 | SYSTOLIC BLOOD PRESSURE: 115 MMHG

## 2024-10-14 DIAGNOSIS — J30.1 SEASONAL ALLERGIC RHINITIS DUE TO POLLEN: ICD-10-CM

## 2024-10-14 DIAGNOSIS — E11.9 TYPE 2 DIABETES MELLITUS WITHOUT COMPLICATION, WITHOUT LONG-TERM CURRENT USE OF INSULIN: Primary | ICD-10-CM

## 2024-10-14 DIAGNOSIS — E78.2 MIXED HYPERLIPIDEMIA: ICD-10-CM

## 2024-10-14 DIAGNOSIS — Z23 NEED FOR INFLUENZA VACCINATION: ICD-10-CM

## 2024-10-14 PROCEDURE — 90656 IIV3 VACC NO PRSV 0.5 ML IM: CPT | Performed by: FAMILY MEDICINE

## 2024-10-14 PROCEDURE — 99214 OFFICE O/P EST MOD 30 MIN: CPT | Performed by: FAMILY MEDICINE

## 2024-10-14 PROCEDURE — 90471 IMMUNIZATION ADMIN: CPT | Performed by: FAMILY MEDICINE

## 2024-10-14 NOTE — PROGRESS NOTES
Subjective   Maria E Barroso is a 49 y.o. female.   Chief Complaint   Patient presents with    Diabetes    Hyperlipidemia       History of Present Illness   49 y.o. female with history of diabetes, high cholesterol presents to the office today to follow-up.  Last labs were 3 months ago.  At that time A1c was 6.9%.    Abnormal mammogram-occurred in May of this year.  Biopsy was negative.    Cologuard in September was negative.      History of Present Illness  The patient is a 49-year-old female who presents for evaluation of multiple medical concerns.    She reports a persistent cough, which is more pronounced at night and in the mornings when she experiences nasal congestion. She describes the need to cough forcefully to clear the congestion. She has not taken any medication for these symptoms. Additionally, she mentions occasional wheezing and a tickling sensation in her throat, which leads to a hoarse voice when she coughs. She reports no fever or exposure to campfire smoke. She maintains an active lifestyle.    She experienced a hypoglycemic episode at work, with her blood sugar dropping to around 50. This was likely due to insufficient carbohydrate intake as she has been following a low-carb keto diet. She managed to stabilize her blood sugar by eating. She has lost weight since starting Trulicity, going from 240 to 204 pounds, but struggles to lose further weight. She takes Trulicity every Monday and engages in daily physical activity.    SOCIAL HISTORY  She does not smoke.    IMMUNIZATIONS  She got her influenza vaccine.      Patient Active Problem List    Diagnosis Date Noted    Mixed hyperlipidemia 09/29/2020    Class 1 obesity due to excess calories with serious comorbidity and body mass index (BMI) of 33.0 to 33.9 in adult 10/14/2019    Onychomycosis of toenail 04/01/2016    Abnormal mammogram 10/14/2015    Leukocytosis 08/06/2014    Nonspecific abnormal results of function study of liver 07/03/2014     Type 2 diabetes mellitus 2014           Past Surgical History:   Procedure Laterality Date    HYSTERECTOMY      total     TIBIA FRACTURE SURGERY Left     plate with screws    US GUIDED CYST ASPIRATION BREAST N/A 2024     Current Outpatient Medications on File Prior to Visit   Medication Sig    atorvastatin (LIPITOR) 40 MG tablet TAKE 1 TABLET BY MOUTH EVERYDAY AT BEDTIME    Blood Glucose Monitoring Suppl (ACCU-CHEK [place] PLUS) w/Device kit 1 kit by Other route Daily.    Dulaglutide (Trulicity) 4.5 MG/0.5ML solution pen-injector Inject 0.5 mL under the skin into the appropriate area as directed Every 7 (Seven) Days.    glucose blood (ACCU-CHEK [place] PLUS) test strip 1 each by Other route Daily.    ibuprofen (ADVIL,MOTRIN) 200 MG tablet Take 2 tablets by mouth Every 6 (Six) Hours As Needed for Mild Pain.    Lancets (ACCU-CHEK SOFT TOUCH) lancets 1 each by Other route Daily.    metFORMIN (GLUCOPHAGE) 500 MG tablet Take 1 tablet by mouth Daily With Breakfast.     No current facility-administered medications on file prior to visit.     Allergies   Allergen Reactions    Contrast Dye (Echo Or Unknown Ct/Mr) Hives     Social History     Socioeconomic History    Marital status:    Tobacco Use    Smoking status: Former     Current packs/day: 0.00     Average packs/day: 1 pack/day for 29.0 years (29.0 ttl pk-yrs)     Types: Cigarettes     Start date: 1987     Quit date: 2016     Years since quittin.7     Passive exposure: Past    Smokeless tobacco: Never   Vaping Use    Vaping status: Never Used   Substance and Sexual Activity    Alcohol use: No    Drug use: No    Sexual activity: Yes     Partners: Male     Birth control/protection: Surgical     Family History   Problem Relation Age of Onset    Diabetes Mother     Hypertension Mother     Arthritis Mother     Bipolar disorder Mother     Stroke Father         age 55-56    Hypertension Father     Glaucoma Father     Bipolar disorder Sister     Diabetes  "Sister     Glaucoma Sister        Review of Systems    Objective   /70 (BP Location: Right arm, Patient Position: Sitting, Cuff Size: Adult)   Pulse 81   Temp 96.8 °F (36 °C) (Infrared)   Ht 165.1 cm (65\")   Wt 92.5 kg (204 lb)   LMP  (LMP Unknown)   SpO2 95%   BMI 33.95 kg/m²   Physical Exam  Constitutional:       Appearance: She is well-developed. She is not toxic-appearing.      Comments:      HENT:      Head: Normocephalic and atraumatic.   Eyes:      Conjunctiva/sclera: Conjunctivae normal.   Cardiovascular:      Rate and Rhythm: Normal rate and regular rhythm.      Heart sounds: No murmur heard.  Pulmonary:      Effort: Pulmonary effort is normal. No respiratory distress.      Breath sounds: Normal breath sounds.   Musculoskeletal:         General: Normal range of motion.      Cervical back: Normal range of motion.      Right lower leg: No edema.      Left lower leg: No edema.   Skin:     General: Skin is warm and dry.      Findings: No rash.   Neurological:      Mental Status: She is alert and oriented to person, place, and time.   Psychiatric:         Behavior: Behavior normal.       Physical Exam  Clear lungs.  Normal heart rhythm.      Results Encounter on 07/01/2024   Component Date Value Ref Range Status    Cologuard 07/20/2024 Sample Could Not Be Processed 7  N/A Final    The sample stability limit had been exceeded. The patient will be contacted to initiate a new sample collection.    Cologuard 09/14/2024 Negative  Negative Final    Comment:   NEGATIVE TEST RESULT. A negative Cologuard result indicates a low likelihood that a colorectal cancer (CRC) or advanced adenoma (adenomatous polyps with more advanced pre-malignant features)  is present. The chance that a person with a negative Cologuard test has a colorectal cancer is less than 1 in 1500 (negative predictive value >99.9%) or has an  advanced adenoma is less than  5.3% (negative predictive value 94.7%). These data are based on a " prospective cross-sectional study of 10,000 individuals at average risk for colorectal cancer who were screened with both Cologuard and colonoscopy. (Kevin Yung al, N Engl J Med 2014;370(14):7734-6441) The normal value (reference range) for this assay is negative.    COLOGUARD RE-SCREENING RECOMMENDATION: Periodic colorectal cancer screening is an important part of preventive healthcare for asymptomatic individuals at average risk for colorectal cancer.  Following a negative Cologuard result, the American Cancer Society and U.S.                            Multi-Society Task Force screening guidelines recommend a Cologuard re-screening interval of 3 years.   References: American Cancer Society Guideline for Colorectal Cancer Screening: https://www.cancer.org/cancer/colon-rectal-cancer/ninkkknkl-mcewmnirh-ffgdotu/acs-recommendations.html.; Simone PANIAGUA, Sun LAW, Hansel PATTONK, Colorectal Cancer Screening: Recommendations for Physicians and Patients from the U.S. Multi-Society Task Force on Colorectal Cancer Screening , Am J Gastroenterology 2017; 112:3191-1484.    TEST DESCRIPTION: Composite algorithmic analysis of stool DNA-biomarkers with hemoglobin immunoassay.   Quantitative values of individual biomarkers are not reportable and are not associated with individual biomarker result reference ranges. Cologuard is intended for colorectal cancer screening of adults of either sex, 45 years or older, who are at average-risk for colorectal cancer (CRC). Cologuard has been approved for use by the U.S. FDA. The performance of Cologuard was                            established in a cross sectional study of average-risk adults aged 50-84. Cologuard performance in patients ages 45 to 49 years was estimated by sub-group analysis of near-age groups. Colonoscopies performed for a positive result may find as the most clinically significant lesion: colorectal cancer [4.0%], advanced adenoma (including sessile serrated polyps  greater than or equal to 1cm diameter) [20%] or non- advanced adenoma [31%]; or no colorectal neoplasia [45%]. These estimates are derived from a prospective cross-sectional screening study of 10,000 individuals at average risk for colorectal cancer who were screened with both Cologuard and colonoscopy. (Kevin Yung al, N Engl J Med 2014;370(14):9460-3203.) Cologuard may produce a false negative or false positive result (no colorectal cancer or precancerous polyp present at colonoscopy follow up). A negative Cologuard test result does not guarantee the absence of CRC or advanced adenoma (pre-cancer). The current Cologuard                            screening interval is every 3 years. (American Cancer Society and U.S. Multi-Society Task Force). Cologuard performance data in a 10,000 patient pivotal study using colonoscopy as the reference method can be accessed at the following location: www.[employer]/results. Additional description of the Cologuard test process, warnings and precautions can be found at www.cologBux180rd.com.     Office Visit on 07/01/2024   Component Date Value Ref Range Status    Hemoglobin A1C 07/01/2024 6.9 (H)  4.8 - 5.6 % Final    Comment:          Prediabetes: 5.7 - 6.4           Diabetes: >6.4           Glycemic control for adults with diabetes: <7.0      WBC 07/01/2024 11.2 (H)  3.4 - 10.8 x10E3/uL Final    RBC 07/01/2024 5.07  3.77 - 5.28 x10E6/uL Final    Hemoglobin 07/01/2024 13.9  11.1 - 15.9 g/dL Final    Hematocrit 07/01/2024 43.5  34.0 - 46.6 % Final    MCV 07/01/2024 86  79 - 97 fL Final    MCH 07/01/2024 27.4  26.6 - 33.0 pg Final    MCHC 07/01/2024 32.0  31.5 - 35.7 g/dL Final    RDW 07/01/2024 13.7  11.7 - 15.4 % Final    Platelets 07/01/2024 380  150 - 450 x10E3/uL Final    Neutrophil Rel % 07/01/2024 53  Not Estab. % Final    Lymphocyte Rel % 07/01/2024 40  Not Estab. % Final    Monocyte Rel % 07/01/2024 5  Not Estab. % Final    Eosinophil Rel % 07/01/2024 2  Not Estab. %  Final    Basophil Rel % 07/01/2024 0  Not Estab. % Final    Neutrophils Absolute 07/01/2024 5.8  1.4 - 7.0 x10E3/uL Final    Lymphocytes Absolute 07/01/2024 4.5 (H)  0.7 - 3.1 x10E3/uL Final    Monocytes Absolute 07/01/2024 0.5  0.1 - 0.9 x10E3/uL Final    Eosinophils Absolute 07/01/2024 0.3  0.0 - 0.4 x10E3/uL Final    Basophils Absolute 07/01/2024 0.1  0.0 - 0.2 x10E3/uL Final    Immature Granulocyte Rel % 07/01/2024 0  Not Estab. % Final    Immature Grans Absolute 07/01/2024 0.0  0.0 - 0.1 x10E3/uL Final    Glucose 07/01/2024 99  70 - 99 mg/dL Final    BUN 07/01/2024 11  6 - 24 mg/dL Final    Creatinine 07/01/2024 0.70  0.57 - 1.00 mg/dL Final    EGFR Result 07/01/2024 105  >59 mL/min/1.73 Final    BUN/Creatinine Ratio 07/01/2024 16  9 - 23 Final    Sodium 07/01/2024 138  134 - 144 mmol/L Final    Potassium 07/01/2024 4.9  3.5 - 5.2 mmol/L Final    Chloride 07/01/2024 101  96 - 106 mmol/L Final    Total CO2 07/01/2024 25  20 - 29 mmol/L Final    Calcium 07/01/2024 10.2  8.7 - 10.2 mg/dL Final    Total Protein 07/01/2024 7.4  6.0 - 8.5 g/dL Final    Albumin 07/01/2024 4.5  3.9 - 4.9 g/dL Final    Globulin 07/01/2024 2.9  1.5 - 4.5 g/dL Final    Total Bilirubin 07/01/2024 0.4  0.0 - 1.2 mg/dL Final    Alkaline Phosphatase 07/01/2024 102  44 - 121 IU/L Final    AST (SGOT) 07/01/2024 17  0 - 40 IU/L Final    ALT (SGPT) 07/01/2024 26  0 - 32 IU/L Final     Results            Assessment & Plan   Diagnoses and all orders for this visit:    1. Type 2 diabetes mellitus without complication, without long-term current use of insulin (Primary)  -     Hemoglobin A1c  -     Comprehensive Metabolic Panel  -     CBC & Differential    2. Mixed hyperlipidemia  -     Lipid Panel    3. Seasonal allergic rhinitis due to pollen    4. Need for influenza vaccination  -     Fluzone >6mos      Assessment & Plan  1. Cough.  Her symptoms suggest a drainage pattern rather than bronchitis, indicating a possible case of allergic rhinitis. A  nasal steroid such as Flonase or Nasacort was recommended to reduce mucus production. Additionally, Mucinex was suggested to thin the mucus, to be taken in the afternoon. She was advised to avoid antihistamines like Allegra, Claritin, Zyrtec, and Xyzal as they can thicken mucus and potentially exacerbate her condition.    2. Diabetes Mellitus.  Her hypoglycemic episode was likely due to inadequate food intake, as neither metformin nor Trulicity are known to cause significant drops in blood sugar levels. She was advised to reduce her calorie intake, particularly from sodas. The addition of over-the-counter Hydroxycut was suggested to aid in weight loss as she still wants to lose another 5 to 10 pounds. Blood work, including A1c, cholesterol, metabolic panel, and CBC, will be conducted.    I will follow-up with her when her blood tests are back.      Call with any problems or concerns before next visit       Return in about 3 months (around 1/14/2025).  Patient or patient representative verbalized consent for the use of Ambient Listening during the visit with  Jo Vasquez MD for chart documentation. 10/14/2024  10:01 EDT    Much of this report is an electronic transcription of spoken language to printed text using Dragon dictation software.  As such, the subtleties and finesse of spoken language may permit erroneous, or at times, nonsensical words or phrases to be inadvertently transcribed; thus changes may be made at a later date to rectify these errors.     Jo Vasquez MD10/14/824683:00 EDT  This note has been electronically signed

## 2024-10-15 LAB
ALBUMIN SERPL-MCNC: 4.3 G/DL (ref 3.8–4.9)
ALP SERPL-CCNC: 104 IU/L (ref 44–121)
ALT SERPL-CCNC: 21 IU/L (ref 0–32)
AST SERPL-CCNC: 13 IU/L (ref 0–40)
BASOPHILS # BLD AUTO: 0.1 X10E3/UL (ref 0–0.2)
BASOPHILS NFR BLD AUTO: 1 %
BILIRUB SERPL-MCNC: 0.3 MG/DL (ref 0–1.2)
BUN SERPL-MCNC: 12 MG/DL (ref 6–24)
BUN/CREAT SERPL: 13 (ref 9–23)
CALCIUM SERPL-MCNC: 9.6 MG/DL (ref 8.7–10.2)
CHLORIDE SERPL-SCNC: 101 MMOL/L (ref 96–106)
CHOLEST SERPL-MCNC: 137 MG/DL (ref 100–199)
CO2 SERPL-SCNC: 25 MMOL/L (ref 20–29)
CREAT SERPL-MCNC: 0.94 MG/DL (ref 0.57–1)
EGFRCR SERPLBLD CKD-EPI 2021: 73 ML/MIN/1.73
EOSINOPHIL # BLD AUTO: 0.2 X10E3/UL (ref 0–0.4)
EOSINOPHIL NFR BLD AUTO: 2 %
ERYTHROCYTE [DISTWIDTH] IN BLOOD BY AUTOMATED COUNT: 13.6 % (ref 11.7–15.4)
GLOBULIN SER CALC-MCNC: 2.7 G/DL (ref 1.5–4.5)
GLUCOSE SERPL-MCNC: 114 MG/DL (ref 70–99)
HBA1C MFR BLD: 6.9 % (ref 4.8–5.6)
HCT VFR BLD AUTO: 44.3 % (ref 34–46.6)
HDLC SERPL-MCNC: 34 MG/DL
HGB BLD-MCNC: 14.2 G/DL (ref 11.1–15.9)
IMM GRANULOCYTES # BLD AUTO: 0 X10E3/UL (ref 0–0.1)
IMM GRANULOCYTES NFR BLD AUTO: 0 %
LDLC SERPL CALC-MCNC: 71 MG/DL (ref 0–99)
LYMPHOCYTES # BLD AUTO: 4.2 X10E3/UL (ref 0.7–3.1)
LYMPHOCYTES NFR BLD AUTO: 39 %
MCH RBC QN AUTO: 27.9 PG (ref 26.6–33)
MCHC RBC AUTO-ENTMCNC: 32.1 G/DL (ref 31.5–35.7)
MCV RBC AUTO: 87 FL (ref 79–97)
MONOCYTES # BLD AUTO: 0.5 X10E3/UL (ref 0.1–0.9)
MONOCYTES NFR BLD AUTO: 5 %
NEUTROPHILS # BLD AUTO: 5.8 X10E3/UL (ref 1.4–7)
NEUTROPHILS NFR BLD AUTO: 53 %
PLATELET # BLD AUTO: 356 X10E3/UL (ref 150–450)
POTASSIUM SERPL-SCNC: 4.9 MMOL/L (ref 3.5–5.2)
PROT SERPL-MCNC: 7 G/DL (ref 6–8.5)
RBC # BLD AUTO: 5.09 X10E6/UL (ref 3.77–5.28)
SODIUM SERPL-SCNC: 139 MMOL/L (ref 134–144)
TRIGL SERPL-MCNC: 193 MG/DL (ref 0–149)
VLDLC SERPL CALC-MCNC: 32 MG/DL (ref 5–40)
WBC # BLD AUTO: 10.8 X10E3/UL (ref 3.4–10.8)

## 2024-12-18 DIAGNOSIS — E11.9 TYPE 2 DIABETES MELLITUS WITHOUT COMPLICATION, WITHOUT LONG-TERM CURRENT USE OF INSULIN: ICD-10-CM

## 2025-01-14 ENCOUNTER — OFFICE VISIT (OUTPATIENT)
Dept: FAMILY MEDICINE CLINIC | Facility: CLINIC | Age: 50
End: 2025-01-14
Payer: COMMERCIAL

## 2025-01-14 VITALS
TEMPERATURE: 97.3 F | DIASTOLIC BLOOD PRESSURE: 70 MMHG | BODY MASS INDEX: 32.69 KG/M2 | RESPIRATION RATE: 18 BRPM | OXYGEN SATURATION: 90 % | SYSTOLIC BLOOD PRESSURE: 114 MMHG | HEIGHT: 65 IN | WEIGHT: 196.2 LBS | HEART RATE: 79 BPM

## 2025-01-14 DIAGNOSIS — E78.2 MIXED HYPERLIPIDEMIA: ICD-10-CM

## 2025-01-14 DIAGNOSIS — J18.9 COMMUNITY ACQUIRED PNEUMONIA, UNSPECIFIED LATERALITY: Primary | ICD-10-CM

## 2025-01-14 DIAGNOSIS — E11.9 TYPE 2 DIABETES MELLITUS WITHOUT COMPLICATION, WITHOUT LONG-TERM CURRENT USE OF INSULIN: ICD-10-CM

## 2025-01-14 PROCEDURE — 99214 OFFICE O/P EST MOD 30 MIN: CPT | Performed by: FAMILY MEDICINE

## 2025-01-14 RX ORDER — DOXYCYCLINE 100 MG/1
100 CAPSULE ORAL 2 TIMES DAILY
Qty: 14 CAPSULE | Refills: 0 | Status: SHIPPED | OUTPATIENT
Start: 2025-01-14 | End: 2025-01-21

## 2025-01-14 RX ORDER — DULAGLUTIDE 4.5 MG/.5ML
4.5 INJECTION, SOLUTION SUBCUTANEOUS
Qty: 6 ML | Refills: 3 | Status: SHIPPED | OUTPATIENT
Start: 2025-01-14

## 2025-01-14 RX ORDER — ALBUTEROL SULFATE 90 UG/1
2 INHALANT RESPIRATORY (INHALATION) EVERY 4 HOURS PRN
Qty: 8 G | Refills: 2 | Status: SHIPPED | OUTPATIENT
Start: 2025-01-14

## 2025-01-14 RX ORDER — CEFDINIR 300 MG/1
300 CAPSULE ORAL 2 TIMES DAILY
Qty: 14 CAPSULE | Refills: 0 | Status: SHIPPED | OUTPATIENT
Start: 2025-01-14 | End: 2025-01-21

## 2025-01-14 RX ORDER — PREDNISONE 20 MG/1
TABLET ORAL
Qty: 19 TABLET | Refills: 0 | Status: SHIPPED | OUTPATIENT
Start: 2025-01-14

## 2025-01-14 NOTE — PROGRESS NOTES
Answers submitted by the patient for this visit:  Primary Reason for Visit (Submitted on 1/7/2025)  What is the primary reason for your visit?: Diabetes  Diabetes Questionnaire (Submitted on 1/7/2025)  Chief Complaint: Diabetes problem  Diabetes type: type 2  MedicAlert ID: No  Disease duration: 11 Years  Treatment compliance: all of the time  Symptom course: stable  Home blood tests: 1-2 x per week  High score:   Below 70: occasionally  blurred vision: No  foot paresthesias: No  foot ulcerations: No  polydipsia: No  polyuria: No  weight loss: No  confusion: No  headaches: No  speech difficulty: No  sweats: No  tremors: No  Current diet: diabetic  Meal planning: low carbohydrate diet  Exercise: intermittently  Eye exam current: Yes  Sees podiatrist: No  Subjective   Maria E Barroso is a 49 y.o. female.   Chief Complaint   Patient presents with    Diabetes    Hyperlipidemia       History of Present Illness   49 y.o. female with past medical history of type 2 diabetes, HLD presents to the office today to follow-up.  I last saw her 3 months ago.  Her A1c at that time was 6.9%.  She was in the emergency room a week ago with a lower respiratory infection.  She had increased interstitial markings.  They did not clearly call pneumonia, though.  History of Present Illness  The patient presents for evaluation of a persistent cough.    She was previously evaluated at an urgent care facility on [address] [place] [address], where she underwent a chest x-ray, received a breathing treatment, and was prescribed an inhaler, Tessalon Perles, and a Z-Ryan. Despite these interventions, her symptoms have not improved. She reports that her condition deteriorated on Monday, with significant impairment in her ability to function at work on Tuesday. Her symptoms include headache, sore throat, nasal congestion, cough, and difficulty breathing. The cough is severe enough to disrupt her sleep and work, and she experiences shortness of breath during  physical exertion at work. She also reports a sensation of chest tightness. She has been tested for RSV, strep, influenza, and COVID-19. She expresses concern about the potential progression of her condition to pneumonia. She has been using albuterol 2 puffs 3 to 4 times daily and has attempted self-care measures such as percussions and application of Vicks on her feet before bedtime. She has Mucinex DM at home and has been maintaining adequate hydration. She has also tried guaifenesin.    She has been adhering to a ketogenic diet and has lost approximately 10 pounds, bringing her weight below 200 pounds. She does not consume sugary beverages. She is currently out of her Trulicity medication and did not administer her usual dose on Monday.    ALLERGIES  The patient is allergic to CONTRAST.    MEDICATIONS  Current: albuterol, Trulicity  Past: azithromycin      Patient Active Problem List    Diagnosis Date Noted    Mixed hyperlipidemia 09/29/2020    Class 1 obesity due to excess calories with serious comorbidity and body mass index (BMI) of 33.0 to 33.9 in adult 10/14/2019    Onychomycosis of toenail 04/01/2016    Abnormal mammogram 10/14/2015    Leukocytosis 08/06/2014    Nonspecific abnormal results of function study of liver 07/03/2014    Type 2 diabetes mellitus 05/07/2014           Past Surgical History:   Procedure Laterality Date    HYSTERECTOMY      total     TIBIA FRACTURE SURGERY Left     plate with screws    US GUIDED CYST ASPIRATION BREAST N/A 5/23/2024     Current Outpatient Medications on File Prior to Visit   Medication Sig    atorvastatin (LIPITOR) 40 MG tablet TAKE 1 TABLET BY MOUTH EVERYDAY AT BEDTIME    Blood Glucose Monitoring Suppl (ACCU-CHEK [place] PLUS) w/Device kit 1 kit by Other route Daily.    glucose blood (ACCU-CHEK [place] PLUS) test strip 1 each by Other route Daily.    ibuprofen (ADVIL,MOTRIN) 200 MG tablet Take 2 tablets by mouth Every 6 (Six) Hours As Needed for Mild Pain.    Lancets  "(ACCU-CHEK SOFT TOUCH) lancets 1 each by Other route Daily.    metFORMIN (GLUCOPHAGE) 500 MG tablet TAKE 1 TABLET BY MOUTH EVERY DAY WITH BREAKFAST     No current facility-administered medications on file prior to visit.     Allergies   Allergen Reactions    Contrast Dye (Echo Or Unknown Ct/Mr) Hives     Social History     Socioeconomic History    Marital status:    Tobacco Use    Smoking status: Former     Current packs/day: 0.00     Average packs/day: 1 pack/day for 29.0 years (29.0 ttl pk-yrs)     Types: Cigarettes     Start date: 1987     Quit date: 2016     Years since quittin.0     Passive exposure: Past    Smokeless tobacco: Never   Vaping Use    Vaping status: Never Used   Substance and Sexual Activity    Alcohol use: No    Drug use: No    Sexual activity: Not Currently     Partners: Male     Birth control/protection: Hysterectomy, Surgical     Family History   Problem Relation Age of Onset    Diabetes Mother     Hypertension Mother     Arthritis Mother     Bipolar disorder Mother     Heart disease Mother     Mental illness Mother     Stroke Father         age 55-56    Hypertension Father     Glaucoma Father     Hyperlipidemia Father     Bipolar disorder Sister     Diabetes Sister     Glaucoma Sister     Mental illness Sister        Review of Systems    Objective   /70 (BP Location: Right arm, Patient Position: Sitting, Cuff Size: Large Adult)   Pulse 79   Temp 97.3 °F (36.3 °C) (Infrared)   Resp 18   Ht 165.1 cm (65\")   Wt 89 kg (196 lb 3.2 oz)   LMP  (LMP Unknown)   SpO2 90%   Breastfeeding No   BMI 32.65 kg/m²   Physical Exam  Constitutional:       Appearance: She is well-developed. She is ill-appearing. She is not toxic-appearing.      Comments:      HENT:      Head: Normocephalic and atraumatic.   Eyes:      Conjunctiva/sclera: Conjunctivae normal.   Cardiovascular:      Rate and Rhythm: Normal rate and regular rhythm.      Heart sounds: No murmur heard.  Pulmonary:      " Effort: Pulmonary effort is normal.      Breath sounds: Wheezing and rhonchi present.      Comments: Coughs frequently during the visit.  Deep breath provokes a cough.  Musculoskeletal:         General: Normal range of motion.      Cervical back: Normal range of motion.      Right lower leg: No edema.      Left lower leg: No edema.   Skin:     General: Skin is warm and dry.      Findings: No rash.      Comments: Not clammy or diaphoretic   Neurological:      Mental Status: She is alert and oriented to person, place, and time.      Gait: Gait normal.   Psychiatric:         Behavior: Behavior normal.       Physical Exam  Rhonchi and wheezing noted in the lungs.      Admission on 01/07/2025, Discharged on 01/07/2025   Component Date Value Ref Range Status    COVID19 01/07/2025 Not Detected  Not Detected - Ref. Range Final    Influenza A PCR 01/07/2025 Not Detected  Not Detected Final    Influenza B PCR 01/07/2025 Not Detected  Not Detected Final    STREP A PCR 01/07/2025 Not Detected  Not Detected Final    RSV, PCR 01/07/2025 Not Detected  Not Detected Final   Office Visit on 10/14/2024   Component Date Value Ref Range Status    Hemoglobin A1C 10/14/2024 6.9 (H)  4.8 - 5.6 % Final    Comment:          Prediabetes: 5.7 - 6.4           Diabetes: >6.4           Glycemic control for adults with diabetes: <7.0      Total Cholesterol 10/14/2024 137  100 - 199 mg/dL Final    Triglycerides 10/14/2024 193 (H)  0 - 149 mg/dL Final    HDL Cholesterol 10/14/2024 34 (L)  >39 mg/dL Final    VLDL Cholesterol Myles 10/14/2024 32  5 - 40 mg/dL Final    LDL Chol Calc (NIH) 10/14/2024 71  0 - 99 mg/dL Final    Glucose 10/14/2024 114 (H)  70 - 99 mg/dL Final    BUN 10/14/2024 12  6 - 24 mg/dL Final    Creatinine 10/14/2024 0.94  0.57 - 1.00 mg/dL Final    EGFR Result 10/14/2024 73  >59 mL/min/1.73 Final    BUN/Creatinine Ratio 10/14/2024 13  9 - 23 Final    Sodium 10/14/2024 139  134 - 144 mmol/L Final    Potassium 10/14/2024 4.9  3.5 - 5.2  mmol/L Final    Chloride 10/14/2024 101  96 - 106 mmol/L Final    Total CO2 10/14/2024 25  20 - 29 mmol/L Final    Calcium 10/14/2024 9.6  8.7 - 10.2 mg/dL Final    Total Protein 10/14/2024 7.0  6.0 - 8.5 g/dL Final    Albumin 10/14/2024 4.3  3.8 - 4.9 g/dL Final    Globulin 10/14/2024 2.7  1.5 - 4.5 g/dL Final    Total Bilirubin 10/14/2024 0.3  0.0 - 1.2 mg/dL Final    Alkaline Phosphatase 10/14/2024 104  44 - 121 IU/L Final    AST (SGOT) 10/14/2024 13  0 - 40 IU/L Final    ALT (SGPT) 10/14/2024 21  0 - 32 IU/L Final    WBC 10/14/2024 10.8  3.4 - 10.8 x10E3/uL Final    RBC 10/14/2024 5.09  3.77 - 5.28 x10E6/uL Final    Hemoglobin 10/14/2024 14.2  11.1 - 15.9 g/dL Final    Hematocrit 10/14/2024 44.3  34.0 - 46.6 % Final    MCV 10/14/2024 87  79 - 97 fL Final    MCH 10/14/2024 27.9  26.6 - 33.0 pg Final    MCHC 10/14/2024 32.1  31.5 - 35.7 g/dL Final    RDW 10/14/2024 13.6  11.7 - 15.4 % Final    Platelets 10/14/2024 356  150 - 450 x10E3/uL Final    Neutrophil Rel % 10/14/2024 53  Not Estab. % Final    Lymphocyte Rel % 10/14/2024 39  Not Estab. % Final    Monocyte Rel % 10/14/2024 5  Not Estab. % Final    Eosinophil Rel % 10/14/2024 2  Not Estab. % Final    Basophil Rel % 10/14/2024 1  Not Estab. % Final    Neutrophils Absolute 10/14/2024 5.8  1.4 - 7.0 x10E3/uL Final    Lymphocytes Absolute 10/14/2024 4.2 (H)  0.7 - 3.1 x10E3/uL Final    Monocytes Absolute 10/14/2024 0.5  0.1 - 0.9 x10E3/uL Final    Eosinophils Absolute 10/14/2024 0.2  0.0 - 0.4 x10E3/uL Final    Basophils Absolute 10/14/2024 0.1  0.0 - 0.2 x10E3/uL Final    Immature Granulocyte Rel % 10/14/2024 0  Not Estab. % Final    Immature Grans Absolute 10/14/2024 0.0  0.0 - 0.1 x10E3/uL Final     Results  Imaging  Chest x-ray showed chronic changes that might be acute.         Assessment & Plan   Diagnoses and all orders for this visit:    1. Community acquired pneumonia, unspecified laterality (Primary)  -     predniSONE (DELTASONE) 20 MG tablet; 3 per day  x 3 days. 2 per day x 3 days, 1 per day x 2 days, then 1/2 per day x 4 day  Dispense: 19 tablet; Refill: 0  -     cefdinir (OMNICEF) 300 MG capsule; Take 1 capsule by mouth 2 (Two) Times a Day for 7 days.  Dispense: 14 capsule; Refill: 0  -     doxycycline (VIBRAMYCIN) 100 MG capsule; Take 1 capsule by mouth 2 (Two) Times a Day for 7 days.  Dispense: 14 capsule; Refill: 0  -     XR Chest PA & Lateral    2. Type 2 diabetes mellitus without complication, without long-term current use of insulin  -     Hemoglobin A1c  -     Comprehensive Metabolic Panel  -     Magnesium  -     Dulaglutide (Trulicity) 4.5 MG/0.5ML solution auto-injector; Inject 4.5 mg under the skin into the appropriate area as directed Every 7 (Seven) Days.  Dispense: 6 mL; Refill: 3    3. Mixed hyperlipidemia    Other orders  -     albuterol sulfate  (90 Base) MCG/ACT inhaler; Inhale 2 puffs Every 4 (Four) Hours As Needed for Wheezing or Shortness of Air.  Dispense: 8 g; Refill: 2      Assessment & Plan  1. Suspected pneumonia.  The patient presents with persistent symptoms including headache, sore throat, nasal congestion, coughing, and difficulty breathing, which have not improved despite previous treatment with a Z-Ryan and inhaler. Physical examination reveals significant rhonchi and wheezing, indicating bronchial tube reactions and mucus accumulation. A repeat chest x-ray will be conducted today to compare with the previous one. She will be treated as if this is pneumonia, which includes two antibiotics, Omnicef (cefdinir) and doxycycline. She is advised to continue using her albuterol inhaler 2 puffs 3-4 times a day and start Mucinex DM to help break up mucus. Prednisone will be prescribed instead of a 4 mg DexPak. She is encouraged to maintain high water intake to ensure the effectiveness of Mucinex. Blood work will be done today. If there is no improvement in her condition by the end of the week, she should contact the office.    2.  Medication management for type 2 diabetes.  A refill for Trulicity has been provided. She mentioned being out of her medication and usually takes it on Monday mornings.  Will do lab work today to monitor status of her diabetes.  I will contact her with the results and make any changes as needed.    Follow-up  The patient will follow up in 2 weeks for a recheck on her respiratory infection.        Call with any problems or concerns before next visit       Return in about 2 weeks (around 1/28/2025), or recheck PNA.  Patient or patient representative verbalized consent for the use of Ambient Listening during the visit with  Jo Vasquez MD for chart documentation. 1/15/2025  18:11 EST    Part of this note may be an electronic transcription/translation of spoken language to printed text using the Dragon Dictation System    Jo Vasquez MD1/15/[id number]:11 EST  This note has been electronically signed

## 2025-01-15 LAB
ALBUMIN SERPL-MCNC: 4.4 G/DL (ref 3.8–4.9)
ALP SERPL-CCNC: 91 IU/L (ref 44–121)
ALT SERPL-CCNC: 54 IU/L (ref 0–32)
AST SERPL-CCNC: 22 IU/L (ref 0–40)
BILIRUB SERPL-MCNC: 0.5 MG/DL (ref 0–1.2)
BUN SERPL-MCNC: 11 MG/DL (ref 6–24)
BUN/CREAT SERPL: 16 (ref 9–23)
CALCIUM SERPL-MCNC: 9.5 MG/DL (ref 8.7–10.2)
CHLORIDE SERPL-SCNC: 101 MMOL/L (ref 96–106)
CO2 SERPL-SCNC: 25 MMOL/L (ref 20–29)
CREAT SERPL-MCNC: 0.7 MG/DL (ref 0.57–1)
EGFRCR SERPLBLD CKD-EPI 2021: 105 ML/MIN/1.73
GLOBULIN SER CALC-MCNC: 2.8 G/DL (ref 1.5–4.5)
GLUCOSE SERPL-MCNC: 108 MG/DL (ref 70–99)
HBA1C MFR BLD: 6.4 % (ref 4.8–5.6)
MAGNESIUM SERPL-MCNC: 2 MG/DL (ref 1.6–2.3)
POTASSIUM SERPL-SCNC: 4.6 MMOL/L (ref 3.5–5.2)
PROT SERPL-MCNC: 7.2 G/DL (ref 6–8.5)
SODIUM SERPL-SCNC: 139 MMOL/L (ref 134–144)

## 2025-01-21 DIAGNOSIS — J18.9 COMMUNITY ACQUIRED PNEUMONIA, UNSPECIFIED LATERALITY: Primary | ICD-10-CM

## 2025-01-21 RX ORDER — LEVALBUTEROL TARTRATE 45 UG/1
1-2 AEROSOL, METERED ORAL EVERY 4 HOURS PRN
Qty: 15 G | Refills: 2 | Status: SHIPPED | OUTPATIENT
Start: 2025-01-21

## 2025-02-11 ENCOUNTER — OFFICE VISIT (OUTPATIENT)
Dept: FAMILY MEDICINE CLINIC | Facility: CLINIC | Age: 50
End: 2025-02-11
Payer: COMMERCIAL

## 2025-02-11 VITALS
BODY MASS INDEX: 34.22 KG/M2 | SYSTOLIC BLOOD PRESSURE: 108 MMHG | TEMPERATURE: 97.7 F | WEIGHT: 205.4 LBS | HEIGHT: 65 IN | DIASTOLIC BLOOD PRESSURE: 72 MMHG | OXYGEN SATURATION: 95 % | HEART RATE: 73 BPM | RESPIRATION RATE: 18 BRPM

## 2025-02-11 DIAGNOSIS — R05.1 ACUTE COUGH: ICD-10-CM

## 2025-02-11 DIAGNOSIS — J40 BRONCHITIS: Primary | ICD-10-CM

## 2025-02-11 PROCEDURE — 99213 OFFICE O/P EST LOW 20 MIN: CPT | Performed by: FAMILY MEDICINE

## 2025-02-11 NOTE — PROGRESS NOTES
Subjective   Maria E Barroso is a 49 y.o. female.   Chief Complaint   Patient presents with    Pneumonia       History of Present Illness   49 y.o. female presents to the office today for follow-up on lower respiratory tract infection.  She has been sick for most of January.  She was in the emergency room on January 7 and they diagnosed her with pneumonia.  When she came in on the 14th for her diabetes visit she was still really sick.  Repeat chest x-ray that day showed no evidence of pneumonia.  I treated her with prednisone burst and taper, Omnicef and doxycycline gave her an inhaler.        History of Present Illness  The patient presents for evaluation of a persistent cough.    She reports an overall improvement in her health status, with the exception of a residual cough that occasionally produces sputum. 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 She also mentions a significant increase in appetite during her steroid treatment, which she attributes to the medication. Despite this, she experienced a surge in energy levels. She has since resumed her ketogenic diet and has lost a few  pounds since discontinuing the steroids. She also practices intermittent fasting, which she finds beneficial.    MEDICATIONS  Discontinued: steroids      Patient Active Problem List    Diagnosis Date Noted    Mixed hyperlipidemia 09/29/2020    Class 1 obesity due to excess calories with serious comorbidity and body mass index (BMI) of 33.0 to 33.9 in adult 10/14/2019    Onychomycosis of toenail 04/01/2016    Abnormal mammogram 10/14/2015    Leukocytosis 08/06/2014    Nonspecific abnormal results of function study of liver 07/03/2014    Type 2 diabetes mellitus 05/07/2014           Past Surgical History:   Procedure Laterality Date    HYSTERECTOMY      total     TIBIA FRACTURE SURGERY Left     plate with screws    US GUIDED CYST ASPIRATION BREAST  N/A 2024     Current Outpatient Medications on File Prior to Visit   Medication Sig    albuterol sulfate  (90 Base) MCG/ACT inhaler Inhale 2 puffs Every 4 (Four) Hours As Needed for Wheezing or Shortness of Air.    atorvastatin (LIPITOR) 40 MG tablet TAKE 1 TABLET BY MOUTH EVERYDAY AT BEDTIME    Blood Glucose Monitoring Suppl (ACCU-CHEK [place] PLUS) w/Device kit 1 kit by Other route Daily.    Dulaglutide (Trulicity) 4.5 MG/0.5ML solution auto-injector Inject 4.5 mg under the skin into the appropriate area as directed Every 7 (Seven) Days.    glucose blood (ACCU-CHEK [place] PLUS) test strip 1 each by Other route Daily.    ibuprofen (ADVIL,MOTRIN) 200 MG tablet Take 2 tablets by mouth Every 6 (Six) Hours As Needed for Mild Pain.    Lancets (ACCU-CHEK SOFT TOUCH) lancets 1 each by Other route Daily.    metFORMIN (GLUCOPHAGE) 500 MG tablet TAKE 1 TABLET BY MOUTH EVERY DAY WITH BREAKFAST    [DISCONTINUED] levalbuterol (XOPENEX HFA) 45 MCG/ACT inhaler Inhale 1-2 puffs Every 4 (Four) Hours As Needed for Wheezing.    [DISCONTINUED] predniSONE (DELTASONE) 20 MG tablet 3 per day x 3 days. 2 per day x 3 days, 1 per day x 2 days, then 1/2 per day x 4 day     No current facility-administered medications on file prior to visit.     Allergies   Allergen Reactions    Contrast Dye (Echo Or Unknown Ct/Mr) Hives     Social History     Socioeconomic History    Marital status:    Tobacco Use    Smoking status: Former     Current packs/day: 0.00     Average packs/day: 1 pack/day for 29.0 years (29.0 ttl pk-yrs)     Types: Cigarettes     Start date: 1987     Quit date: 2016     Years since quittin.1     Passive exposure: Past    Smokeless tobacco: Never   Vaping Use    Vaping status: Never Used   Substance and Sexual Activity    Alcohol use: No    Drug use: No    Sexual activity: Not Currently     Partners: Male     Birth control/protection: Hysterectomy, Surgical     Family History   Problem Relation Age of Onset  "   Diabetes Mother     Hypertension Mother     Arthritis Mother     Bipolar disorder Mother     Heart disease Mother     Mental illness Mother     Stroke Father         age 55-56    Hypertension Father     Glaucoma Father     Hyperlipidemia Father     Bipolar disorder Sister     Diabetes Sister     Glaucoma Sister     Mental illness Sister        Review of Systems    Objective   /72 (BP Location: Right arm, Patient Position: Sitting, Cuff Size: Large Adult)   Pulse 73   Temp 97.7 °F (36.5 °C) (Infrared)   Resp 18   Ht 165.1 cm (65\")   Wt 93.2 kg (205 lb 6.4 oz)   LMP  (LMP Unknown)   SpO2 95%   Breastfeeding No   BMI 34.18 kg/m²   Physical Exam  Constitutional:       General: She is not in acute distress.     Appearance: She is well-developed.      Comments: Wearing a face mask     HENT:      Head: Normocephalic and atraumatic.   Eyes:      Conjunctiva/sclera: Conjunctivae normal.   Cardiovascular:      Rate and Rhythm: Normal rate and regular rhythm.      Heart sounds: Normal heart sounds. No murmur heard.  Pulmonary:      Effort: Pulmonary effort is normal. No respiratory distress.      Breath sounds: Normal breath sounds.   Musculoskeletal:         General: Normal range of motion.      Cervical back: Normal range of motion.   Skin:     General: Skin is warm and dry.      Findings: No rash.   Neurological:      Mental Status: She is alert and oriented to person, place, and time.   Psychiatric:         Behavior: Behavior normal.       Physical Exam  Lungs are clear.      Office Visit on 01/14/2025   Component Date Value Ref Range Status    Hemoglobin A1C 01/14/2025 6.4 (H)  4.8 - 5.6 % Final    Comment:          Prediabetes: 5.7 - 6.4           Diabetes: >6.4           Glycemic control for adults with diabetes: <7.0      Glucose 01/14/2025 108 (H)  70 - 99 mg/dL Final    BUN 01/14/2025 11  6 - 24 mg/dL Final    Creatinine 01/14/2025 0.70  0.57 - 1.00 mg/dL Final    EGFR Result 01/14/2025 105  >59 " mL/min/1.73 Final    BUN/Creatinine Ratio 01/14/2025 16  9 - 23 Final    Sodium 01/14/2025 139  134 - 144 mmol/L Final    Potassium 01/14/2025 4.6  3.5 - 5.2 mmol/L Final    Chloride 01/14/2025 101  96 - 106 mmol/L Final    Total CO2 01/14/2025 25  20 - 29 mmol/L Final    Calcium 01/14/2025 9.5  8.7 - 10.2 mg/dL Final    Total Protein 01/14/2025 7.2  6.0 - 8.5 g/dL Final    Albumin 01/14/2025 4.4  3.8 - 4.9 g/dL Final    Globulin 01/14/2025 2.8  1.5 - 4.5 g/dL Final    Total Bilirubin 01/14/2025 0.5  0.0 - 1.2 mg/dL Final    Alkaline Phosphatase 01/14/2025 91  44 - 121 IU/L Final    AST (SGOT) 01/14/2025 22  0 - 40 IU/L Final    ALT (SGPT) 01/14/2025 54 (H)  0 - 32 IU/L Final    Magnesium 01/14/2025 2.0  1.6 - 2.3 mg/dL Final   Admission on 01/07/2025, Discharged on 01/07/2025   Component Date Value Ref Range Status    COVID19 01/07/2025 Not Detected  Not Detected - Ref. Range Final    Influenza A PCR 01/07/2025 Not Detected  Not Detected Final    Influenza B PCR 01/07/2025 Not Detected  Not Detected Final    STREP A PCR 01/07/2025 Not Detected  Not Detected Final    RSV, PCR 01/07/2025 Not Detected  Not Detected Final   Office Visit on 10/14/2024   Component Date Value Ref Range Status    Hemoglobin A1C 10/14/2024 6.9 (H)  4.8 - 5.6 % Final    Comment:          Prediabetes: 5.7 - 6.4           Diabetes: >6.4           Glycemic control for adults with diabetes: <7.0      Total Cholesterol 10/14/2024 137  100 - 199 mg/dL Final    Triglycerides 10/14/2024 193 (H)  0 - 149 mg/dL Final    HDL Cholesterol 10/14/2024 34 (L)  >39 mg/dL Final    VLDL Cholesterol Myles 10/14/2024 32  5 - 40 mg/dL Final    LDL Chol Calc (NIH) 10/14/2024 71  0 - 99 mg/dL Final    Glucose 10/14/2024 114 (H)  70 - 99 mg/dL Final    BUN 10/14/2024 12  6 - 24 mg/dL Final    Creatinine 10/14/2024 0.94  0.57 - 1.00 mg/dL Final    EGFR Result 10/14/2024 73  >59 mL/min/1.73 Final    BUN/Creatinine Ratio 10/14/2024 13  9 - 23 Final    Sodium 10/14/2024  139  134 - 144 mmol/L Final    Potassium 10/14/2024 4.9  3.5 - 5.2 mmol/L Final    Chloride 10/14/2024 101  96 - 106 mmol/L Final    Total CO2 10/14/2024 25  20 - 29 mmol/L Final    Calcium 10/14/2024 9.6  8.7 - 10.2 mg/dL Final    Total Protein 10/14/2024 7.0  6.0 - 8.5 g/dL Final    Albumin 10/14/2024 4.3  3.8 - 4.9 g/dL Final    Globulin 10/14/2024 2.7  1.5 - 4.5 g/dL Final    Total Bilirubin 10/14/2024 0.3  0.0 - 1.2 mg/dL Final    Alkaline Phosphatase 10/14/2024 104  44 - 121 IU/L Final    AST (SGOT) 10/14/2024 13  0 - 40 IU/L Final    ALT (SGPT) 10/14/2024 21  0 - 32 IU/L Final    WBC 10/14/2024 10.8  3.4 - 10.8 x10E3/uL Final    RBC 10/14/2024 5.09  3.77 - 5.28 x10E6/uL Final    Hemoglobin 10/14/2024 14.2  11.1 - 15.9 g/dL Final    Hematocrit 10/14/2024 44.3  34.0 - 46.6 % Final    MCV 10/14/2024 87  79 - 97 fL Final    MCH 10/14/2024 27.9  26.6 - 33.0 pg Final    MCHC 10/14/2024 32.1  31.5 - 35.7 g/dL Final    RDW 10/14/2024 13.6  11.7 - 15.4 % Final    Platelets 10/14/2024 356  150 - 450 x10E3/uL Final    Neutrophil Rel % 10/14/2024 53  Not Estab. % Final    Lymphocyte Rel % 10/14/2024 39  Not Estab. % Final    Monocyte Rel % 10/14/2024 5  Not Estab. % Final    Eosinophil Rel % 10/14/2024 2  Not Estab. % Final    Basophil Rel % 10/14/2024 1  Not Estab. % Final    Neutrophils Absolute 10/14/2024 5.8  1.4 - 7.0 x10E3/uL Final    Lymphocytes Absolute 10/14/2024 4.2 (H)  0.7 - 3.1 x10E3/uL Final    Monocytes Absolute 10/14/2024 0.5  0.1 - 0.9 x10E3/uL Final    Eosinophils Absolute 10/14/2024 0.2  0.0 - 0.4 x10E3/uL Final    Basophils Absolute 10/14/2024 0.1  0.0 - 0.2 x10E3/uL Final    Immature Granulocyte Rel % 10/14/2024 0  Not Estab. % Final    Immature Grans Absolute 10/14/2024 0.0  0.0 - 0.1 x10E3/uL Final     Results  Imaging  Chest x-ray showed no evidence of pneumonia, increased interstitial markings, acute versus chronic.         Assessment & Plan   Diagnoses and all orders for this visit:    1.  Bronchitis (Primary)    2. Acute cough      Assessment & Plan  1. Persistent cough.  The patient reports significant improvement in her symptoms, with only a minor cough remaining. The chest x-ray showed no evidence of pneumonia, contrasting with the previous x-ray that indicated increased interstitial markings. Clinically, she had pneumonia, but the current x-ray suggests she may have had atypical bronchitis. Given her clinical improvement, a repeat chest x-ray is not necessary.    2. Weight gain.  The patient experienced weight gain due to steroid use, increasing from 196 pounds to 205 pounds. She has since lost one pound. She is advised to continue her ketogenic diet and intermittent fasting, which has been effective for her. She is encouraged to maintain her current level of physical activity, which includes constant movement at home and work.    Follow-up  The patient will follow up in 3 months.  Overall, I spent 20 minutes on the day of service reviewing everything above with Maria E.  Follow-up here again in 3 months for routine reevaluation of her diabetes.      Call with any problems or concerns before next visit       Return in about 3 months (around 5/11/2025).  Patient or patient representative verbalized consent for the use of Ambient Listening during the visit with  Jo Vasquez MD for chart documentation. 2/11/2025  16:26 EST    Part of this note may be an electronic transcription/translation of spoken language to printed text using the Dragon Dictation System    Jo Vasquez MD2/11/202516:24 EST  This note has been electronically signed

## 2025-03-21 ENCOUNTER — TELEPHONE (OUTPATIENT)
Dept: FAMILY MEDICINE CLINIC | Facility: CLINIC | Age: 50
End: 2025-03-21
Payer: COMMERCIAL

## 2025-03-21 NOTE — TELEPHONE ENCOUNTER
"I need a little more information.  This makes no sense.  This is a chronic, long-term use medication.  If they are making up some arbitrary \"limit\".  I need some documentation of what they are telling her to even figure out what to do about it.    Please call [facility] pharmacy-where we have sent the prescription-and ask them if they know anything about this.  Thanks!  "

## 2025-03-21 NOTE — TELEPHONE ENCOUNTER
Okay, thanks.  Please advise her as such.  The dose of Trulicity she is on is a standard FDA approved dose at the approved dosing interval developed by the .  I have no idea how they can suggest they will only pay for it if it is prescribed other than how it was intended.  I do not see anything in the chart from her insurance company explaining this.  Please ask her to follow-up with this again once she has spoken to them.  Thanks!

## 2025-03-21 NOTE — TELEPHONE ENCOUNTER
Caller: Maria E Barroso    Relationship: Self    Best call back number: [phone]     Which medication are you concerned about: Dulaglutide (Trulicity) 4.5 MG/0.5ML solution auto-injector     Who prescribed you this medication: KELLEY    When did you start taking this medication: YEARS    What are your concerns: INSURANCE IS TELLING THE PATIENT SHE HAS EXCEEDED HER LIMIT OF THE MEDICINE. PLEASE CALL TO DISCUSS ISSUE WITH PATIENT AND WHAT CAN BE DO     How long have you had these concerns: SINCE JANUARY

## 2025-03-21 NOTE — TELEPHONE ENCOUNTER
I called [facility] and they said yes her insurance is saying plan limitations and it will only pay for so much a month. They stated she would have to call her insurance and why they are now doing this and see what else needs to be done.

## 2025-03-21 NOTE — TELEPHONE ENCOUNTER
HUB TO RELAY     I called [facility] and they said yes her insurance is saying plan limitations and it will only pay for so much a month. They stated she would have to call her insurance and why they are now doing this and see what else needs to be done.    PER DR BENSON:   Okay, thanks.  Please advise her as such.  The dose of Trulicity she is on is a standard FDA approved dose at the approved dosing interval developed by the .  I have no idea how they can suggest they will only pay for it if it is prescribed other than how it was intended.  I do not see anything in the chart from her insurance company explaining this.  Please ask her to follow-up with this again once she has spoken to them.  Thanks!

## 2025-04-12 ENCOUNTER — APPOINTMENT (OUTPATIENT)
Dept: CT IMAGING | Facility: HOSPITAL | Age: 50
End: 2025-04-12
Payer: COMMERCIAL

## 2025-04-12 ENCOUNTER — HOSPITAL ENCOUNTER (EMERGENCY)
Facility: HOSPITAL | Age: 50
Discharge: HOME | End: 2025-04-12
Attending: EMERGENCY MEDICINE | Admitting: EMERGENCY MEDICINE
Payer: COMMERCIAL

## 2025-04-12 ENCOUNTER — APPOINTMENT (OUTPATIENT)
Dept: GENERAL RADIOLOGY | Facility: HOSPITAL | Age: 50
End: 2025-04-12
Payer: COMMERCIAL

## 2025-04-12 VITALS
OXYGEN SATURATION: 94 % | BODY MASS INDEX: 33.4 KG/M2 | RESPIRATION RATE: 17 BRPM | HEART RATE: 84 BPM | WEIGHT: 207.8 LBS | DIASTOLIC BLOOD PRESSURE: 74 MMHG | HEIGHT: 66 IN | TEMPERATURE: 98 F | SYSTOLIC BLOOD PRESSURE: 104 MMHG

## 2025-04-12 DIAGNOSIS — R42 POSITIONAL LIGHTHEADEDNESS: Primary | ICD-10-CM

## 2025-04-12 LAB
ALBUMIN SERPL-MCNC: 4.4 G/DL (ref 3.5–5.2)
ALBUMIN/GLOB SERPL: 1.5 G/DL
ALP SERPL-CCNC: 98 U/L (ref 39–117)
ALT SERPL W P-5'-P-CCNC: 23 U/L (ref 1–33)
ANION GAP SERPL CALCULATED.3IONS-SCNC: 9 MMOL/L (ref 5–15)
AST SERPL-CCNC: 16 U/L (ref 1–32)
BASOPHILS # BLD AUTO: 0.03 10*3/MM3 (ref 0–0.2)
BASOPHILS NFR BLD AUTO: 0.3 % (ref 0–1.5)
BILIRUB SERPL-MCNC: 0.3 MG/DL (ref 0–1.2)
BILIRUB UR QL STRIP: NEGATIVE
BUN SERPL-MCNC: 12 MG/DL (ref 6–20)
BUN/CREAT SERPL: 16.2 (ref 7–25)
CALCIUM SPEC-SCNC: 9.9 MG/DL (ref 8.6–10.5)
CHLORIDE SERPL-SCNC: 102 MMOL/L (ref 98–107)
CLARITY UR: ABNORMAL
CO2 SERPL-SCNC: 24 MMOL/L (ref 22–29)
COLOR UR: YELLOW
CREAT SERPL-MCNC: 0.74 MG/DL (ref 0.57–1)
DEPRECATED RDW RBC AUTO: 48.3 FL (ref 37–54)
EGFRCR SERPLBLD CKD-EPI 2021: 98.1 ML/MIN/1.73
EOSINOPHIL # BLD AUTO: 0.2 10*3/MM3 (ref 0–0.4)
EOSINOPHIL NFR BLD AUTO: 1.8 % (ref 0.3–6.2)
ERYTHROCYTE [DISTWIDTH] IN BLOOD BY AUTOMATED COUNT: 14.4 % (ref 12.3–15.4)
GEN 5 1HR TROPONIN T REFLEX: <6 NG/L
GLOBULIN UR ELPH-MCNC: 3 GM/DL
GLUCOSE BLDC GLUCOMTR-MCNC: 113 MG/DL (ref 70–130)
GLUCOSE SERPL-MCNC: 129 MG/DL (ref 65–99)
GLUCOSE UR STRIP-MCNC: NEGATIVE MG/DL
HCT VFR BLD AUTO: 46.1 % (ref 34–46.6)
HGB BLD-MCNC: 14.1 G/DL (ref 12–15.9)
HGB UR QL STRIP.AUTO: NEGATIVE
IMM GRANULOCYTES # BLD AUTO: 0.03 10*3/MM3 (ref 0–0.05)
IMM GRANULOCYTES NFR BLD AUTO: 0.3 % (ref 0–0.5)
KETONES UR QL STRIP: NEGATIVE
LEUKOCYTE ESTERASE UR QL STRIP.AUTO: NEGATIVE
LYMPHOCYTES # BLD AUTO: 4.42 10*3/MM3 (ref 0.7–3.1)
LYMPHOCYTES NFR BLD AUTO: 39.7 % (ref 19.6–45.3)
MAGNESIUM SERPL-MCNC: 2 MG/DL (ref 1.6–2.6)
MCH RBC QN AUTO: 27.4 PG (ref 26.6–33)
MCHC RBC AUTO-ENTMCNC: 30.6 G/DL (ref 31.5–35.7)
MCV RBC AUTO: 89.5 FL (ref 79–97)
MONOCYTES # BLD AUTO: 0.58 10*3/MM3 (ref 0.1–0.9)
MONOCYTES NFR BLD AUTO: 5.2 % (ref 5–12)
NEUTROPHILS NFR BLD AUTO: 5.86 10*3/MM3 (ref 1.7–7)
NEUTROPHILS NFR BLD AUTO: 52.7 % (ref 42.7–76)
NITRITE UR QL STRIP: NEGATIVE
PH UR STRIP.AUTO: 5.5 [PH] (ref 5–8)
PLATELET # BLD AUTO: 329 10*3/MM3 (ref 140–450)
PMV BLD AUTO: 9.6 FL (ref 6–12)
POTASSIUM SERPL-SCNC: 4 MMOL/L (ref 3.5–5.2)
PROT SERPL-MCNC: 7.4 G/DL (ref 6–8.5)
PROT UR QL STRIP: NEGATIVE
QT INTERVAL: 401 MS
QTC INTERVAL: 419 MS
RBC # BLD AUTO: 5.15 10*6/MM3 (ref 3.77–5.28)
SODIUM SERPL-SCNC: 135 MMOL/L (ref 136–145)
SP GR UR STRIP: >=1.03 (ref 1–1.03)
TROPONIN T NUMERIC DELTA: NORMAL
TROPONIN T SERPL HS-MCNC: <6 NG/L
UROBILINOGEN UR QL STRIP: ABNORMAL
WBC NRBC COR # BLD AUTO: 11.12 10*3/MM3 (ref 3.4–10.8)

## 2025-04-12 PROCEDURE — 83735 ASSAY OF MAGNESIUM: CPT

## 2025-04-12 PROCEDURE — 85025 COMPLETE CBC W/AUTO DIFF WBC: CPT

## 2025-04-12 PROCEDURE — 80053 COMPREHEN METABOLIC PANEL: CPT

## 2025-04-12 PROCEDURE — 36415 COLL VENOUS BLD VENIPUNCTURE: CPT

## 2025-04-12 PROCEDURE — 99284 EMERGENCY DEPT VISIT MOD MDM: CPT

## 2025-04-12 PROCEDURE — 70450 CT HEAD/BRAIN W/O DYE: CPT

## 2025-04-12 PROCEDURE — 82948 REAGENT STRIP/BLOOD GLUCOSE: CPT

## 2025-04-12 PROCEDURE — 71045 X-RAY EXAM CHEST 1 VIEW: CPT

## 2025-04-12 PROCEDURE — 96374 THER/PROPH/DIAG INJ IV PUSH: CPT

## 2025-04-12 PROCEDURE — 81003 URINALYSIS AUTO W/O SCOPE: CPT

## 2025-04-12 PROCEDURE — 93005 ELECTROCARDIOGRAM TRACING: CPT | Performed by: EMERGENCY MEDICINE

## 2025-04-12 PROCEDURE — 25810000003 SODIUM CHLORIDE 0.9 % SOLUTION

## 2025-04-12 PROCEDURE — 96361 HYDRATE IV INFUSION ADD-ON: CPT

## 2025-04-12 PROCEDURE — 84484 ASSAY OF TROPONIN QUANT: CPT

## 2025-04-12 PROCEDURE — 99283 EMERGENCY DEPT VISIT LOW MDM: CPT

## 2025-04-12 PROCEDURE — 25010000002 ONDANSETRON PER 1 MG

## 2025-04-12 RX ORDER — ONDANSETRON 4 MG/1
4 TABLET, ORALLY DISINTEGRATING ORAL EVERY 8 HOURS PRN
Qty: 12 TABLET | Refills: 0 | Status: SHIPPED | OUTPATIENT
Start: 2025-04-12

## 2025-04-12 RX ORDER — MECLIZINE HYDROCHLORIDE 25 MG/1
25 TABLET ORAL 3 TIMES DAILY PRN
Qty: 12 TABLET | Refills: 0 | Status: SHIPPED | OUTPATIENT
Start: 2025-04-12

## 2025-04-12 RX ORDER — MECLIZINE HYDROCHLORIDE 25 MG/1
25 TABLET ORAL ONCE
Status: COMPLETED | OUTPATIENT
Start: 2025-04-12 | End: 2025-04-12

## 2025-04-12 RX ORDER — SODIUM CHLORIDE 0.9 % (FLUSH) 0.9 %
10 SYRINGE (ML) INJECTION AS NEEDED
Status: DISCONTINUED | OUTPATIENT
Start: 2025-04-12 | End: 2025-04-12 | Stop reason: HOSPADM

## 2025-04-12 RX ORDER — ONDANSETRON 2 MG/ML
4 INJECTION INTRAMUSCULAR; INTRAVENOUS ONCE
Status: COMPLETED | OUTPATIENT
Start: 2025-04-12 | End: 2025-04-12

## 2025-04-12 RX ADMIN — ONDANSETRON 4 MG: 2 INJECTION, SOLUTION INTRAMUSCULAR; INTRAVENOUS at 18:58

## 2025-04-12 RX ADMIN — MECLIZINE HYDROCHLORIDE 25 MG: 25 TABLET ORAL at 19:00

## 2025-04-12 RX ADMIN — SODIUM CHLORIDE 1000 ML: 9 INJECTION, SOLUTION INTRAVENOUS at 18:59

## 2025-04-12 NOTE — Clinical Note
[facility] [place] [address] [place] [address] [place] [address]  Phone: [phone]    Maria E Barroso was seen and treated in our emergency department on 4/12/2025.  She may return to work on 04/15/2025.         Thank you for choosing [facility].    Caridad Antunez APRN

## 2025-04-12 NOTE — Clinical Note
[facility] [place] [address] [place] [address] [place] [address]  Phone: [phone]    Maria E Barroso was seen and treated in our emergency department on 4/12/2025.  She may return to work on 04/16/2025.         Thank you for choosing [facility].    Caridad Antunez APRN

## 2025-04-13 NOTE — FSED PROVIDER NOTE
[facility]STANDING ED / URGENT CARE    EMERGENCY DEPARTMENT ENCOUNTER    Room Number:  06/06  Date seen:  4/12/2025  Time seen: 21:34 EDT  PCP: Jo Vasquez MD  Historian: patient    HPI:  Chief complaint:dizziness  Context:Maria E Barroso is a 49 y.o. female who presents to the ED with c/o dizziness.  Patient reports that she started having dizziness last night.  She reports that she had a headache as well.  Reports that she slept on the couch and when she woke up the dizziness had improved but not completely gone away.  She reports that she has had dizziness throughout the day.  She denies any vision changes, unilateral weakness, slurred speech.  Patient is able to answer questions appropriately.  Patient's gait is normal.  Patient states it feels like a pressure in the back of her head.  She states that the dizziness is worse with movement.  Patient nontoxic in appearance    Duration: Yesterday  Intensity/Severity: Moderate  Associated Symptoms: Dizziness,    MEDICAL RECORD REVIEW  Diabetes, endometriosis, hyperlipidemia    ALLERGIES  Contrast dye (echo or unknown ct/mr)    PAST MEDICAL HISTORY  Active Ambulatory Problems     Diagnosis Date Noted    Abnormal mammogram 10/14/2015    Leukocytosis 08/06/2014    Nonspecific abnormal results of function study of liver 07/03/2014    Onychomycosis of toenail 04/01/2016    Type 2 diabetes mellitus 05/07/2014    Class 1 obesity due to excess calories with serious comorbidity and body mass index (BMI) of 33.0 to 33.9 in adult 10/14/2019    Mixed hyperlipidemia 09/29/2020     Resolved Ambulatory Problems     Diagnosis Date Noted    No Resolved Ambulatory Problems     Past Medical History:   Diagnosis Date    Diabetes mellitus, type II     Endometriosis     History of colposcopy with cervical biopsy     Hyperlipidemia        PAST SURGICAL HISTORY  Past Surgical History:   Procedure Laterality Date    HYSTERECTOMY      total     TIBIA FRACTURE  SURGERY Left     plate with screws    US GUIDED CYST ASPIRATION BREAST N/A 2024       FAMILY HISTORY  Family History   Problem Relation Age of Onset    Diabetes Mother     Hypertension Mother     Arthritis Mother     Bipolar disorder Mother     Heart disease Mother     Mental illness Mother     Stroke Father         age 55-56    Hypertension Father     Glaucoma Father     Hyperlipidemia Father     Bipolar disorder Sister     Diabetes Sister     Glaucoma Sister     Mental illness Sister        SOCIAL HISTORY  Social History     Socioeconomic History    Marital status:    Tobacco Use    Smoking status: Former     Current packs/day: 0.00     Average packs/day: 1 pack/day for 29.0 years (29.0 ttl pk-yrs)     Types: Cigarettes     Start date: 1987     Quit date:      Years since quittin.2     Passive exposure: Past    Smokeless tobacco: Never   Vaping Use    Vaping status: Never Used   Substance and Sexual Activity    Alcohol use: No    Drug use: No    Sexual activity: Not Currently     Partners: Male     Birth control/protection: Hysterectomy, Surgical       REVIEW OF SYSTEMS  Review of Systems    All systems reviewed and negative except for those discussed in HPI.     PHYSICAL EXAM    I have reviewed the triage vital signs and nursing notes.    ED Triage Vitals [25 1836]   Temp Heart Rate Resp BP SpO2   98 °F (36.7 °C) 76 18 107/62 96 %      Temp src Heart Rate Source Patient Position BP Location FiO2 (%)   Oral Monitor Sitting Right arm --       Physical Exam  Constitutional:       Appearance: Normal appearance. She is not toxic-appearing.   HENT:      Right Ear: Tympanic membrane and ear canal normal.      Left Ear: Tympanic membrane and ear canal normal.      Nose: Nose normal.      Mouth/Throat:      Mouth: Mucous membranes are moist.   Eyes:      Extraocular Movements: Extraocular movements intact.      Pupils: Pupils are equal, round, and reactive to light.   Cardiovascular:       Rate and Rhythm: Normal rate and regular rhythm.      Pulses: Normal pulses.      Heart sounds: Normal heart sounds.   Pulmonary:      Effort: Pulmonary effort is normal.      Breath sounds: Normal breath sounds.   Musculoskeletal:         General: Normal range of motion.      Cervical back: Normal range of motion.   Skin:     General: Skin is warm.   Neurological:      General: No focal deficit present.      Mental Status: She is alert and oriented to person, place, and time.      GCS: GCS eye subscore is 4. GCS verbal subscore is 5. GCS motor subscore is 6.      Sensory: Sensation is intact.      Motor: Motor function is intact.      Coordination: Coordination is intact.      Gait: Gait is intact.      Comments: NIH is 0   Psychiatric:         Mood and Affect: Mood normal.         Behavior: Behavior normal.         Vital signs and nursing notes reviewed.        LAB RESULTS  Recent Results (from the past 24 hours)   POC Glucose Once    Collection Time: 04/12/25  6:41 PM    Specimen: Blood   Result Value Ref Range    Glucose 113 70 - 130 mg/dL   ECG 12 Lead Other; dizzy    Collection Time: 04/12/25  6:45 PM   Result Value Ref Range    QT Interval 401 ms    QTC Interval 419 ms   Comprehensive Metabolic Panel    Collection Time: 04/12/25  6:59 PM    Specimen: Blood   Result Value Ref Range    Glucose 129 (H) 65 - 99 mg/dL    BUN 12 6 - 20 mg/dL    Creatinine 0.74 0.57 - 1.00 mg/dL    Sodium 135 (L) 136 - 145 mmol/L    Potassium 4.0 3.5 - 5.2 mmol/L    Chloride 102 98 - 107 mmol/L    CO2 24.0 22.0 - 29.0 mmol/L    Calcium 9.9 8.6 - 10.5 mg/dL    Total Protein 7.4 6.0 - 8.5 g/dL    Albumin 4.4 3.5 - 5.2 g/dL    ALT (SGPT) 23 1 - 33 U/L    AST (SGOT) 16 1 - 32 U/L    Alkaline Phosphatase 98 39 - 117 U/L    Total Bilirubin 0.3 0.0 - 1.2 mg/dL    Globulin 3.0 gm/dL    A/G Ratio 1.5 g/dL    BUN/Creatinine Ratio 16.2 7.0 - 25.0    Anion Gap 9.0 5.0 - 15.0 mmol/L    eGFR 98.1 >60.0 mL/min/1.73   High Sensitivity Troponin T     Collection Time: 04/12/25  6:59 PM    Specimen: Blood   Result Value Ref Range    HS Troponin T <6 <14 ng/L   Magnesium    Collection Time: 04/12/25  6:59 PM    Specimen: Blood   Result Value Ref Range    Magnesium 2.0 1.6 - 2.6 mg/dL   CBC Auto Differential    Collection Time: 04/12/25  6:59 PM    Specimen: Blood   Result Value Ref Range    WBC 11.12 (H) 3.40 - 10.80 10*3/mm3    RBC 5.15 3.77 - 5.28 10*6/mm3    Hemoglobin 14.1 12.0 - 15.9 g/dL    Hematocrit 46.1 34.0 - 46.6 %    MCV 89.5 79.0 - 97.0 fL    MCH 27.4 26.6 - 33.0 pg    MCHC 30.6 (L) 31.5 - 35.7 g/dL    RDW 14.4 12.3 - 15.4 %    RDW-SD 48.3 37.0 - 54.0 fl    MPV 9.6 6.0 - 12.0 fL    Platelets 329 140 - 450 10*3/mm3    Neutrophil % 52.7 42.7 - 76.0 %    Lymphocyte % 39.7 19.6 - 45.3 %    Monocyte % 5.2 5.0 - 12.0 %    Eosinophil % 1.8 0.3 - 6.2 %    Basophil % 0.3 0.0 - 1.5 %    Immature Grans % 0.3 0.0 - 0.5 %    Neutrophils, Absolute 5.86 1.70 - 7.00 10*3/mm3    Lymphocytes, Absolute 4.42 (H) 0.70 - 3.10 10*3/mm3    Monocytes, Absolute 0.58 0.10 - 0.90 10*3/mm3    Eosinophils, Absolute 0.20 0.00 - 0.40 10*3/mm3    Basophils, Absolute 0.03 0.00 - 0.20 10*3/mm3    Immature Grans, Absolute 0.03 0.00 - 0.05 10*3/mm3   Urinalysis With Microscopic If Indicated (No Culture) - Urine, Clean Catch    Collection Time: 04/12/25  8:03 PM    Specimen: Urine, Clean Catch   Result Value Ref Range    Color, UA Yellow Yellow, Straw    Appearance, UA Slightly Cloudy (A) Clear    pH, UA 5.5 5.0 - 8.0    Specific Gravity, UA >=1.030 1.005 - 1.030    Glucose, UA Negative Negative    Ketones, UA Negative Negative    Bilirubin, UA Negative Negative    Blood, UA Negative Negative    Protein, UA Negative Negative    Leuk Esterase, UA Negative Negative    Nitrite, UA Negative Negative    Urobilinogen, UA 0.2 E.U./dL 0.2 - 1.0 E.U./dL   High Sensitivity Troponin T 1Hr    Collection Time: 04/12/25  8:21 PM    Specimen: Blood   Result Value Ref Range    HS Troponin T <6 <14 ng/L     Troponin T Numeric Delta         Ordered the above labs and independently reviewed the results.      RADIOLOGY RESULTS  XR Chest 1 View  Result Date: 4/12/2025  XR CHEST 1 VW Date of Exam: 4/12/2025 7:12 PM EDT Indication: Weak/Dizzy/AMS triage protocol Comparison: 1/14/2025. Findings: There are no airspace consolidations. No pleural fluid. No pneumothorax. The pulmonary vasculature appears within normal limits. Stable chronic interstitial changes present. Stable calcified granuloma within the left lung. The cardiac and mediastinal silhouette appear unremarkable. No acute osseous abnormality identified.     Impression: No acute cardiopulmonary process. Electronically Signed: Vanessa Stevens MD  4/12/2025 7:56 PM EDT  Workstation ID: [id number]    CT Head Without Contrast  Result Date: 4/12/2025  CT HEAD WO CONTRAST Date of Exam: 4/12/2025 7:06 PM EDT Indication: dizziness. Comparison: 11/23/2014 Technique: Axial CT images were obtained of the head without contrast administration.  Coronal reconstructions were performed.  Automated exposure control and iterative reconstruction methods were used. Findings: No midline shift. Basal cisterns appear patent.  Ventricles and sulci appear within normal limits for patient age. No extra-axial collection or acute hemorrhage is identified.  No definite mass lesion, edema, or significant mass effect is seen.  No definite CT findings of acute infarction. Paranasal sinuses appear clear.  Mastoid air cells appear clear.  No acute calvarial abnormality is identified.     Impression: No CT findings of acute intracranial abnormality. Electronically Signed: Jourdan Car  4/12/2025 7:46 PM EDT  Workstation ID: [id number]         I ordered the above noted radiological studies. Independently reviewed by me and discussed with radiologist.  See dictation above for official radiology interpretation.      Orders placed during this visit:  Orders Placed This Encounter   Procedures    XR Chest 1 View     CT Head Without Contrast    Comprehensive Metabolic Panel    High Sensitivity Troponin T    Magnesium    Urinalysis With Microscopic If Indicated (No Culture) - Urine, Clean Catch    CBC Auto Differential    High Sensitivity Troponin T 1Hr    NPO Diet NPO Type: Strict NPO    Undress & Gown    Continuous Pulse Oximetry    Vital Signs    Orthostatic Blood Pressure    Oxygen Therapy- Nasal Cannula; Titrate 1-6 LPM Per SpO2; 90 - 95%    POC Glucose STAT    POC Glucose Once    ECG 12 Lead Other; dizzy    Insert Peripheral IV    Fall Precautions    CBC & Differential    ED Acknowledgement Form Needed;           PROCEDURES    Procedures        MEDICATIONS GIVEN IN ER    Medications   sodium chloride 0.9 % flush 10 mL (has no administration in time range)   sodium chloride 0.9 % bolus 1,000 mL (0 mL Intravenous Stopped 4/12/25 2103)   ondansetron (ZOFRAN) injection 4 mg (4 mg Intravenous Given 4/12/25 1858)   meclizine (ANTIVERT) tablet 25 mg (25 mg Oral Given 4/12/25 1900)         PROGRESS, DATA ANALYSIS, CONSULTS, AND MEDICAL DECISION MAKING    All labs and radiology studies have been independently reviewed by me.          AS OF 21:37 EDT VITALS:    BP - 104/74  HR - 84  TEMP - 98 °F (36.7 °C) (Oral)  02 SATS - 94%    Medical Decision Making  Patient is a 49-year-old female who presents today with dizziness.  Patient an IV established and blood work was obtained. No history of recent infection so doubt vestibular neuritis. History not consistent with meniere's disease. No history of trauma. No red flag features for central vertigo to include gradual onset, vertical/bidirectional or non-fatigable nystagmus, focal neurologic findings on exam (including inability to ambulate, ataxia, dysmetria). Presentation not consistent with an acute CNS infection, vertebral basilar artery insufficiency, cerebellar hemorrhage or infarction, intracranial mass or bleed.  Patient was given IV fluids Zofran and meclizine.  I did offer her  CTA but the patient declined stating that she would like to go home and follow-up with neurology and her primary care provider.  I did discuss the discharge instructions with the patient.  She was given strict return precautions with understanding    Problems Addressed:  Positional lightheadedness: complicated acute illness or injury    Amount and/or Complexity of Data Reviewed  Labs: ordered.  Radiology: ordered.  ECG/medicine tests: ordered.    Risk  Prescription drug management.          DIAGNOSIS  Final diagnoses:   Positional lightheadedness       New Medications Ordered This Visit   Medications    sodium chloride 0.9 % flush 10 mL    sodium chloride 0.9 % bolus 1,000 mL    ondansetron (ZOFRAN) injection 4 mg    meclizine (ANTIVERT) tablet 25 mg    meclizine (ANTIVERT) 25 MG tablet     Sig: Take 1 tablet by mouth 3 (Three) Times a Day As Needed for Dizziness or Nausea.     Dispense:  12 tablet     Refill:  0    ondansetron ODT (ZOFRAN-ODT) 4 MG disintegrating tablet     Sig: Place 1 tablet on the tongue Every 8 (Eight) Hours As Needed for Nausea or Vomiting.     Dispense:  12 tablet     Refill:  0           I performed hand hygiene on entry into the pt room and upon exit.      Part of this note may be an electronic transcription/translation of spoken language to printed text using the Dragon Dictation System.     Appropriate PPE worn during exam.    Dictated utilizing Dragon dictation     Note Disclaimer: At [facility], we believe that sharing information builds trust and better relationships. You are receiving this note because you recently visited [facility]. It is possible you will see health information before a provider has talked with you about it. This kind of information can be easy to misunderstand. To help you fully understand what it means for your health, we urge you to discuss this note with your provider.

## 2025-04-13 NOTE — DISCHARGE INSTRUCTIONS
Change positions slowly.  Make sure you are drinking plenty fluids and getting rest.  You can use the meclizine as needed for dizziness.  Take the Zofran as needed for nausea.  Please follow-up with your primary care provider and the neurologist as previously discussed on Monday.  Return to the emergency room for any new or worsening symptoms

## 2025-04-15 ENCOUNTER — HOSPITAL ENCOUNTER (EMERGENCY)
Facility: HOSPITAL | Age: 50
Discharge: HOME | End: 2025-04-15
Attending: EMERGENCY MEDICINE | Admitting: EMERGENCY MEDICINE
Payer: COMMERCIAL

## 2025-04-15 VITALS
SYSTOLIC BLOOD PRESSURE: 112 MMHG | WEIGHT: 207.89 LBS | RESPIRATION RATE: 16 BRPM | HEIGHT: 66 IN | DIASTOLIC BLOOD PRESSURE: 57 MMHG | TEMPERATURE: 98.1 F | BODY MASS INDEX: 33.41 KG/M2 | HEART RATE: 72 BPM | OXYGEN SATURATION: 96 %

## 2025-04-15 DIAGNOSIS — R42 VERTIGO: Primary | ICD-10-CM

## 2025-04-15 LAB
ANION GAP SERPL CALCULATED.3IONS-SCNC: 11.2 MMOL/L (ref 5–15)
BASOPHILS # BLD AUTO: 0.05 10*3/MM3 (ref 0–0.2)
BASOPHILS NFR BLD AUTO: 0.5 % (ref 0–1.5)
BUN SERPL-MCNC: 12 MG/DL (ref 6–20)
BUN/CREAT SERPL: 14.6 (ref 7–25)
CALCIUM SPEC-SCNC: 10.2 MG/DL (ref 8.6–10.5)
CHLORIDE SERPL-SCNC: 100 MMOL/L (ref 98–107)
CO2 SERPL-SCNC: 25.8 MMOL/L (ref 22–29)
CREAT SERPL-MCNC: 0.82 MG/DL (ref 0.57–1)
DEPRECATED RDW RBC AUTO: 45.7 FL (ref 37–54)
EGFRCR SERPLBLD CKD-EPI 2021: 86.7 ML/MIN/1.73
EOSINOPHIL # BLD AUTO: 0.23 10*3/MM3 (ref 0–0.4)
EOSINOPHIL NFR BLD AUTO: 2.5 % (ref 0.3–6.2)
ERYTHROCYTE [DISTWIDTH] IN BLOOD BY AUTOMATED COUNT: 14.2 % (ref 12.3–15.4)
GEN 5 1HR TROPONIN T REFLEX: <6 NG/L
GLUCOSE SERPL-MCNC: 143 MG/DL (ref 65–99)
HCT VFR BLD AUTO: 45.1 % (ref 34–46.6)
HGB BLD-MCNC: 14.1 G/DL (ref 12–15.9)
IMM GRANULOCYTES # BLD AUTO: 0.02 10*3/MM3 (ref 0–0.05)
IMM GRANULOCYTES NFR BLD AUTO: 0.2 % (ref 0–0.5)
LYMPHOCYTES # BLD AUTO: 3.96 10*3/MM3 (ref 0.7–3.1)
LYMPHOCYTES NFR BLD AUTO: 43 % (ref 19.6–45.3)
MCH RBC QN AUTO: 27.5 PG (ref 26.6–33)
MCHC RBC AUTO-ENTMCNC: 31.3 G/DL (ref 31.5–35.7)
MCV RBC AUTO: 88.1 FL (ref 79–97)
MONOCYTES # BLD AUTO: 0.56 10*3/MM3 (ref 0.1–0.9)
MONOCYTES NFR BLD AUTO: 6.1 % (ref 5–12)
NEUTROPHILS NFR BLD AUTO: 4.38 10*3/MM3 (ref 1.7–7)
NEUTROPHILS NFR BLD AUTO: 47.7 % (ref 42.7–76)
NRBC BLD AUTO-RTO: 0 /100 WBC (ref 0–0.2)
PLATELET # BLD AUTO: 305 10*3/MM3 (ref 140–450)
PMV BLD AUTO: 9.8 FL (ref 6–12)
POTASSIUM SERPL-SCNC: 3.9 MMOL/L (ref 3.5–5.2)
RBC # BLD AUTO: 5.12 10*6/MM3 (ref 3.77–5.28)
SODIUM SERPL-SCNC: 137 MMOL/L (ref 136–145)
TROPONIN T NUMERIC DELTA: NORMAL
TROPONIN T SERPL HS-MCNC: <6 NG/L
WBC NRBC COR # BLD AUTO: 9.2 10*3/MM3 (ref 3.4–10.8)
WHOLE BLOOD HOLD COAG: NORMAL

## 2025-04-15 PROCEDURE — 84484 ASSAY OF TROPONIN QUANT: CPT | Performed by: EMERGENCY MEDICINE

## 2025-04-15 PROCEDURE — 36415 COLL VENOUS BLD VENIPUNCTURE: CPT

## 2025-04-15 PROCEDURE — 93005 ELECTROCARDIOGRAM TRACING: CPT | Performed by: EMERGENCY MEDICINE

## 2025-04-15 PROCEDURE — 85025 COMPLETE CBC W/AUTO DIFF WBC: CPT | Performed by: EMERGENCY MEDICINE

## 2025-04-15 PROCEDURE — 80048 BASIC METABOLIC PNL TOTAL CA: CPT | Performed by: EMERGENCY MEDICINE

## 2025-04-15 PROCEDURE — 99283 EMERGENCY DEPT VISIT LOW MDM: CPT

## 2025-04-15 RX ORDER — SODIUM CHLORIDE 0.9 % (FLUSH) 0.9 %
10 SYRINGE (ML) INJECTION AS NEEDED
Status: DISCONTINUED | OUTPATIENT
Start: 2025-04-15 | End: 2025-04-15 | Stop reason: HOSPADM

## 2025-04-15 RX ORDER — CLONAZEPAM 0.5 MG/1
0.5 TABLET ORAL 2 TIMES DAILY PRN
Qty: 8 TABLET | Refills: 0 | Status: SHIPPED | OUTPATIENT
Start: 2025-04-15

## 2025-04-15 NOTE — ED PROVIDER NOTES
Subjective   History of Present Illness  Patient is a 49-year-old female complaining of dizziness for the past 4 days.  She states was seen in outlying facility had negative CT scan and laboratory data at that time.  States she continues to be dizzy in certain motions and positions.  She denies headache cough fever chest pain shortness of breath vomiting or other complaint.  States she does have a fullness in the back of her head.      Review of Systems    Past Medical History:   Diagnosis Date    Diabetes mellitus, type II     Endometriosis     History of colposcopy with cervical biopsy     stage 3    Hyperlipidemia        Allergies   Allergen Reactions    Contrast Dye (Echo Or Unknown Ct/Mr) Hives       Past Surgical History:   Procedure Laterality Date    HYSTERECTOMY      total     TIBIA FRACTURE SURGERY Left     plate with screws    US GUIDED CYST ASPIRATION BREAST N/A 2024       Family History   Problem Relation Age of Onset    Diabetes Mother     Hypertension Mother     Arthritis Mother     Bipolar disorder Mother     Heart disease Mother     Mental illness Mother     Stroke Father         age 55-56    Hypertension Father     Glaucoma Father     Hyperlipidemia Father     Bipolar disorder Sister     Diabetes Sister     Glaucoma Sister     Mental illness Sister        Social History     Socioeconomic History    Marital status:    Tobacco Use    Smoking status: Former     Current packs/day: 0.00     Average packs/day: 1 pack/day for 29.0 years (29.0 ttl pk-yrs)     Types: Cigarettes     Start date: 1987     Quit date: 2016     Years since quittin.2     Passive exposure: Past    Smokeless tobacco: Never   Vaping Use    Vaping status: Never Used   Substance and Sexual Activity    Alcohol use: No    Drug use: No    Sexual activity: Not Currently     Partners: Male     Birth control/protection: Hysterectomy, Surgical           Objective   Physical Exam    Is no adenopathy JVD or bruits.  Lungs  are clear.  Heart has a regular rate rhythm without murmur rub or gallop.  Chest is nontender.  Abdomen soft nontender.  Extremities M is no cyanosis or edema.  Procedures       My EKG interpretation was normal sinus rhythm at a rate of 66 with no acute ST change    ED Course      Results for orders placed or performed during the hospital encounter of 04/15/25   ECG 12 Lead Other; near syncope; dizziness    Collection Time: 04/15/25  3:51 PM   Result Value Ref Range    QT Interval 405 ms    QTC Interval 425 ms   Basic Metabolic Panel    Collection Time: 04/15/25  4:00 PM    Specimen: Blood   Result Value Ref Range    Glucose 143 (H) 65 - 99 mg/dL    BUN 12 6 - 20 mg/dL    Creatinine 0.82 0.57 - 1.00 mg/dL    Sodium 137 136 - 145 mmol/L    Potassium 3.9 3.5 - 5.2 mmol/L    Chloride 100 98 - 107 mmol/L    CO2 25.8 22.0 - 29.0 mmol/L    Calcium 10.2 8.6 - 10.5 mg/dL    BUN/Creatinine Ratio 14.6 7.0 - 25.0    Anion Gap 11.2 5.0 - 15.0 mmol/L    eGFR 86.7 >60.0 mL/min/1.73   High Sensitivity Troponin T    Collection Time: 04/15/25  4:00 PM    Specimen: Blood   Result Value Ref Range    HS Troponin T <6 <14 ng/L   CBC Auto Differential    Collection Time: 04/15/25  4:00 PM    Specimen: Blood   Result Value Ref Range    WBC 9.20 3.40 - 10.80 10*3/mm3    RBC 5.12 3.77 - 5.28 10*6/mm3    Hemoglobin 14.1 12.0 - 15.9 g/dL    Hematocrit 45.1 34.0 - 46.6 %    MCV 88.1 79.0 - 97.0 fL    MCH 27.5 26.6 - 33.0 pg    MCHC 31.3 (L) 31.5 - 35.7 g/dL    RDW 14.2 12.3 - 15.4 %    RDW-SD 45.7 37.0 - 54.0 fl    MPV 9.8 6.0 - 12.0 fL    Platelets 305 140 - 450 10*3/mm3    Neutrophil % 47.7 42.7 - 76.0 %    Lymphocyte % 43.0 19.6 - 45.3 %    Monocyte % 6.1 5.0 - 12.0 %    Eosinophil % 2.5 0.3 - 6.2 %    Basophil % 0.5 0.0 - 1.5 %    Immature Grans % 0.2 0.0 - 0.5 %    Neutrophils, Absolute 4.38 1.70 - 7.00 10*3/mm3    Lymphocytes, Absolute 3.96 (H) 0.70 - 3.10 10*3/mm3    Monocytes, Absolute 0.56 0.10 - 0.90 10*3/mm3    Eosinophils, Absolute  0.23 0.00 - 0.40 10*3/mm3    Basophils, Absolute 0.05 0.00 - 0.20 10*3/mm3    Immature Grans, Absolute 0.02 0.00 - 0.05 10*3/mm3    nRBC 0.0 0.0 - 0.2 /100 WBC   Light Blue Top    Collection Time: 04/15/25  4:00 PM   Result Value Ref Range    Extra Tube Hold for add-ons.    High Sensitivity Troponin T 1Hr    Collection Time: 04/15/25  5:08 PM    Specimen: Blood   Result Value Ref Range    HS Troponin T <6 <14 ng/L    Troponin T Numeric Delta       No radiology results for the last day                                                   Medical Decision Making  BMP shows no renal insufficiency or electrolyte abnormality.  Patient has no acute coronary syndrome based on EKG and troponin.  CBC shows no leukocytosis no left shift and no anemia.  Patient will be discharged.  She will be placed on Klonopin for several days.  She will continue with her Antivert.  She will follow with her MD for recheck.    Amount and/or Complexity of Data Reviewed  Labs: ordered. Decision-making details documented in ED Course.  ECG/medicine tests: ordered and independent interpretation performed.    Risk  Prescription drug management.        Final diagnoses:   Vertigo       ED Disposition  ED Disposition       ED Disposition   Discharge    Condition   Stable    Comment   --               No follow-up provider specified.       Medication List        New Prescriptions      clonazePAM 0.5 MG tablet  Commonly known as: KlonoPIN  Take 1 tablet by mouth 2 (Two) Times a Day As Needed (Dizziness).               Where to Get Your Medications        These medications were sent to [facility]/pharmacy #[id number] - [place], [place] - [address] [phone]  - [phone]   [address] [place] [address]      Phone: [phone]   clonazePAM 0.5 MG tablet            Garret Santizo MD  04/15/25 4010

## 2025-04-15 NOTE — ED NOTES
Pt here for dizziness that started last Friday. Pt was [place] [facility] [place]. Pt reports no relief from meclizine prescribed previously. Pt states she had approx 30 minutes of chest tightness today that is now resolved. Pt endorses headache near occipital region. Pt in gown, family at bedside.

## 2025-04-16 LAB
QT INTERVAL: 405 MS
QTC INTERVAL: 425 MS

## 2025-05-12 ENCOUNTER — OFFICE VISIT (OUTPATIENT)
Dept: FAMILY MEDICINE CLINIC | Facility: CLINIC | Age: 50
End: 2025-05-12
Payer: COMMERCIAL

## 2025-05-12 VITALS
DIASTOLIC BLOOD PRESSURE: 67 MMHG | BODY MASS INDEX: 33.59 KG/M2 | RESPIRATION RATE: 18 BRPM | WEIGHT: 209 LBS | HEIGHT: 66 IN | OXYGEN SATURATION: 93 % | TEMPERATURE: 98.4 F | SYSTOLIC BLOOD PRESSURE: 106 MMHG | HEART RATE: 75 BPM

## 2025-05-12 DIAGNOSIS — E78.2 MIXED HYPERLIPIDEMIA: ICD-10-CM

## 2025-05-12 DIAGNOSIS — E11.9 TYPE 2 DIABETES MELLITUS WITHOUT COMPLICATION, WITHOUT LONG-TERM CURRENT USE OF INSULIN: Primary | ICD-10-CM

## 2025-05-12 DIAGNOSIS — Z12.31 ENCOUNTER FOR SCREENING MAMMOGRAM FOR MALIGNANT NEOPLASM OF BREAST: ICD-10-CM

## 2025-05-12 DIAGNOSIS — R42 VERTIGO: ICD-10-CM

## 2025-05-12 PROCEDURE — 99214 OFFICE O/P EST MOD 30 MIN: CPT | Performed by: FAMILY MEDICINE

## 2025-05-12 NOTE — PROGRESS NOTES
Subjective   Maria E Barroso is a 49 y.o. female.   Chief Complaint   Patient presents with    Diabetes    Hyperlipidemia       History of Present Illness   49 y.o. female with past medical history of HLD, type 2 diabetes presents to the office today to follow-up.  Last A1c was 3 months ago and it was excellent at 6.4%.  Last lipid panel was 6 months ago and it was also excellent.  She had an episode of pneumonia back in January.  She went to the emergency room twice April 12 and April 15 for lightheadedness.  Second diagnosis was vertigo.  She was treated with clonazepam 0.5 mg twice daily as needed for the dizziness.  Looks like she has an appointment with neurology in September for dizziness.  This consult was placed it looks like through the freestanding ER in April.      Last mammogram in May 2024 led to diagnostic mammogram, ultrasound, cyst aspiration, placement of coil marker.  Pathology was negative.  Date of screening mammogram was April 20, 2024    Major complaint today is still ongoing dizziness.  Weight today is 209 pounds.  When I saw her back in February she was 205 pounds.  History of Present Illness  The patient presents for evaluation of dizziness.    She experienced an episode of dizziness last month, characterized by a sensation of the room spinning when she attempted to lie on either side or flat. This was accompanied by a sudden onset of heat, nausea, and a near-fainting episode, although she did not lose consciousness. She also noted pallor upon self-examination in the bathroom mirror. The severity of her dizziness necessitated sleeping in a seated position on the couch. Her symptoms were alleviated when she remained still and avoided looking down.     The following day, she sought medical attention at an urgent care facility where she underwent a chest x-ray, CT scan, blood work, and EKG, all of which returned normal results. A subsequent CT scan with contrast was suggested but could not be  performed due to her allergy to contrast. She was prescribed meclizine, which allowed her to return to work on Monday and Tuesday. However, she reported feeling unwell on Tuesday, with persistent dizziness and a sensation of pressure in her head. She was not on any other medications at that time. She went to the ER and was advised to follow up with her primary care physician. She was prescribed Klonopin but declined to take it due to concerns about potential side effects.     She suspects her symptoms may be due to vertigo or an inner ear infection. She attempted a vertigo protocol at work, which provided some relief. Her blood pressure was recorded as 91/58 during her ER visit, and she has been monitoring it since. She took meclizine for 3 days and has not taken any since. She also reports a sensation of pressure in the back of her head and expresses concern about the possibility of a stroke or brain aneurysm.    Her blood sugar levels have been well-controlled, with a recent A1c reading of 6.4. She continues to consume zero-sugar drinks.    She had a mammogram last year, which led to a second mammogram and an ultrasound. She had 2 aspirations and a little coil marker placed.      Patient Active Problem List    Diagnosis Date Noted    Mixed hyperlipidemia 09/29/2020    Class 1 obesity due to excess calories with serious comorbidity and body mass index (BMI) of 33.0 to 33.9 in adult 10/14/2019    Onychomycosis of toenail 04/01/2016    Abnormal mammogram 10/14/2015    Leukocytosis 08/06/2014    Nonspecific abnormal results of function study of liver 07/03/2014    Type 2 diabetes mellitus 05/07/2014           Past Surgical History:   Procedure Laterality Date    HYSTERECTOMY  11/2004    total     TIBIA FRACTURE SURGERY Left     plate with screws    US GUIDED CYST ASPIRATION BREAST N/A 05/23/2024     Current Outpatient Medications on File Prior to Visit   Medication Sig    albuterol sulfate  (90 Base) MCG/ACT  inhaler Inhale 2 puffs Every 4 (Four) Hours As Needed for Wheezing or Shortness of Air.    atorvastatin (LIPITOR) 40 MG tablet TAKE 1 TABLET BY MOUTH EVERYDAY AT BEDTIME    Blood Glucose Monitoring Suppl (ACCU-CHEK [place] PLUS) w/Device kit 1 kit by Other route Daily.    Dulaglutide (Trulicity) 4.5 MG/0.5ML solution auto-injector Inject 4.5 mg under the skin into the appropriate area as directed Every 7 (Seven) Days.    glucose blood (ACCU-CHEK [place] PLUS) test strip 1 each by Other route Daily.    ibuprofen (ADVIL,MOTRIN) 200 MG tablet Take 2 tablets by mouth Every 6 (Six) Hours As Needed for Mild Pain.    Lancets (ACCU-CHEK SOFT TOUCH) lancets 1 each by Other route Daily.    meclizine (ANTIVERT) 25 MG tablet Take 1 tablet by mouth 3 (Three) Times a Day As Needed for Dizziness or Nausea.    metFORMIN (GLUCOPHAGE) 500 MG tablet TAKE 1 TABLET BY MOUTH EVERY DAY WITH BREAKFAST    ondansetron ODT (ZOFRAN-ODT) 4 MG disintegrating tablet Place 1 tablet on the tongue Every 8 (Eight) Hours As Needed for Nausea or Vomiting.    [DISCONTINUED] clonazePAM (KlonoPIN) 0.5 MG tablet Take 1 tablet by mouth 2 (Two) Times a Day As Needed (Dizziness). (Patient not taking: Reported on 2025)     No current facility-administered medications on file prior to visit.     Allergies   Allergen Reactions    Contrast Dye (Echo Or Unknown Ct/Mr) Hives     Social History     Socioeconomic History    Marital status:    Tobacco Use    Smoking status: Former     Current packs/day: 0.00     Average packs/day: 1 pack/day for 29.0 years (29.0 ttl pk-yrs)     Types: Cigarettes     Start date: 1987     Quit date: 2016     Years since quittin.3     Passive exposure: Past    Smokeless tobacco: Never   Vaping Use    Vaping status: Never Used   Substance and Sexual Activity    Alcohol use: No    Drug use: No    Sexual activity: Not Currently     Partners: Male     Birth control/protection: Hysterectomy, Surgical     Family History  "  Problem Relation Age of Onset    Diabetes Mother     Hypertension Mother     Arthritis Mother     Bipolar disorder Mother     Heart disease Mother     Mental illness Mother     Stroke Father         age 55-56    Hypertension Father     Glaucoma Father     Hyperlipidemia Father     Bipolar disorder Sister     Diabetes Sister     Glaucoma Sister     Mental illness Sister        Review of Systems    Objective   /67 (BP Location: Right arm, Patient Position: Sitting, Cuff Size: Large Adult)   Pulse 75   Temp 98.4 °F (36.9 °C) (Infrared)   Resp 18   Ht 167.6 cm (66\")   Wt 94.8 kg (209 lb)   LMP  (LMP Unknown)   SpO2 93%   Breastfeeding No   BMI 33.73 kg/m²   Physical Exam  Constitutional:       Appearance: She is well-developed.      Comments:      HENT:      Head: Normocephalic and atraumatic.   Eyes:      Conjunctiva/sclera: Conjunctivae normal.   Cardiovascular:      Rate and Rhythm: Normal rate.   Pulmonary:      Effort: Pulmonary effort is normal.   Musculoskeletal:         General: Normal range of motion.      Cervical back: Normal range of motion.   Skin:     General: Skin is warm and dry.      Findings: No rash.   Neurological:      Mental Status: She is alert and oriented to person, place, and time.   Psychiatric:         Behavior: Behavior normal.       Physical Exam        Admission on 04/15/2025, Discharged on 04/15/2025   Component Date Value Ref Range Status    QT Interval 04/15/2025 405  ms Final    QTC Interval 04/15/2025 425  ms Final    Glucose 04/15/2025 143 (H)  65 - 99 mg/dL Final    BUN 04/15/2025 12  6 - 20 mg/dL Final    Creatinine 04/15/2025 0.82  0.57 - 1.00 mg/dL Final    Sodium 04/15/2025 137  136 - 145 mmol/L Final    Potassium 04/15/2025 3.9  3.5 - 5.2 mmol/L Final    Chloride 04/15/2025 100  98 - 107 mmol/L Final    CO2 04/15/2025 25.8  22.0 - 29.0 mmol/L Final    Calcium 04/15/2025 10.2  8.6 - 10.5 mg/dL Final    BUN/Creatinine Ratio 04/15/2025 14.6  7.0 - 25.0 Final    " Anion Gap 04/15/2025 11.2  5.0 - 15.0 mmol/L Final    eGFR 04/15/2025 86.7  >60.0 mL/min/1.73 Final    HS Troponin T 04/15/2025 <6  <14 ng/L Final    WBC 04/15/2025 9.20  3.40 - 10.80 10*3/mm3 Final    RBC 04/15/2025 5.12  3.77 - 5.28 10*6/mm3 Final    Hemoglobin 04/15/2025 14.1  12.0 - 15.9 g/dL Final    Hematocrit 04/15/2025 45.1  34.0 - 46.6 % Final    MCV 04/15/2025 88.1  79.0 - 97.0 fL Final    MCH 04/15/2025 27.5  26.6 - 33.0 pg Final    MCHC 04/15/2025 31.3 (L)  31.5 - 35.7 g/dL Final    RDW 04/15/2025 14.2  12.3 - 15.4 % Final    RDW-SD 04/15/2025 45.7  37.0 - 54.0 fl Final    MPV 04/15/2025 9.8  6.0 - 12.0 fL Final    Platelets 04/15/2025 305  140 - 450 10*3/mm3 Final    Neutrophil % 04/15/2025 47.7  42.7 - 76.0 % Final    Lymphocyte % 04/15/2025 43.0  19.6 - 45.3 % Final    Monocyte % 04/15/2025 6.1  5.0 - 12.0 % Final    Eosinophil % 04/15/2025 2.5  0.3 - 6.2 % Final    Basophil % 04/15/2025 0.5  0.0 - 1.5 % Final    Immature Grans % 04/15/2025 0.2  0.0 - 0.5 % Final    Neutrophils, Absolute 04/15/2025 4.38  1.70 - 7.00 10*3/mm3 Final    Lymphocytes, Absolute 04/15/2025 3.96 (H)  0.70 - 3.10 10*3/mm3 Final    Monocytes, Absolute 04/15/2025 0.56  0.10 - 0.90 10*3/mm3 Final    Eosinophils, Absolute 04/15/2025 0.23  0.00 - 0.40 10*3/mm3 Final    Basophils, Absolute 04/15/2025 0.05  0.00 - 0.20 10*3/mm3 Final    Immature Grans, Absolute 04/15/2025 0.02  0.00 - 0.05 10*3/mm3 Final    nRBC 04/15/2025 0.0  0.0 - 0.2 /100 WBC Final    Extra Tube 04/15/2025 Hold for add-ons.   Final    Auto resulted    HS Troponin T 04/15/2025 <6  <14 ng/L Final    Troponin T Numeric Delta 04/15/2025    Final    Unable to calculate.   Admission on 04/12/2025, Discharged on 04/12/2025   Component Date Value Ref Range Status    QT Interval 04/12/2025 401  ms Final    QTC Interval 04/12/2025 419  ms Final    Glucose 04/12/2025 113  70 - 130 mg/dL Final    Serial Number: [id number]:  [id number]    Glucose 04/12/2025 129 (H)  65  - 99 mg/dL Final    BUN 04/12/2025 12  6 - 20 mg/dL Final    Creatinine 04/12/2025 0.74  0.57 - 1.00 mg/dL Final    Sodium 04/12/2025 135 (L)  136 - 145 mmol/L Final    Potassium 04/12/2025 4.0  3.5 - 5.2 mmol/L Final    Chloride 04/12/2025 102  98 - 107 mmol/L Final    CO2 04/12/2025 24.0  22.0 - 29.0 mmol/L Final    Calcium 04/12/2025 9.9  8.6 - 10.5 mg/dL Final    Total Protein 04/12/2025 7.4  6.0 - 8.5 g/dL Final    Albumin 04/12/2025 4.4  3.5 - 5.2 g/dL Final    ALT (SGPT) 04/12/2025 23  1 - 33 U/L Final    AST (SGOT) 04/12/2025 16  1 - 32 U/L Final    Alkaline Phosphatase 04/12/2025 98  39 - 117 U/L Final    Total Bilirubin 04/12/2025 0.3  0.0 - 1.2 mg/dL Final    Globulin 04/12/2025 3.0  gm/dL Final    A/G Ratio 04/12/2025 1.5  g/dL Final    BUN/Creatinine Ratio 04/12/2025 16.2  7.0 - 25.0 Final    Anion Gap 04/12/2025 9.0  5.0 - 15.0 mmol/L Final    eGFR 04/12/2025 98.1  >60.0 mL/min/1.73 Final    HS Troponin T 04/12/2025 <6  <14 ng/L Final    Magnesium 04/12/2025 2.0  1.6 - 2.6 mg/dL Final    Color, UA 04/12/2025 Yellow  Yellow, Straw Final    Appearance, UA 04/12/2025 Slightly Cloudy (A)  Clear Final    pH, UA 04/12/2025 5.5  5.0 - 8.0 Final    Specific Gravity, UA 04/12/2025 >=1.030  1.005 - 1.030 Final    Glucose, UA 04/12/2025 Negative  Negative Final    Ketones, UA 04/12/2025 Negative  Negative Final    Bilirubin, UA 04/12/2025 Negative  Negative Final    Blood, UA 04/12/2025 Negative  Negative Final    Protein, UA 04/12/2025 Negative  Negative Final    Leuk Esterase, UA 04/12/2025 Negative  Negative Final    Nitrite, UA 04/12/2025 Negative  Negative Final    Urobilinogen, UA 04/12/2025 0.2 E.U./dL  0.2 - 1.0 E.U./dL Final    WBC 04/12/2025 11.12 (H)  3.40 - 10.80 10*3/mm3 Final    RBC 04/12/2025 5.15  3.77 - 5.28 10*6/mm3 Final    Hemoglobin 04/12/2025 14.1  12.0 - 15.9 g/dL Final    Hematocrit 04/12/2025 46.1  34.0 - 46.6 % Final    MCV 04/12/2025 89.5  79.0 - 97.0 fL Final    MCH 04/12/2025 27.4   26.6 - 33.0 pg Final    MCHC 04/12/2025 30.6 (L)  31.5 - 35.7 g/dL Final    RDW 04/12/2025 14.4  12.3 - 15.4 % Final    RDW-SD 04/12/2025 48.3  37.0 - 54.0 fl Final    MPV 04/12/2025 9.6  6.0 - 12.0 fL Final    Platelets 04/12/2025 329  140 - 450 10*3/mm3 Final    Neutrophil % 04/12/2025 52.7  42.7 - 76.0 % Final    Lymphocyte % 04/12/2025 39.7  19.6 - 45.3 % Final    Monocyte % 04/12/2025 5.2  5.0 - 12.0 % Final    Eosinophil % 04/12/2025 1.8  0.3 - 6.2 % Final    Basophil % 04/12/2025 0.3  0.0 - 1.5 % Final    Immature Grans % 04/12/2025 0.3  0.0 - 0.5 % Final    Neutrophils, Absolute 04/12/2025 5.86  1.70 - 7.00 10*3/mm3 Final    Lymphocytes, Absolute 04/12/2025 4.42 (H)  0.70 - 3.10 10*3/mm3 Final    Monocytes, Absolute 04/12/2025 0.58  0.10 - 0.90 10*3/mm3 Final    Eosinophils, Absolute 04/12/2025 0.20  0.00 - 0.40 10*3/mm3 Final    Basophils, Absolute 04/12/2025 0.03  0.00 - 0.20 10*3/mm3 Final    Immature Grans, Absolute 04/12/2025 0.03  0.00 - 0.05 10*3/mm3 Final    HS Troponin T 04/12/2025 <6  <14 ng/L Final    Troponin T Numeric Delta 04/12/2025    Final    Unable to calculate.   Office Visit on 01/14/2025   Component Date Value Ref Range Status    Hemoglobin A1C 01/14/2025 6.4 (H)  4.8 - 5.6 % Final    Comment:          Prediabetes: 5.7 - 6.4           Diabetes: >6.4           Glycemic control for adults with diabetes: <7.0      Glucose 01/14/2025 108 (H)  70 - 99 mg/dL Final    BUN 01/14/2025 11  6 - 24 mg/dL Final    Creatinine 01/14/2025 0.70  0.57 - 1.00 mg/dL Final    EGFR Result 01/14/2025 105  >59 mL/min/1.73 Final    BUN/Creatinine Ratio 01/14/2025 16  9 - 23 Final    Sodium 01/14/2025 139  134 - 144 mmol/L Final    Potassium 01/14/2025 4.6  3.5 - 5.2 mmol/L Final    Chloride 01/14/2025 101  96 - 106 mmol/L Final    Total CO2 01/14/2025 25  20 - 29 mmol/L Final    Calcium 01/14/2025 9.5  8.7 - 10.2 mg/dL Final    Total Protein 01/14/2025 7.2  6.0 - 8.5 g/dL Final    Albumin 01/14/2025 4.4  3.8 -  4.9 g/dL Final    Globulin 01/14/2025 2.8  1.5 - 4.5 g/dL Final    Total Bilirubin 01/14/2025 0.5  0.0 - 1.2 mg/dL Final    Alkaline Phosphatase 01/14/2025 91  44 - 121 IU/L Final    AST (SGOT) 01/14/2025 22  0 - 40 IU/L Final    ALT (SGPT) 01/14/2025 54 (H)  0 - 32 IU/L Final    Magnesium 01/14/2025 2.0  1.6 - 2.3 mg/dL Final     Results  Labs   - A1c: 6.4    Imaging   - Chest x-ray: Normal   - CAT scan: Normal    Diagnostic Testing   - EKG: Normal    BMI is >= 30 and <35. (Class 1 Obesity). The following options were offered after discussion;: exercise counseling/recommendations and nutrition counseling/recommendations     Assessment & Plan   Diagnoses and all orders for this visit:    1. Type 2 diabetes mellitus without complication, without long-term current use of insulin (Primary)    2. Mixed hyperlipidemia    3. Vertigo    4. Encounter for screening mammogram for malignant neoplasm of breast  -     Mammo Screening Digital Tomosynthesis Bilateral With CAD; Future      Assessment & Plan  1. Vertigo.  - Symptoms include dizziness, nausea, and a sensation of the room spinning, which improved with meclizine.  - Advised to avoid taking meclizine if possible and to use vestibular rehabilitation maneuvers for symptom management.  - If symptoms persist, she should perform the maneuvers again.  - Discussed the likely viral etiology affecting the inner ear and reassured that it is not indicative of a chronic problem.    2. Diabetes Mellitus.  - A1c level from 01/14/2025 was 6.4, indicating good control of diabetes.   - No additional blood work is required at this time.  Patient is to had lab work done 2 weeks ago.  Her A1c's have all been at goal.  Will defer A1c testing today.  Anticipate picking up again at the next visit with an A1c, urine microalbumin/creatinine ratio, CMP.  - Patient continues to manage her condition with diet and monitoring.    3. Health Maintenance.  - Routine screening mammogram will be  ordered.  - Patient prefers to schedule it on a Saturday morning.  - Reviewed the importance of regular mammograms despite previous benign findings.  Overall, I spent 30 minutes on the day of service reviewing everything above with Maria E.      Call with any problems or concerns before next visit       Return in about 3 months (around 8/12/2025).  Patient or patient representative verbalized consent for the use of Ambient Listening during the visit with  Jo Vasquez MD for chart documentation. 5/12/2025  15:38 EDT    Part of this note may be an electronic transcription/translation of spoken language to printed text using the Dragon Dictation System    Jo Vasquez MD5/12/202515:38 EDT  This note has been electronically signed

## 2025-05-28 ENCOUNTER — HOSPITAL ENCOUNTER (OUTPATIENT)
Dept: MAMMOGRAPHY | Facility: HOSPITAL | Age: 50
Discharge: HOME | End: 2025-05-28
Admitting: FAMILY MEDICINE
Payer: COMMERCIAL

## 2025-05-28 DIAGNOSIS — Z12.31 ENCOUNTER FOR SCREENING MAMMOGRAM FOR MALIGNANT NEOPLASM OF BREAST: ICD-10-CM

## 2025-05-28 LAB
NCCN CRITERIA FLAG: NORMAL
TYRER CUZICK SCORE: 7.5

## 2025-05-28 PROCEDURE — 77067 SCR MAMMO BI INCL CAD: CPT

## 2025-05-28 PROCEDURE — 77063 BREAST TOMOSYNTHESIS BI: CPT

## 2025-08-13 ENCOUNTER — OFFICE VISIT (OUTPATIENT)
Dept: FAMILY MEDICINE CLINIC | Facility: CLINIC | Age: 50
End: 2025-08-13
Payer: COMMERCIAL

## 2025-08-13 VITALS
HEART RATE: 77 BPM | TEMPERATURE: 97.8 F | RESPIRATION RATE: 18 BRPM | HEIGHT: 66 IN | BODY MASS INDEX: 34.17 KG/M2 | WEIGHT: 212.6 LBS | DIASTOLIC BLOOD PRESSURE: 70 MMHG | SYSTOLIC BLOOD PRESSURE: 110 MMHG | OXYGEN SATURATION: 93 %

## 2025-08-13 DIAGNOSIS — G44.86 CERVICOGENIC HEADACHE: ICD-10-CM

## 2025-08-13 DIAGNOSIS — E78.2 MIXED HYPERLIPIDEMIA: ICD-10-CM

## 2025-08-13 DIAGNOSIS — E11.9 TYPE 2 DIABETES MELLITUS WITHOUT COMPLICATION, WITHOUT LONG-TERM CURRENT USE OF INSULIN: Primary | ICD-10-CM

## 2025-08-13 RX ORDER — CYCLOBENZAPRINE HCL 10 MG
10 TABLET ORAL NIGHTLY
Qty: 10 TABLET | Refills: 0 | Status: SHIPPED | OUTPATIENT
Start: 2025-08-13 | End: 2025-08-23

## 2025-08-14 LAB
ALBUMIN/CREAT UR: 8 MG/G CREAT (ref 0–29)
CHOLEST SERPL-MCNC: 120 MG/DL (ref 100–199)
CREAT UR-MCNC: 169 MG/DL
HBA1C MFR BLD: 7.5 % (ref 4.8–5.6)
HDLC SERPL-MCNC: 33 MG/DL
LDLC SERPL CALC-MCNC: 59 MG/DL (ref 0–99)
MAGNESIUM SERPL-MCNC: 2 MG/DL (ref 1.6–2.3)
MICROALBUMIN UR-MCNC: 13.4 UG/ML
TRIGL SERPL-MCNC: 166 MG/DL (ref 0–149)
VLDLC SERPL CALC-MCNC: 28 MG/DL (ref 5–40)